# Patient Record
Sex: FEMALE | Race: BLACK OR AFRICAN AMERICAN | NOT HISPANIC OR LATINO | Employment: OTHER | URBAN - METROPOLITAN AREA
[De-identification: names, ages, dates, MRNs, and addresses within clinical notes are randomized per-mention and may not be internally consistent; named-entity substitution may affect disease eponyms.]

---

## 2017-10-09 ENCOUNTER — HOSPITAL ENCOUNTER (OUTPATIENT)
Dept: RADIOLOGY | Facility: HOSPITAL | Age: 72
Discharge: HOME | End: 2017-10-09
Attending: OTOLARYNGOLOGY
Payer: MEDICARE

## 2017-10-09 ENCOUNTER — HOSPITAL ENCOUNTER (OUTPATIENT)
Dept: PREADMISSION TESTING | Facility: HOSPITAL | Age: 72
Discharge: HOME | End: 2017-10-09
Attending: OTOLARYNGOLOGY
Payer: MEDICARE

## 2017-10-09 VITALS
HEART RATE: 52 BPM | WEIGHT: 151.13 LBS | HEIGHT: 65 IN | DIASTOLIC BLOOD PRESSURE: 82 MMHG | RESPIRATION RATE: 18 BRPM | BODY MASS INDEX: 25.18 KG/M2 | TEMPERATURE: 98 F | OXYGEN SATURATION: 98 % | SYSTOLIC BLOOD PRESSURE: 161 MMHG

## 2017-10-09 DIAGNOSIS — Z01.818 PREOPERATIVE TESTING: Primary | ICD-10-CM

## 2017-10-09 PROCEDURE — 71020 XR CHEST PA AND LATERAL PRE-OP: CPT | Mod: 26,,, | Performed by: RADIOLOGY

## 2017-10-09 PROCEDURE — 71020 XR CHEST PA AND LATERAL PRE-OP: CPT | Mod: TC

## 2017-10-09 RX ORDER — BISOPROLOL FUMARATE AND HYDROCHLOROTHIAZIDE 5; 6.25 MG/1; MG/1
1 TABLET ORAL DAILY
COMMUNITY
End: 2019-07-26

## 2017-10-09 NOTE — PLAN OF CARE
Pre-operative instructions, medication directives and pain scales reviewed with Ms Silver.  All questions the patient had  were answered. Re-assurance about surgical procedure and day of surgery routine given as needed. Ms Silver verbalized understanding of the pre-op instructions.

## 2017-10-10 ENCOUNTER — ANESTHESIA EVENT (OUTPATIENT)
Dept: SURGERY | Facility: HOSPITAL | Age: 72
End: 2017-10-10
Payer: MEDICARE

## 2017-10-11 ENCOUNTER — ANESTHESIA (OUTPATIENT)
Dept: SURGERY | Facility: HOSPITAL | Age: 72
End: 2017-10-11
Payer: MEDICARE

## 2017-10-11 ENCOUNTER — HOSPITAL ENCOUNTER (OUTPATIENT)
Facility: HOSPITAL | Age: 72
Discharge: HOME | End: 2017-10-11
Attending: OTOLARYNGOLOGY | Admitting: OTOLARYNGOLOGY
Payer: MEDICARE

## 2017-10-11 VITALS
HEIGHT: 65 IN | RESPIRATION RATE: 18 BRPM | HEART RATE: 47 BPM | WEIGHT: 151.13 LBS | DIASTOLIC BLOOD PRESSURE: 76 MMHG | BODY MASS INDEX: 25.18 KG/M2 | SYSTOLIC BLOOD PRESSURE: 151 MMHG | TEMPERATURE: 98 F | OXYGEN SATURATION: 99 %

## 2017-10-11 DIAGNOSIS — R49.0 HOARSENESS: Primary | ICD-10-CM

## 2017-10-11 PROCEDURE — 63600175 PHARM REV CODE 636 W HCPCS: Performed by: NURSE ANESTHETIST, CERTIFIED REGISTERED

## 2017-10-11 PROCEDURE — 25000003 PHARM REV CODE 250: Performed by: OTOLARYNGOLOGY

## 2017-10-11 PROCEDURE — 25000003 PHARM REV CODE 250: Performed by: NURSE ANESTHETIST, CERTIFIED REGISTERED

## 2017-10-11 PROCEDURE — 71000033 HC RECOVERY, INTIAL HOUR: Performed by: OTOLARYNGOLOGY

## 2017-10-11 PROCEDURE — 71000016 HC POSTOP RECOV ADDL HR: Performed by: OTOLARYNGOLOGY

## 2017-10-11 PROCEDURE — D9220A PRA ANESTHESIA: Mod: ANES,,, | Performed by: ANESTHESIOLOGY

## 2017-10-11 PROCEDURE — 36000708 HC OR TIME LEV III 1ST 15 MIN: Performed by: OTOLARYNGOLOGY

## 2017-10-11 PROCEDURE — 88342 IMHCHEM/IMCYTCHM 1ST ANTB: CPT | Mod: 26,,, | Performed by: PATHOLOGY

## 2017-10-11 PROCEDURE — D9220A PRA ANESTHESIA: Mod: CRNA,,, | Performed by: NURSE ANESTHETIST, CERTIFIED REGISTERED

## 2017-10-11 PROCEDURE — 25000003 PHARM REV CODE 250: Performed by: ANESTHESIOLOGY

## 2017-10-11 PROCEDURE — 88305 TISSUE EXAM BY PATHOLOGIST: CPT | Mod: 26,,, | Performed by: PATHOLOGY

## 2017-10-11 PROCEDURE — 37000009 HC ANESTHESIA EA ADD 15 MINS: Performed by: OTOLARYNGOLOGY

## 2017-10-11 PROCEDURE — 63600175 PHARM REV CODE 636 W HCPCS: Performed by: ANESTHESIOLOGY

## 2017-10-11 PROCEDURE — 71000015 HC POSTOP RECOV 1ST HR: Performed by: OTOLARYNGOLOGY

## 2017-10-11 PROCEDURE — 36000709 HC OR TIME LEV III EA ADD 15 MIN: Performed by: OTOLARYNGOLOGY

## 2017-10-11 PROCEDURE — 37000008 HC ANESTHESIA 1ST 15 MINUTES: Performed by: OTOLARYNGOLOGY

## 2017-10-11 PROCEDURE — 88305 TISSUE EXAM BY PATHOLOGIST: CPT | Performed by: PATHOLOGY

## 2017-10-11 RX ORDER — SODIUM CHLORIDE, SODIUM LACTATE, POTASSIUM CHLORIDE, CALCIUM CHLORIDE 600; 310; 30; 20 MG/100ML; MG/100ML; MG/100ML; MG/100ML
INJECTION, SOLUTION INTRAVENOUS CONTINUOUS
Status: DISCONTINUED | OUTPATIENT
Start: 2017-10-11 | End: 2017-10-11 | Stop reason: HOSPADM

## 2017-10-11 RX ORDER — HYDROCODONE BITARTRATE AND ACETAMINOPHEN 5; 325 MG/1; MG/1
1 TABLET ORAL EVERY 4 HOURS PRN
Status: DISCONTINUED | OUTPATIENT
Start: 2017-10-11 | End: 2017-10-11 | Stop reason: HOSPADM

## 2017-10-11 RX ORDER — HYDROMORPHONE HYDROCHLORIDE 2 MG/ML
0.2 INJECTION, SOLUTION INTRAMUSCULAR; INTRAVENOUS; SUBCUTANEOUS EVERY 5 MIN PRN
Status: DISCONTINUED | OUTPATIENT
Start: 2017-10-11 | End: 2017-10-11 | Stop reason: HOSPADM

## 2017-10-11 RX ORDER — ACETAMINOPHEN 10 MG/ML
1000 INJECTION, SOLUTION INTRAVENOUS ONCE
Status: DISCONTINUED | OUTPATIENT
Start: 2017-10-11 | End: 2017-10-11 | Stop reason: HOSPADM

## 2017-10-11 RX ORDER — LIDOCAINE HYDROCHLORIDE 10 MG/ML
1 INJECTION, SOLUTION EPIDURAL; INFILTRATION; INTRACAUDAL; PERINEURAL ONCE
Status: DISCONTINUED | OUTPATIENT
Start: 2017-10-11 | End: 2017-10-11 | Stop reason: HOSPADM

## 2017-10-11 RX ORDER — MEPERIDINE HYDROCHLORIDE 50 MG/ML
12.5 INJECTION INTRAMUSCULAR; INTRAVENOUS; SUBCUTANEOUS ONCE AS NEEDED
Status: DISCONTINUED | OUTPATIENT
Start: 2017-10-11 | End: 2017-10-11 | Stop reason: HOSPADM

## 2017-10-11 RX ORDER — PROPOFOL 10 MG/ML
VIAL (ML) INTRAVENOUS
Status: DISCONTINUED | OUTPATIENT
Start: 2017-10-11 | End: 2017-10-11

## 2017-10-11 RX ORDER — GLYCOPYRROLATE 0.2 MG/ML
INJECTION INTRAMUSCULAR; INTRAVENOUS
Status: DISCONTINUED | OUTPATIENT
Start: 2017-10-11 | End: 2017-10-11

## 2017-10-11 RX ORDER — LIDOCAINE HCL/PF 100 MG/5ML
SYRINGE (ML) INTRAVENOUS
Status: DISCONTINUED | OUTPATIENT
Start: 2017-10-11 | End: 2017-10-11

## 2017-10-11 RX ORDER — ROCURONIUM BROMIDE 10 MG/ML
INJECTION, SOLUTION INTRAVENOUS
Status: DISCONTINUED | OUTPATIENT
Start: 2017-10-11 | End: 2017-10-11

## 2017-10-11 RX ORDER — ONDANSETRON 2 MG/ML
INJECTION INTRAMUSCULAR; INTRAVENOUS
Status: DISCONTINUED | OUTPATIENT
Start: 2017-10-11 | End: 2017-10-11

## 2017-10-11 RX ORDER — ACETAMINOPHEN 10 MG/ML
INJECTION, SOLUTION INTRAVENOUS
Status: DISCONTINUED | OUTPATIENT
Start: 2017-10-11 | End: 2017-10-11

## 2017-10-11 RX ORDER — ONDANSETRON 8 MG/1
8 TABLET, ORALLY DISINTEGRATING ORAL ONCE
Status: DISCONTINUED | OUTPATIENT
Start: 2017-10-11 | End: 2017-10-11 | Stop reason: HOSPADM

## 2017-10-11 RX ORDER — DIPHENHYDRAMINE HYDROCHLORIDE 50 MG/ML
25 INJECTION INTRAMUSCULAR; INTRAVENOUS EVERY 6 HOURS PRN
Status: DISCONTINUED | OUTPATIENT
Start: 2017-10-11 | End: 2017-10-11 | Stop reason: HOSPADM

## 2017-10-11 RX ORDER — SODIUM CHLORIDE 0.9 % (FLUSH) 0.9 %
3 SYRINGE (ML) INJECTION
Status: DISCONTINUED | OUTPATIENT
Start: 2017-10-11 | End: 2017-10-11 | Stop reason: HOSPADM

## 2017-10-11 RX ORDER — EPINEPHRINE NASAL SOLUTION 1 MG/ML
SOLUTION NASAL
Status: DISCONTINUED | OUTPATIENT
Start: 2017-10-11 | End: 2017-10-11 | Stop reason: HOSPADM

## 2017-10-11 RX ORDER — NEOSTIGMINE METHYLSULFATE 1 MG/ML
INJECTION, SOLUTION INTRAVENOUS
Status: DISCONTINUED | OUTPATIENT
Start: 2017-10-11 | End: 2017-10-11

## 2017-10-11 RX ORDER — FENTANYL CITRATE 50 UG/ML
INJECTION, SOLUTION INTRAMUSCULAR; INTRAVENOUS
Status: DISCONTINUED | OUTPATIENT
Start: 2017-10-11 | End: 2017-10-11

## 2017-10-11 RX ORDER — MIDAZOLAM HYDROCHLORIDE 1 MG/ML
INJECTION, SOLUTION INTRAMUSCULAR; INTRAVENOUS
Status: DISCONTINUED | OUTPATIENT
Start: 2017-10-11 | End: 2017-10-11

## 2017-10-11 RX ORDER — METOCLOPRAMIDE HYDROCHLORIDE 5 MG/ML
10 INJECTION INTRAMUSCULAR; INTRAVENOUS EVERY 10 MIN PRN
Status: DISCONTINUED | OUTPATIENT
Start: 2017-10-11 | End: 2017-10-11 | Stop reason: HOSPADM

## 2017-10-11 RX ADMIN — ROCURONIUM BROMIDE 25 MG: 10 INJECTION, SOLUTION INTRAVENOUS at 07:10

## 2017-10-11 RX ADMIN — LIDOCAINE HYDROCHLORIDE 100 MG: 20 INJECTION, SOLUTION INTRAVENOUS at 07:10

## 2017-10-11 RX ADMIN — MIDAZOLAM HYDROCHLORIDE 2 MG: 1 INJECTION, SOLUTION INTRAMUSCULAR; INTRAVENOUS at 07:10

## 2017-10-11 RX ADMIN — NEOSTIGMINE METHYLSULFATE 3 MG: 1 INJECTION INTRAVENOUS at 08:10

## 2017-10-11 RX ADMIN — PROPOFOL 150 MG: 10 INJECTION, EMULSION INTRAVENOUS at 07:10

## 2017-10-11 RX ADMIN — ACETAMINOPHEN 1000 MG: 10 INJECTION, SOLUTION INTRAVENOUS at 07:10

## 2017-10-11 RX ADMIN — GLYCOPYRROLATE 0.4 MG: 0.2 INJECTION, SOLUTION INTRAMUSCULAR; INTRAVENOUS at 08:10

## 2017-10-11 RX ADMIN — HYDROMORPHONE HYDROCHLORIDE 0.2 MG: 2 INJECTION, SOLUTION INTRAMUSCULAR; INTRAVENOUS; SUBCUTANEOUS at 08:10

## 2017-10-11 RX ADMIN — ONDANSETRON 4 MG: 2 INJECTION, SOLUTION INTRAMUSCULAR; INTRAVENOUS at 08:10

## 2017-10-11 RX ADMIN — FENTANYL CITRATE 100 MCG: 50 INJECTION INTRAMUSCULAR; INTRAVENOUS at 07:10

## 2017-10-11 RX ADMIN — SODIUM CHLORIDE, SODIUM LACTATE, POTASSIUM CHLORIDE, AND CALCIUM CHLORIDE: .6; .31; .03; .02 INJECTION, SOLUTION INTRAVENOUS at 06:10

## 2017-10-11 NOTE — ANESTHESIA PREPROCEDURE EVALUATION
10/11/2017  Rachel Silver is a 71 y.o., female.    Anesthesia Evaluation         Review of Systems  Cardiovascular:   Hypertension       Physical Exam  General:  Well nourished    Airway/Jaw/Neck:  Airway Findings: Mouth Opening: Normal Tongue: Normal  Mallampati: II      Dental:  Dental Findings: In tact   Chest/Lungs:  Chest/Lungs Clear    Heart/Vascular:  Heart Findings: Normal Heart murmur: negative       Mental Status:  Mental Status Findings:  Cooperative, Alert and Oriented         Anesthesia Plan  Type of Anesthesia, risks & benefits discussed:  Anesthesia Type:  general, MAC, regional  Patient's Preference:   Intra-op Monitoring Plan: standard ASA monitors  Intra-op Monitoring Plan Comments:   Post Op Pain Control Plan: multimodal analgesia  Post Op Pain Control Plan Comments:   Induction:   IV  Beta Blocker:  Patient is not currently on a Beta-Blocker (No further documentation required).       Informed Consent: Patient understands risks and agrees with Anesthesia plan.  Questions answered. Anesthesia consent signed with patient.  ASA Score: 2     Day of Surgery Review of History & Physical:            Ready For Surgery From Anesthesia Perspective.

## 2017-10-11 NOTE — BRIEF OP NOTE
Operative Note     SUMMARY     Surgery Date: 10/11/2017     Surgeon(s) and Role:     * Gumaro Gaines MD - Primary    Pre-op Diagnosis:  Hoarseness [R49.0]  Vocal cord nodules [J38.2]    Post-op Diagnosis:  Hoarseness [R49.0]  Vocal cord nodules [J38.2]    Procedure(s):  MICROLARYNGOSCOPY W/ BIOPSY-LEFT TRUE VOCAL CORD    Anesthesia: General    Findings/Key Components:  Hemorrhagic nodule left anterior true vocal cord    Estimated Blood Loss: 1 cc         Specimens     Start     Ordered    10/11/17 0806  Specimen to Pathology - Surgery  Once      10/11/17 0805            Discharge Note      SUMMARY     Admit Date: 10/11/2017    Attending Physician: Gumaro Gaines MD     Discharge Physician: Gumaro Gaines MD    Discharge Date: 10/11/2017     Final Diagnosis: Hoarseness [R49.0]  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      CODEINE/BUTALBITAL/ASA/CAFFEIN (FIORINAL-CODEINE #3 ORAL) Take 1 capsule by mouth every 6 to 8 hours as needed.             Discharge Procedure Orders (must include Diet, Follow-up, Activity)    Discharge Procedure Orders (must include Diet, Follow-up, Activity)  Activity as tolerated     Keep surgical extremity elevated     Call MD for:  temperature >100.4     Call MD for:  persistent nausea and vomiting     Call MD for:  severe uncontrolled pain     Call MD for:  difficulty breathing, headache or visual disturbances     Call MD for:   Order Comments: bleeding

## 2017-10-11 NOTE — TRANSFER OF CARE
"Anesthesia Transfer of Care Note    Patient: Rachel Silver    Procedure(s) Performed: Procedure(s):  MICROLARYNGOSCOPY W/ BIOPSY-LEFT TRUE VOCAL CORD    Patient location: PACU    Anesthesia Type: general    Transport from OR: Transported from OR on room air with adequate spontaneous ventilation    Post pain: adequate analgesia    Post assessment: no apparent anesthetic complications and tolerated procedure well    Post vital signs: stable    Level of consciousness: sedated and responds to stimulation    Nausea/Vomiting: no nausea/vomiting    Complications: none    Transfer of care protocol was followed      Last vitals:   Visit Vitals  /65 (BP Location: Left arm, Patient Position: Lying)   Pulse 61   Temp 36.3 °C (97.3 °F) (Oral)   Resp 12   Ht 5' 5" (1.651 m)   Wt 68.5 kg (151 lb 2 oz)   SpO2 100%   Breastfeeding? No   BMI 25.15 kg/m²     "

## 2017-10-11 NOTE — OP NOTE
DATE OF PROCEDURE:  10/11/2017    PREOPERATIVE DIAGNOSES:  1.  Hoarseness.  2.  Left-sided vocal nodule.    POSTOPERATIVE DIAGNOSES:  1.  Hoarseness.  2.  Left-sided vocal nodule.    PROCEDURE PERFORMED:  Laryngoscopy, removal of nodule on left true vocal cord.    SURGEON:  Gumaro Gaines M.D.    PROCEDURE IN DETAIL:  After being given adequate medication, the patient moved   to the Operating Room, placed supine on the operating table.  After induction of   intravenous fluids and general endotracheal anesthesia, the anterior commissure   laryngoscope was brought into the patient's oral cavity.  The base of tongue,   vallecula, epiglottis on both surfaces, lateral pharyngeal walls, posterior   pharyngeal walls were all examined and found to be within normal limits.    Pertinent findings were confined to the endolarynx.  Examination of left true   vocal cord revealed a hemorrhagic nodule on the anterior one-third of vocal   cord.  The remainder of the endolarynx was normal.  Elevating this, the laser   tube with the scope revealed the postcricoid area, arytenoids were normal.  At   this point, attention was turned back to the nodule.  The anterior commissure   laryngoscope was suspended with the Lewy suspension apparatus.  The nodule at   the anterior third of the vocal cord on the left side was removed with upbiting   and forward biting forceps.  Adrenaline cottonoids were placed.  These were left   in place for a few minutes, subsequently removed.  All the cottonoids were   accounted for.  The area beneath the cords as well as the area of the endolarynx   and the glottis were all suctioned dry.  There was no bleeding present.  No   residual blood was left.  All cottonoids were accounted for.  Carefully examined   the patient's temporary dental work, revealed it to be completely intact.      OSMAN/IN  dd: 10/11/2017 08:25:52 (CDT)  td: 10/11/2017 08:50:59 (CDT)  Doc ID   #[id number]  Job ID #[id number]    CC:

## 2017-10-11 NOTE — ANESTHESIA POSTPROCEDURE EVALUATION
"Anesthesia Post Evaluation    Patient: Rachel Silver    Procedure(s) Performed: Procedure(s):  MICROLARYNGOSCOPY W/ BIOPSY-LEFT TRUE VOCAL CORD    Final Anesthesia Type: general  Patient location during evaluation: PACU  Patient participation: Yes- Able to Participate  Level of consciousness: awake and alert, oriented and awake  Post-procedure vital signs: reviewed and stable  Pain management: adequate  Airway patency: patent  PONV status at discharge: No PONV  Anesthetic complications: no      Cardiovascular status: blood pressure returned to baseline  Respiratory status: unassisted and spontaneous ventilation  Hydration status: euvolemic  Follow-up not needed.        Visit Vitals  BP (!) 148/67   Pulse (!) 44   Temp 36.7 °C (98 °F) (Oral)   Resp 11   Ht 5' 5" (1.651 m)   Wt 68.5 kg (151 lb 2 oz)   SpO2 99%   Breastfeeding? No   BMI 25.15 kg/m²       Pain/Larry Score: Pain Assessment Performed: Yes (10/11/2017  9:02 AM)  Presence of Pain: complains of pain/discomfort (10/11/2017  9:02 AM)  Pain Rating Prior to Med Admin: 7 (10/11/2017  8:54 AM)  Larry Score: 10 (10/11/2017  8:51 AM)      "

## 2017-10-11 NOTE — H&P
Patients H & P composed on 10/5/2017 was reviewed, no changes noted.    The patient has been examined and the H&P has been reviewed:  I concur with the findings and no changes have occurred since H&P was written.      Anesthesia/Surgery risks, benefits and alternative options discussed and understood by patient/family.

## 2017-10-11 NOTE — DISCHARGE INSTRUCTIONS
Advance diet as tolerated to Regular diet     Diet  Make sure you get enough fluids and nutrients. Food and drink guidelines include:  Have lots of fluids. Good choices are water, popsicles, and mild juices. (Do NOT  have citrus juice or other acidic juices.)  Have soft foods to eat. These include gelatin, pudding, ice cream, scrambled eggs, pasta, and mashed foods.  Do not have spicy, acidic, or rough foods. These include fresh fruits, toast, crackers, and potato chips.  DIET: You may resume your home diet. If nausea is present, increase your diet gradually with fluids and bland foods    ACTIVITY LEVEL: You have received sedation or an anesthetic, you may feel sleepy for several hours. Rest until you are more awake. Gradually resume your normal activities    Medications: Pain medication should be taken only if needed and as directed. If antibiotics are prescribed, the medication should be taken until completed. You will be given an updated list of you medications.    No driving, alcoholic beverages or signing legal documents for next 24 hours or while taking pain medication.       CALL THE DOCTOR:    For any obvious bleeding (some dried blood over the incision is normal).      Redness, swelling, foul smell around incision or fever over 101.   Shortness of breath, Coughing up Bloody sputum, Pains or Swelling in your Calves .   Persistent pain or nausea not relieved by medication.    If any unusual problems or difficulties occur contact your doctor. If you cannot contact your doctor but feel your signs and symptoms warrant a physicians attention return to the emergency room.  Fall Prevention  Millions of people fall every year and injure themselves. You may have had anesthesia or sedation which may increase your risk of falling. You may have health issues that put you at an increased risk of falling.     Here are ways to reduce your risk of falling.  ·   · Make your home safe by keeping walkways clear of objects  you may trip over.  · Use non-slip pads under rugs. Do not use area rugs or small throw rugs.  · Use non-slip mats in bathtubs and showers.  · Install handrails and lights on staircases.  · Do not walk in poorly lit areas.  · Do not stand on chairs or wobbly ladders.  · Use caution when reaching overhead or looking upward. This position can cause a loss of balance.  · Be sure your shoes fit properly, have non-slip bottoms and are in good condition.   · Wear shoes both inside and out. Avoid going barefoot or wearing slippers.  · Be cautious when going up and down stairs, curbs, and when walking on uneven sidewalks.  · If your balance is poor, consider using a cane or walker.  · If your fall was related to alcohol use, stop or limit alcohol intake.   · If your fall was related to use of sleeping medicines, talk to your doctor about this. You may need to reduce your dosage at bedtime if you awaken during the night to go to the bathroom.    · To reduce the need for nighttime bathroom trips:  ¨ Avoid drinking fluids for several hours before going to bed  ¨ Empty your bladder before going to bed  ¨ Men can keep a urinal at the bedside  · Stay as active as you can. Balance, flexibility, strength, and endurance all come from exercise. They all play a role in preventing falls. Ask your healthcare provider which types of activity are right for you.  · Get your vision checked on a regular basis.  · If you have pets, know where they are before you stand up or walk so you don't trip over them.  · Use night lights.

## 2017-10-11 NOTE — DISCHARGE SUMMARY
DATE OF ADMISSION:  10/11/2017    DATE OF DISCHARGE:  10/11/2017    ADMITTING PHYSICIAN:  Dr. Gumaro Gaines.    HOSPITAL COURSE:  The patient's history and physical examination and laboratory   profile was within normal limits.  The lady was taken over to the Operating Room   on 10/11/2017 where under general anesthetic, she underwent the above-named   procedure.  Intraoperatively, she did fine.  Postop, she was discharged in care   of her family.  She was given prescriptions for antibiotics as well as   analgesics and instructed in detail on how to use those.  I advised her to call   me should there be any questions or problems.  I advised her to avoid aspirin,   aspirin-containing products, as well as nonsteroidal anti-inflammatories.  I   advised that she should use 7 days of strict voice rest, writing notes and not   speaking.  Advised her to call me should she have any questions or problems.      SDG/IN  dd: 10/11/2017 08:25:52 (CDT)  td: 10/11/2017 08:56:37 (CDT)  Doc ID   #[id number]  Job ID #[id number]    CC: Tu Marquis M.D.

## 2018-12-12 ENCOUNTER — OFFICE VISIT (OUTPATIENT)
Dept: OTOLARYNGOLOGY | Facility: CLINIC | Age: 73
End: 2018-12-12
Payer: MEDICARE

## 2018-12-12 VITALS
DIASTOLIC BLOOD PRESSURE: 80 MMHG | SYSTOLIC BLOOD PRESSURE: 140 MMHG | HEIGHT: 65 IN | WEIGHT: 158 LBS | BODY MASS INDEX: 26.33 KG/M2

## 2018-12-12 DIAGNOSIS — J30.1 SEASONAL ALLERGIC RHINITIS DUE TO POLLEN: Primary | ICD-10-CM

## 2018-12-12 PROCEDURE — 1101F PT FALLS ASSESS-DOCD LE1/YR: CPT | Mod: CPTII,S$GLB,, | Performed by: OTOLARYNGOLOGY

## 2018-12-12 PROCEDURE — 99213 OFFICE O/P EST LOW 20 MIN: CPT | Mod: 25,S$GLB,, | Performed by: OTOLARYNGOLOGY

## 2018-12-12 PROCEDURE — 31575 DIAGNOSTIC LARYNGOSCOPY: CPT | Mod: S$GLB,,, | Performed by: OTOLARYNGOLOGY

## 2018-12-12 RX ORDER — FEXOFENADINE HCL 60 MG
60 TABLET ORAL 2 TIMES DAILY
Qty: 30 TABLET | Refills: 11 | Status: SHIPPED | OUTPATIENT
Start: 2018-12-12 | End: 2021-03-22

## 2018-12-12 NOTE — PATIENT INSTRUCTIONS
Take the Allegra twice a day as you needed for allergy.  It is refillable.    Contact the office if you become hoarse or developed a sinus infection.

## 2018-12-12 NOTE — PROGRESS NOTES
"History of Present Illness:  Rachel Silver presents to the clinic today for Other (Follow up Fiberoptic from Microlaryngoscopy 10/2017)  .  She is a 73-year-old female who has been a patient here since 2013.  She has a history of sensorineural hearing loss, allergic rhinitis, and occasionally sinusitis.  She has been treated with Flonase and Allegra.  She has constant clearing of her throat with allergy symptoms only.  She has no purulent postnasal drainage.  She underwent a microlaryngoscopy with biopsy removal of a nodule from her left true vocal cord on 10/11/2017.  She has not been hoarse since and she is not hoarse today.  She is here for follow-up surveillance vocal cord examination.  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  She has had no further problems.  She is here today for fiberoptic examination of her vocal cords.  Her only current problem is her allergic rhinitis.    Past Medical History:   Diagnosis Date    Cataracts, bilateral     Hoarse     Hypertension      Past Surgical History:   Procedure Laterality Date    HYSTERECTOMY      MICROLARYNGOSCOPY W/ BIOPSY-LEFT TRUE VOCAL CORD  10/11/2017    Performed [facility] at [facility]    Microlaryngoscopy with biopsy vocal cords      TONSILLECTOMY      torn meniscus       History reviewed. No pertinent family history.    Social History     Tobacco Use    Smoking status: Former Smoker     Packs/day: 1.50     Last attempt to quit: 10/9/1987     Years since quittin.1    Smokeless tobacco: Never Used   Substance Use Topics    Alcohol use: Yes     Comment: social    Drug use: No         REVIEW OF SYSTEMS:    General - the patient denies fevers, chills, night sweats, and weight loss   Ears     - the patient has a mild chronic hearing loss.               - denies infection, pressure, drainage, congestion                - denies dizziness  Nose    - denies " previous nasal surgery but she has a long history of nasal allergy.      - denies previous sinus surgery      - denies nasal obstruction      - denies post nasal drip      - denies snoring      - denies loss of smell  Throat    - denies throat pain      - denies hoarseness since her vocal nodule removal on 10/11/2017.  Neck    - denies neck mass  Eyes       - the patient denies blurry vision, double vision, glaucoma and pain she has had previous cataract surgery.  Cardiovascular - the patient denies chest pain and palpitations       - she has a history of hypertension  Endocrine - the patient denies heat/cold intolerance, excessive thirst      - denies diabetes      - denies thyroid problems    Gastrointestinal - the patient denies abdominal pain, nausea, vomiting, change in bowel         movements      - denies GERD      - denies problems with swallowing      - denies pain on swallowing  Genitourinary - the patient denies dysuria and hematuria      - denies CKD      - denies prostate problems (males only)  Heme/Lymph - the patient denies easy bruising, bleeding disorder      - denies anemia       -denies using blood thinner medication other than ASA      - denies swollen glands in the axilla or groin  Musculoskeletal - the patient denies muscle weakness      - denies arthritis   Neuro     - the patient denies unexplained weakness or slurred speech      - denies focal neurologic symptoms       - denies seizures  Psych    - the patient denies anxiety and depression   Respiratory - the patient denies cough and shortness of breath   - denies asthma   - denies COPD   - denies sleep apnea symptoms   - is not on CPAP   Skin - the patient denies new skin lesions, changes in moles, and rashes       Physical Exam:    Gen    - she is  well-nourished well-developed, and in no acute distress.   Voice - clear, speech intelligible   Head  - normocephalic without evidence of trauma, face symmetric with good tone   Salivary  glands             - Parotid glands : no asymmetry, no lesions or masses    - Submaxillary (submandibular) glands: no asymmetry, no lesions or masses  Skin   - no rashes or lesions of the skin of the head and neck   Ears   - both tympanic membranes, external canals, and auricles are normal;                Hearing is grossly intact.   Nose  - there is no external deformity. There is no rhinorrhea. Septum is midline.               The inferior turbinates are unremarkable. The mucosa is healthy but allergic.             No polyps were noted.  Oral cavity    - there are no lesions of the lips, nor of the buccal, lingual, gingival, or               palatal mucosal surfaces. The dentition is adequate.   Oropharynx   . The posterior oropharyngeal wall is clear.               The base of tongue has no lesions.    Neck  - palpation of the neck reveals no lymphadenopathy or masses.               The thyroid is unremarkable. There are no incisions.               No supraclavicular lymphadenopathy.   Lungs -Chest rise is symmetric without use of accessory muscles.                There is no stridor or congestion.  CV       - regular rate and rhythm.    Abdomen       - Nondistended.  Extremities    - warm and well-perfused with no cyanosis clubbing or edema   Neuro  - CN II-XII are intact and symmetric. Gait is normal. The patient is AAO x 3,                with a calm affect.    FIBEROPTIC LARYNGOSCOPY:    After appropriate consent from the patient, a fiberoptic laryngoscope was introduced into the nose and the nose and nasopharynx were examined.  The nose was clear of infection and the nasopharynx was also clear.  No lesions were noted in the nasopharynx.  No swelling was noted.    The base of tongue was clear.  The vallecula was clear.  Piriform sinuses were clear.  The posterior pharyngeal wall was clear.    The laryngeal and lingual surfaces of the epiglottis were clear.  The aryepiglottic folds, arytenoids, and inner  arytenoid areas were all clear.    The false vocal cords were clear, the ventricle was clear, and the true vocal cords were clear and without lesion.  There is some mild erythema at the operative site on the left vocal cord which appears to be circulatory and does not represent a lesion of the mucosa. No nodules or lesions are present    Vocal cord mobility was normal. Her voice was normal with no hoarseness.    What could be seen of the subglottic area and upper trachea was also clear.    Impression:  Normal examination of the larynx, pharynx, and nasopharynx.     Assessment:   Rachel was seen today for other.    Diagnoses and all orders for this visit:    Seasonal allergic rhinitis due to pollen  -     fexofenadine (ALLEGRA) 60 MG tablet; Take 1 tablet (60 mg total) by mouth 2 (two) times daily. Take 1 tablet twice a day as needed for allergy          Plan:   her vocal cord examination is normal and she has no hoarseness.  This does not require further follow-up.  I gave her prescription for Allegra for her allergic rhinitis.  She has a prescription for Flonase but does not like using nasal sprays.    Follow-up if symptoms worsen or fail to improve.

## 2019-06-24 ENCOUNTER — OFFICE VISIT (OUTPATIENT)
Dept: OTOLARYNGOLOGY | Facility: CLINIC | Age: 74
End: 2019-06-24
Payer: MEDICARE

## 2019-06-24 VITALS
HEIGHT: 65 IN | WEIGHT: 162.5 LBS | SYSTOLIC BLOOD PRESSURE: 152 MMHG | DIASTOLIC BLOOD PRESSURE: 70 MMHG | BODY MASS INDEX: 27.07 KG/M2

## 2019-06-24 DIAGNOSIS — R49.0 HOARSENESS: Primary | ICD-10-CM

## 2019-06-24 PROCEDURE — 31575 DIAGNOSTIC LARYNGOSCOPY: CPT | Mod: S$GLB,,, | Performed by: OTOLARYNGOLOGY

## 2019-06-24 PROCEDURE — 1101F PR PT FALLS ASSESS DOC 0-1 FALLS W/OUT INJ PAST YR: ICD-10-PCS | Mod: CPTII,S$GLB,, | Performed by: OTOLARYNGOLOGY

## 2019-06-24 PROCEDURE — 31575 PR LARYNGOSCOPY, FLEXIBLE; DIAGNOSTIC: ICD-10-PCS | Mod: S$GLB,,, | Performed by: OTOLARYNGOLOGY

## 2019-06-24 PROCEDURE — 99214 PR OFFICE/OUTPT VISIT, EST, LEVL IV, 30-39 MIN: ICD-10-PCS | Mod: 25,S$GLB,, | Performed by: OTOLARYNGOLOGY

## 2019-06-24 PROCEDURE — 1101F PT FALLS ASSESS-DOCD LE1/YR: CPT | Mod: CPTII,S$GLB,, | Performed by: OTOLARYNGOLOGY

## 2019-06-24 PROCEDURE — 99214 OFFICE O/P EST MOD 30 MIN: CPT | Mod: 25,S$GLB,, | Performed by: OTOLARYNGOLOGY

## 2019-07-02 NOTE — PROGRESS NOTES
Chief Complaint   Patient presents with    Other     Hoarseness follow up from Dr. Mittal         73 y.o. female presents with follow up for vocal cord check. Previous patient of Dr. Gaines's. Underwent microlaryngoscopy and removal of a VC nodule in 2017. No current symptoms. Feels that voice is good.      Review of Systems     Constitutional: Negative for fatigue and unexpected weight change.   HENT: per HPI.  Eyes: Negative for visual disturbance.   Respiratory: Negative for shortness of breath, hemoptysis  Cardiovascular: Negative for chest pain and palpitations.   Genitourinary: Negative for dysuria and difficulty urinating.   Musculoskeletal: Negative for decreased ROM, back pain.   Skin: Negative for rash, sunburn, itching.   Neurological: Negative for dizziness and seizures.   Hematological: Negative for adenopathy. Does not bruise/bleed easily.   Psychiatric/Behavioral: Negative for agitation. The patient is not nervous/anxious.   Endocrine: Negative for rapid weight loss/weight gain, heat/cold intolerance.     Past Medical History:   Diagnosis Date    Cataracts, bilateral     Hoarse     Hypertension        Past Surgical History:   Procedure Laterality Date    HYSTERECTOMY      MICROLARYNGOSCOPY W/ BIOPSY-LEFT TRUE VOCAL CORD  10/11/2017    Performed by Gumaro Gaines MD at [facility] OR    Microlaryngoscopy with biopsy vocal cords      TONSILLECTOMY      torn meniscus         family history is not on file.    Pt  reports that she quit smoking about 31 years ago. She smoked 1.50 packs per day. She has never used smokeless tobacco. She reports that she drinks alcohol. She reports that she does not use drugs.    Review of patient's allergies indicates:  No Known Allergies     Physical Exam    Vitals:    06/24/19 1145   BP: (!) 152/70     Body mass index is 27.04 kg/m².    Physical Exam   Constitutional: She is oriented to person, place, and time. She appears well-developed and well-nourished. No  distress.   HENT:   Head: Normocephalic and atraumatic.   Right Ear: Hearing, tympanic membrane, external ear and ear canal normal. Tympanic membrane mobility is normal. No middle ear effusion. No decreased hearing is noted.   Left Ear: Hearing, tympanic membrane, external ear and ear canal normal. Tympanic membrane mobility is normal.  No middle ear effusion. No decreased hearing is noted.   Nose: Nose normal.   Mouth/Throat: Oropharynx is clear and moist.   Eyes: Pupils are equal, round, and reactive to light. Conjunctivae, EOM and lids are normal. Right eye exhibits no discharge. Left eye exhibits no discharge.   Neck: Trachea normal, normal range of motion and phonation normal. Neck supple. No tracheal tenderness present. No tracheal deviation, no edema and no erythema present. No thyroid mass and no thyromegaly present.   Cardiovascular: Normal heart sounds.   Pulmonary/Chest: Breath sounds normal. No stridor.   Abdominal: Soft.   Lymphadenopathy:     She has no cervical adenopathy.   Neurological: She is alert and oriented to person, place, and time.   Skin: Skin is warm and dry. No rash noted. She is not diaphoretic. No erythema. No pallor.   Psychiatric: She has a normal mood and affect.   Nursing note and vitals reviewed.    Procedure: Flexible laryngoscopy  In order to fully examine the upper aerodigestive tract, including the larynx, in a patient with a hyperactive gag reflex, flexible endoscopy is required.  After explaining the procedure and obtaining verbal consent, a timeout was performed with the patient's participation according to the universal protocol. Both nasal cavities were anesthetized with 4% Xylocaine spray mixed with Arsen-Synephrine. The flexible laryngoscope was inserted into the nasal cavity and advanced to visualize the nasal cavity, nasopharynx, the posterior oropharynx, hypopharynx, and the endolarynx with the above findings noted. The scope was removed and the procedure terminated.  The patient tolerated this procedure well without apparent complication.     Nasopharynx - the torus is clear. There are no lesions of the posterior wall.   Oropharynx - no lesions of the tongue base. There is no obvious fullness or asymmetry.  Hypopharynx - there are no lesions of the pyriform sinuses or postcricoid region    Larynx - there are no lesions of the supraglottic or glottic larynx. Vocal fold mobility is normal with complete closure.     Assessment     1. Hoarseness          Plan  In summary, Ms. Silver is a 73 year old female with previous history of a vocal cord nodule s/p excision, doing well. No abnormality on flexible laryngoscopy today. Reassurance given. No need to follow up for further check unless symptoms recur. All questions were answered.

## 2019-07-16 ENCOUNTER — TELEPHONE (OUTPATIENT)
Dept: CARDIOLOGY | Facility: CLINIC | Age: 74
End: 2019-07-16

## 2019-07-16 DIAGNOSIS — R00.2 PALPITATIONS: Primary | ICD-10-CM

## 2019-07-16 NOTE — TELEPHONE ENCOUNTER
----- Message from Theresa Maldonado sent at 7/16/2019 11:54 AM CDT -----  Regarding: New pt EKG order/rohini  Please put EKG order in the computer and rohini pt on 7/26/19 at 9:30 a.m    Thanks

## 2019-07-26 ENCOUNTER — HOSPITAL ENCOUNTER (OUTPATIENT)
Dept: CARDIOLOGY | Facility: CLINIC | Age: 74
Discharge: HOME | End: 2019-07-26
Payer: MEDICARE

## 2019-07-26 ENCOUNTER — OFFICE VISIT (OUTPATIENT)
Dept: CARDIOLOGY | Facility: CLINIC | Age: 74
End: 2019-07-26
Payer: MEDICARE

## 2019-07-26 VITALS
SYSTOLIC BLOOD PRESSURE: 162 MMHG | OXYGEN SATURATION: 97 % | DIASTOLIC BLOOD PRESSURE: 79 MMHG | HEIGHT: 65 IN | WEIGHT: 163.56 LBS | HEART RATE: 50 BPM | BODY MASS INDEX: 27.25 KG/M2

## 2019-07-26 DIAGNOSIS — R07.89 OTHER CHEST PAIN: ICD-10-CM

## 2019-07-26 DIAGNOSIS — R07.89 OTHER CHEST PAIN: Primary | ICD-10-CM

## 2019-07-26 DIAGNOSIS — R94.31 ST SEGMENT DEPRESSION: ICD-10-CM

## 2019-07-26 DIAGNOSIS — Z86.79 HISTORY OF ASCVD: Primary | ICD-10-CM

## 2019-07-26 DIAGNOSIS — Z86.79 HISTORY OF ASCVD: ICD-10-CM

## 2019-07-26 DIAGNOSIS — R94.31 T WAVE INVERSION IN EKG: ICD-10-CM

## 2019-07-26 DIAGNOSIS — R00.2 PALPITATIONS: ICD-10-CM

## 2019-07-26 DIAGNOSIS — I10 ESSENTIAL HYPERTENSION: ICD-10-CM

## 2019-07-26 PROCEDURE — 93005 EKG 12-LEAD: ICD-10-PCS | Mod: S$GLB,,, | Performed by: INTERNAL MEDICINE

## 2019-07-26 PROCEDURE — 99204 OFFICE O/P NEW MOD 45 MIN: CPT | Mod: S$GLB,,, | Performed by: INTERNAL MEDICINE

## 2019-07-26 PROCEDURE — 93005 ELECTROCARDIOGRAM TRACING: CPT | Mod: S$GLB,,, | Performed by: INTERNAL MEDICINE

## 2019-07-26 PROCEDURE — 99999 PR PBB SHADOW E&M-EST. PATIENT-LVL III: CPT | Mod: PBBFAC,,, | Performed by: INTERNAL MEDICINE

## 2019-07-26 PROCEDURE — 99999 PR PBB SHADOW E&M-EST. PATIENT-LVL III: ICD-10-PCS | Mod: PBBFAC,,, | Performed by: INTERNAL MEDICINE

## 2019-07-26 PROCEDURE — 99204 PR OFFICE/OUTPT VISIT, NEW, LEVL IV, 45-59 MIN: ICD-10-PCS | Mod: S$GLB,,, | Performed by: INTERNAL MEDICINE

## 2019-07-26 PROCEDURE — 1101F PT FALLS ASSESS-DOCD LE1/YR: CPT | Mod: CPTII,S$GLB,, | Performed by: INTERNAL MEDICINE

## 2019-07-26 PROCEDURE — 1101F PR PT FALLS ASSESS DOC 0-1 FALLS W/OUT INJ PAST YR: ICD-10-PCS | Mod: CPTII,S$GLB,, | Performed by: INTERNAL MEDICINE

## 2019-07-26 PROCEDURE — 93010 EKG 12-LEAD: ICD-10-PCS | Mod: S$GLB,,, | Performed by: INTERNAL MEDICINE

## 2019-07-26 PROCEDURE — 93010 ELECTROCARDIOGRAM REPORT: CPT | Mod: S$GLB,,, | Performed by: INTERNAL MEDICINE

## 2019-07-26 RX ORDER — LOSARTAN POTASSIUM 50 MG/1
50 TABLET ORAL DAILY
Qty: 90 TABLET | Refills: 3 | Status: SHIPPED | OUTPATIENT
Start: 2019-07-26 | End: 2019-07-26

## 2019-07-26 RX ORDER — HYDROCHLOROTHIAZIDE 25 MG/1
25 TABLET ORAL DAILY
Qty: 30 TABLET | Refills: 11 | Status: SHIPPED | OUTPATIENT
Start: 2019-07-26 | End: 2019-07-26 | Stop reason: SDUPTHER

## 2019-07-26 RX ORDER — ROSUVASTATIN CALCIUM 40 MG/1
40 TABLET, COATED ORAL NIGHTLY
Qty: 90 TABLET | Refills: 3 | Status: SHIPPED | OUTPATIENT
Start: 2019-07-26 | End: 2020-10-01

## 2019-07-26 RX ORDER — HYDROCHLOROTHIAZIDE 25 MG/1
25 TABLET ORAL DAILY
Qty: 90 TABLET | Refills: 3 | Status: SHIPPED | OUTPATIENT
Start: 2019-07-26 | End: 2020-05-14 | Stop reason: SDUPTHER

## 2019-07-26 NOTE — PROGRESS NOTES
"Subjective:   Patient ID:  Rachel Silver is a 73 y.o. female is a new patient who presents for evaluation of Hypertension      HPI:   Patient had chest pain going to the back radiating to the back. Patient was going up a hill and felt back pain and radiated to the chest. She had a regular treadmill stress test.   Ex smoker  Mother had CHF, uncle  of MI at the age of 48.  BP at home 152 or so.     ASCVD risk is 23%    EKG:  Sinus rhythm with T wave inversions and ST depression precordial leads.     T chol 250  HDL 60, TG 74    The ASCVD Risk score ([place] HERNÁNDEZ Jr., et al., 2013) failed to calculate for the following reasons:    Cannot find a previous HDL lab    Cannot find a previous total cholesterol lab    Patient Active Problem List   Diagnosis    Hoarseness    Other chest pain    ST segment depression    T wave inversion in EKG    History of ASCVD    Essential hypertension     BP (!) 162/79 (BP Location: Left arm, Patient Position: Sitting, BP Method: Medium (Automatic))   Pulse (!) 50   Ht 5' 5" (1.651 m)   Wt 74.2 kg (163 lb 9.3 oz)   SpO2 97%   BMI 27.22 kg/m²   Body mass index is 27.22 kg/m².  CrCl cannot be calculated (No order found.).    No results found for: NA, K, CL, CO2, BUN, CREATININE, GLU, HGBA1C, MG, AST, ALT, ALBUMIN, PROT, BILITOT, WBC, HGB, HCT, MCV, PLT, INR, PSA, TSH, CHOL, HDL, LDLCALC, TRIG    Current Outpatient Medications   Medication Sig    CODEINE/BUTALBITAL/ASA/CAFFEIN (FIORINAL-CODEINE #3 ORAL) Take 1 capsule by mouth every 6 to 8 hours as needed.    fexofenadine (ALLEGRA) 60 MG tablet Take 1 tablet (60 mg total) by mouth 2 (two) times daily. Take 1 tablet twice a day as needed for allergy    hydroCHLOROthiazide (HYDRODIURIL) 25 MG tablet Take 1 tablet (25 mg total) by mouth once daily.    rosuvastatin (CRESTOR) 40 MG Tab Take 1 tablet (40 mg total) by mouth every evening.     No current facility-administered medications for this visit.        Review of Systems "   Constitution: Negative for chills, decreased appetite, malaise/fatigue, night sweats, weight gain and weight loss.   Eyes: Negative for blurred vision, double vision, visual disturbance and visual halos.   Cardiovascular: Positive for chest pain. Negative for claudication, cyanosis, dyspnea on exertion, irregular heartbeat, leg swelling, near-syncope, orthopnea, palpitations, paroxysmal nocturnal dyspnea and syncope.   Respiratory: Negative for cough, hemoptysis, snoring, sputum production and wheezing.    Endocrine: Negative for cold intolerance, heat intolerance, polydipsia and polyphagia.   Hematologic/Lymphatic: Negative for adenopathy and bleeding problem. Does not bruise/bleed easily.   Skin: Negative for flushing, itching, poor wound healing and rash.   Musculoskeletal: Negative for arthritis, back pain, falls, gout, joint pain, joint swelling, muscle cramps, muscle weakness, myalgias, neck pain and stiffness.   Gastrointestinal: Negative for bloating, abdominal pain, anorexia, diarrhea, dysphagia, excessive appetite, flatus, hematemesis, jaundice, melena and nausea.   Genitourinary: Negative for hesitancy and incomplete emptying.   Neurological: Negative for aphonia, brief paralysis, difficulty with concentration, disturbances in coordination, excessive daytime sleepiness, dizziness, focal weakness, light-headedness, loss of balance and weakness.   Psychiatric/Behavioral: Negative for altered mental status, depression, hallucinations, hypervigilance, memory loss, substance abuse and suicidal ideas. The patient does not have insomnia and is not nervous/anxious.        Objective:   Physical Exam   Constitutional: She is oriented to person, place, and time. She appears well-developed and well-nourished. No distress.   HENT:   Head: Normocephalic and atraumatic.   Nose: Nose normal.   Mouth/Throat: Oropharynx is clear and moist. No oropharyngeal exudate.   Eyes: Pupils are equal, round, and reactive to light.  Conjunctivae and EOM are normal. Right eye exhibits no discharge. Left eye exhibits no discharge. No scleral icterus.   Neck: Normal range of motion. Neck supple. No JVD present. No tracheal deviation present. No thyromegaly present.   Cardiovascular: Normal rate, regular rhythm, normal heart sounds and intact distal pulses. Exam reveals no gallop and no friction rub.   No murmur heard.  Pulmonary/Chest: Effort normal and breath sounds normal. No stridor. No respiratory distress. She has no wheezes. She has no rales. She exhibits no tenderness.   Abdominal: Soft. Bowel sounds are normal. She exhibits no distension and no mass. There is no tenderness. There is no rebound and no guarding.   Musculoskeletal: Normal range of motion. She exhibits no edema or tenderness.   Lymphadenopathy:     She has no cervical adenopathy.   Neurological: She is alert and oriented to person, place, and time. She has normal reflexes. No cranial nerve deficit. She exhibits normal muscle tone. Coordination normal.   Skin: Skin is warm. No rash noted. She is not diaphoretic. No erythema. No pallor.   Psychiatric: She has a normal mood and affect. Her behavior is normal. Judgment and thought content normal.       Assessment:     1. Other chest pain    2. ST segment depression    3. T wave inversion in EKG    4. History of ASCVD    5. Essential hypertension        Plan:   Rachel was seen today for hypertension.    Diagnoses and all orders for this visit:    Other chest pain  -     Cancel: Echocardiogram stress test with CFD (Cupid Only); Future  -     Lipid panel; Future  -     Hepatic function panel; Future    ST segment depression    T wave inversion in EKG    History of ASCVD    Essential hypertension    Other orders  -     Discontinue: losartan (COZAAR) 50 MG tablet; Take 1 tablet (50 mg total) by mouth once daily.  -     Discontinue: hydroCHLOROthiazide (HYDRODIURIL) 25 MG tablet; Take 1 tablet (25 mg total) by mouth once daily.  -      hydroCHLOROthiazide (HYDRODIURIL) 25 MG tablet; Take 1 tablet (25 mg total) by mouth once daily.  -     rosuvastatin (CRESTOR) 40 MG Tab; Take 1 tablet (40 mg total) by mouth every evening.      We discussed risk -T wave inversions very concerning for LAD disease. ,LDL goal 70 , low threshold for starting PCSK9  Counseled on importance of heart healthy diet low in saturated and trans fat and salt as well gradually starting a regular aerobic exercise regimen with goal of 30min 5x/week. Recommend BP diary. Call if systolic BP > 130 mmHg on checking repeatedly      RTC 11 WKS.

## 2019-07-31 ENCOUNTER — HOSPITAL ENCOUNTER (OUTPATIENT)
Dept: CARDIOLOGY | Facility: CLINIC | Age: 74
Discharge: HOME | End: 2019-07-31
Attending: INTERNAL MEDICINE
Payer: MEDICARE

## 2019-07-31 ENCOUNTER — CLINICAL SUPPORT (OUTPATIENT)
Dept: CARDIOLOGY | Facility: HOSPITAL | Age: 74
End: 2019-07-31
Attending: INTERNAL MEDICINE
Payer: MEDICARE

## 2019-07-31 ENCOUNTER — TELEPHONE (OUTPATIENT)
Dept: CARDIOLOGY | Facility: CLINIC | Age: 74
End: 2019-07-31

## 2019-07-31 VITALS
SYSTOLIC BLOOD PRESSURE: 110 MMHG | DIASTOLIC BLOOD PRESSURE: 70 MMHG | HEART RATE: 110 BPM | WEIGHT: 163 LBS | BODY MASS INDEX: 27.16 KG/M2 | RESPIRATION RATE: 16 BRPM | HEIGHT: 65 IN

## 2019-07-31 DIAGNOSIS — I10 ESSENTIAL HYPERTENSION: ICD-10-CM

## 2019-07-31 DIAGNOSIS — R94.31 ST SEGMENT ABNORMALITY: Primary | ICD-10-CM

## 2019-07-31 DIAGNOSIS — I48.91 ATRIAL FIBRILLATION WITH RVR: Primary | ICD-10-CM

## 2019-07-31 DIAGNOSIS — R07.89 OTHER CHEST PAIN: ICD-10-CM

## 2019-07-31 DIAGNOSIS — R94.31 ST SEGMENT ABNORMALITY: ICD-10-CM

## 2019-07-31 DIAGNOSIS — Z86.79 HISTORY OF ASCVD: ICD-10-CM

## 2019-07-31 LAB
ASCENDING AORTA: 3.45 CM
AV INDEX (PROSTH): 0.72
AV MEAN GRADIENT: 2 MMHG
AV PEAK GRADIENT: 4 MMHG
AV VALVE AREA: 2.18 CM2
AV VELOCITY RATIO: 0.77
BSA FOR ECHO PROCEDURE: 1.84 M2
CV ECHO LV RWT: 0.39 CM
DOP CALC AO PEAK VEL: 1.06 M/S
DOP CALC AO VTI: 16.52 CM
DOP CALC LVOT AREA: 3 CM2
DOP CALC LVOT DIAMETER: 1.96 CM
DOP CALC LVOT PEAK VEL: 0.82 M/S
DOP CALC LVOT STROKE VOLUME: 36.04 CM3
DOP CALCLVOT PEAK VEL VTI: 11.95 CM
E/E' RATIO: 9.5 M/S
ECHO LV POSTERIOR WALL: 0.86 CM (ref 0.6–1.1)
FRACTIONAL SHORTENING: 30 % (ref 28–44)
INTERVENTRICULAR SEPTUM: 0.92 CM (ref 0.6–1.1)
IVRT: 0.1 MSEC
LA MAJOR: 5.8 CM
LA MINOR: 4.5 CM
LA WIDTH: 4.09 CM
LEFT ATRIUM SIZE: 4.25 CM
LEFT ATRIUM VOLUME INDEX: 41.3 ML/M2
LEFT ATRIUM VOLUME: 74.88 CM3
LEFT INTERNAL DIMENSION IN SYSTOLE: 3.12 CM (ref 2.1–4)
LEFT VENTRICLE DIASTOLIC VOLUME INDEX: 49.17 ML/M2
LEFT VENTRICLE DIASTOLIC VOLUME: 89.16 ML
LEFT VENTRICLE MASS INDEX: 70 G/M2
LEFT VENTRICLE SYSTOLIC VOLUME INDEX: 21.2 ML/M2
LEFT VENTRICLE SYSTOLIC VOLUME: 38.43 ML
LEFT VENTRICULAR INTERNAL DIMENSION IN DIASTOLE: 4.43 CM (ref 3.5–6)
LEFT VENTRICULAR MASS: 127.52 G
LV LATERAL E/E' RATIO: 11.4 M/S
LV SEPTAL E/E' RATIO: 8.14 M/S
MV PEAK E VEL: 0.57 M/S
PISA TR MAX VEL: 2.2 M/S
RA MAJOR: 5.39 CM
RA PRESSURE: 3 MMHG
RA WIDTH: 4.34 CM
RIGHT VENTRICULAR END-DIASTOLIC DIMENSION: 3.21 CM
RV TISSUE DOPPLER FREE WALL SYSTOLIC VELOCITY 1 (APICAL 4 CHAMBER VIEW): 12.08 CM/S
SINUS: 3.15 CM
STJ: 2.75 CM
TDI LATERAL: 0.05 M/S
TDI SEPTAL: 0.07 M/S
TDI: 0.06 M/S
TR MAX PG: 19 MMHG
TRICUSPID ANNULAR PLANE SYSTOLIC EXCURSION: 0.95 CM
TV REST PULMONARY ARTERY PRESSURE: 22 MMHG

## 2019-07-31 PROCEDURE — 93005 ELECTROCARDIOGRAM TRACING: CPT | Mod: S$GLB,,, | Performed by: INTERNAL MEDICINE

## 2019-07-31 PROCEDURE — 93227 XTRNL ECG REC<48 HR R&I: CPT | Mod: ,,, | Performed by: INTERNAL MEDICINE

## 2019-07-31 PROCEDURE — 93005 EKG 12-LEAD: ICD-10-PCS | Mod: S$GLB,,, | Performed by: INTERNAL MEDICINE

## 2019-07-31 PROCEDURE — 93306 TRANSTHORACIC ECHO (TTE) COMPLETE (CUPID ONLY): ICD-10-PCS | Mod: S$GLB,,, | Performed by: INTERNAL MEDICINE

## 2019-07-31 PROCEDURE — 93225 XTRNL ECG REC<48 HRS REC: CPT

## 2019-07-31 PROCEDURE — 93010 EKG 12-LEAD: ICD-10-PCS | Mod: S$GLB,,, | Performed by: INTERNAL MEDICINE

## 2019-07-31 PROCEDURE — 93227 HOLTER MONITOR - 48 HOUR (CUPID ONLY): ICD-10-PCS | Mod: ,,, | Performed by: INTERNAL MEDICINE

## 2019-07-31 PROCEDURE — 93306 TTE W/DOPPLER COMPLETE: CPT | Mod: S$GLB,,, | Performed by: INTERNAL MEDICINE

## 2019-07-31 PROCEDURE — 93010 ELECTROCARDIOGRAM REPORT: CPT | Mod: S$GLB,,, | Performed by: INTERNAL MEDICINE

## 2019-07-31 RX ORDER — ATROPINE SULFATE 0.1 MG/ML
1 INJECTION INTRAVENOUS
Status: DISCONTINUED | OUTPATIENT
Start: 2019-07-31 | End: 2019-07-31

## 2019-07-31 RX ORDER — DOBUTAMINE HYDROCHLORIDE 200 MG/100ML
10 INJECTION INTRAVENOUS
Status: DISCONTINUED | OUTPATIENT
Start: 2019-07-31 | End: 2019-07-31

## 2019-07-31 RX ORDER — METOPROLOL TARTRATE 1 MG/ML
5 INJECTION, SOLUTION INTRAVENOUS
Status: COMPLETED | OUTPATIENT
Start: 2019-07-31 | End: 2019-07-31

## 2019-07-31 RX ADMIN — METOPROLOL TARTRATE 5 MG: 1 INJECTION, SOLUTION INTRAVENOUS at 10:07

## 2019-07-31 NOTE — PROGRESS NOTES
"Pt here for dse. Pt connected to monitor and found to be in afib with rvr hr 160's to 170's with intermittent sinus rhythm. Pt stated she feels her heart racing and has felt this several times in the past but has  Become more frequent. ekg done. Shown to dr aquino. Stress portion cx. Dr aquino spoke with pt. Ordered cardiac PET scan and holter monitor.  Dr aquino also ordered 5 mg lopressor ivp . Dr aquino wanted ccfd done after lopressor was given. Lopressor given ivp over 5 minutes bp remained stable 130's / 80's throughout. Pt remain in afib throughout but broke to the 90's and ccfd was able to be done. Dr aquino told pt she was prescribing a beta blocker and a blood thinner and explained she was at risk of stroke.  Pt instructed by me on afib and what it was and why the need for blood thinner. Also instructed on sx of bleeding, black tarry stools, nose bleeds that wont stop,  Coffee ground emesis, and to hold pressure if she should cut herself  Instructed pt on reason for beta blocker ( rate control) and to watch position changes and take them slowly to prevent dizziness  Instructed on importance of timely meds.  Also instructed on sx of stroke and call 911 immediately as there is a 3 hour window to get the "clot buster" ( slurred speech, confusion, weakness on one side, facial droop,)   halter monitor placed after echo complete instructions given by benny in arrhythmia clinic.  Cardiac pet scan scheduled by alexa grande rn. He gave instructions on that procedure. Pt is to follow up with md.    Pt verbalizes understanding of above instructions.  Dr kendall evans in room during entire procedure.    Sl d/avelino after. Pressure applied. Pt ambulated to lobby without difficulty.  "

## 2019-08-02 ENCOUNTER — TELEPHONE (OUTPATIENT)
Dept: CARDIOLOGY | Facility: CLINIC | Age: 74
End: 2019-08-02

## 2019-08-02 RX ORDER — METOPROLOL SUCCINATE 25 MG/1
25 TABLET, EXTENDED RELEASE ORAL DAILY
Qty: 90 TABLET | Refills: 3 | Status: SHIPPED | OUTPATIENT
Start: 2019-08-02 | End: 2019-08-08

## 2019-08-02 NOTE — TELEPHONE ENCOUNTER
Pt notified to start Toprol xl 25 mg everyday and Xarelto 20 mg everyday . Pt verbalize understanding.

## 2019-08-02 NOTE — TELEPHONE ENCOUNTER
I am starting you on a blood thinner because of the irregular heart beat and on low dose heart rate medication. Please start that tonight.    thanks

## 2019-08-05 ENCOUNTER — TELEPHONE (OUTPATIENT)
Dept: CARDIOLOGY | Facility: CLINIC | Age: 74
End: 2019-08-05

## 2019-08-05 DIAGNOSIS — Z91.89 AT RISK FOR CORONARY ARTERY DISEASE: ICD-10-CM

## 2019-08-05 DIAGNOSIS — R94.31 ABNORMAL ELECTROCARDIOGRAM: ICD-10-CM

## 2019-08-05 NOTE — TELEPHONE ENCOUNTER
Please have the patient schedule a different kind of stress test as we could not finish the dobutamine. She knows I had talked do her in person about this.

## 2019-08-06 LAB
OHS CV EVENT MONITOR DAY: 0
OHS CV HOLTER LENGTH DECIMAL HOURS: 48
OHS CV HOLTER LENGTH HOURS: 48
OHS CV HOLTER LENGTH MINUTES: 0

## 2019-08-08 ENCOUNTER — TELEPHONE (OUTPATIENT)
Dept: CARDIOLOGY | Facility: CLINIC | Age: 74
End: 2019-08-08

## 2019-08-08 RX ORDER — DILTIAZEM HYDROCHLORIDE EXTENDED-RELEASE TABLETS 120 MG/1
120 TABLET, EXTENDED RELEASE ORAL DAILY
Qty: 90 TABLET | Refills: 3 | Status: SHIPPED | OUTPATIENT
Start: 2019-08-08 | End: 2019-08-20

## 2019-08-08 NOTE — TELEPHONE ENCOUNTER
You are having pretty fast heart beats in Afib. ,I am changing your medication to Dilitazem and stopping metoprolol. Will also discuss this further on the next appointment. If u think this medication is too much for you can cut the tablet in half.

## 2019-08-14 ENCOUNTER — CLINICAL SUPPORT (OUTPATIENT)
Dept: CARDIOLOGY | Facility: CLINIC | Age: 74
End: 2019-08-14
Attending: INTERNAL MEDICINE
Payer: MEDICARE

## 2019-08-14 VITALS — SYSTOLIC BLOOD PRESSURE: 137 MMHG | HEART RATE: 82 BPM | DIASTOLIC BLOOD PRESSURE: 67 MMHG

## 2019-08-14 DIAGNOSIS — R94.31 ST SEGMENT ABNORMALITY: ICD-10-CM

## 2019-08-14 LAB
CFR FLOW - ANTERIOR: 1.86 CC/MIN/G
CFR FLOW - INFERIOR: 1.6 CC/MIN/G
CFR FLOW - LATERAL: 1.83 CC/MIN/G
CFR FLOW - MAX: 2.4 CC/MIN/G
CFR FLOW - MIN: 1.3 CC/MIN/G
CFR FLOW - SEPTAL: 1.93 CC/MIN/G
CFR FLOW - WHOLE HEART: 1.81 CC/MIN/G
CV STRESS BASE HR: 59 BPM
DIASTOLIC BLOOD PRESSURE: 85 MMHG
END DIASTOLIC INDEX-HIGH: 170 ML/M2
END SYSTOLIC INDEX-HIGH: 70 ML/M2
OHS CV CPX 85 PERCENT MAX PREDICTED HEART RATE MALE: 122
OHS CV CPX MAX PREDICTED HEART RATE: 144
OHS CV CPX PATIENT IS FEMALE: 1
OHS CV CPX PATIENT IS MALE: 0
OHS CV CPX PEAK DIASTOLIC BLOOD PRESSURE: 84 MMHG
OHS CV CPX PEAK HEAR RATE: 67 BPM
OHS CV CPX PEAK RATE PRESSURE PRODUCT: 9916
OHS CV CPX PEAK SYSTOLIC BLOOD PRESSURE: 148 MMHG
OHS CV CPX PERCENT MAX PREDICTED HEART RATE ACHIEVED: 47
OHS CV CPX RATE PRESSURE PRODUCT PRESENTING: 8083
REST FLOW - ANTERIOR: 1.26 CC/MIN/G
REST FLOW - INFERIOR: 1.28 CC/MIN/G
REST FLOW - LATERAL: 1.15 CC/MIN/G
REST FLOW - MAX: 1.7 CC/MIN/G
REST FLOW - MIN: 0.4 CC/MIN/G
REST FLOW - SEPTAL: 1 CC/MIN/G
REST FLOW - WHOLE HEART: 1.17
RETIRED EF AND QEF - SEE NOTES: 51 %
STRESS ECHO TARGET HR: 126.65 BPM
STRESS FLOW - ANTERIOR: 2.03 CC/MIN/G
STRESS FLOW - INFERIOR: 1.37 CC/MIN/G
STRESS FLOW - LATERAL: 1.35 CC/MIN/G
STRESS FLOW - MAX: 2.6 CC/MIN/G
STRESS FLOW - MIN: 1.4 CC/MIN/G
STRESS FLOW - SEPTAL: 1.9 CC/MIN/G
STRESS FLOW - WHOLE HEART: 1.66 CC/MIN/G
SYSTOLIC BLOOD PRESSURE: 137 MMHG

## 2019-08-14 PROCEDURE — 99999 PR PBB SHADOW E&M-EST. PATIENT-LVL I: CPT | Mod: PBBFAC,,,

## 2019-08-14 PROCEDURE — A9555 RB82 RUBIDIUM: HCPCS | Mod: S$GLB,,, | Performed by: INTERNAL MEDICINE

## 2019-08-14 PROCEDURE — 78492 CARDIAC PET SCAN STRESS (CUPID ONLY): ICD-10-PCS | Mod: S$GLB,,, | Performed by: INTERNAL MEDICINE

## 2019-08-14 PROCEDURE — A9555 CARDIAC PET SCAN STRESS (CUPID ONLY): ICD-10-PCS | Mod: S$GLB,,, | Performed by: INTERNAL MEDICINE

## 2019-08-14 PROCEDURE — 93015 CARDIAC PET SCAN STRESS (CUPID ONLY): ICD-10-PCS | Mod: S$GLB,,, | Performed by: INTERNAL MEDICINE

## 2019-08-14 PROCEDURE — 93015 CV STRESS TEST SUPVJ I&R: CPT | Mod: S$GLB,,, | Performed by: INTERNAL MEDICINE

## 2019-08-14 PROCEDURE — 78492 MYOCRD IMG PET MLT RST&STRS: CPT | Mod: S$GLB,,, | Performed by: INTERNAL MEDICINE

## 2019-08-14 PROCEDURE — 99999 PR PBB SHADOW E&M-EST. PATIENT-LVL I: ICD-10-PCS | Mod: PBBFAC,,,

## 2019-08-14 RX ORDER — DIPYRIDAMOLE 5 MG/ML
41.49 INJECTION INTRAVENOUS
Status: DISCONTINUED | OUTPATIENT
Start: 2019-08-14 | End: 2019-12-27

## 2019-08-15 ENCOUNTER — TELEPHONE (OUTPATIENT)
Dept: CARDIOLOGY | Facility: CLINIC | Age: 74
End: 2019-08-15

## 2019-08-15 NOTE — TELEPHONE ENCOUNTER
----- Message from Neena Graff MD sent at 8/15/2019  1:20 PM CDT -----  Please let pt. Know that the stress test was normal

## 2019-08-16 ENCOUNTER — TELEPHONE (OUTPATIENT)
Dept: CARDIOLOGY | Facility: CLINIC | Age: 74
End: 2019-08-16

## 2019-08-20 ENCOUNTER — TELEPHONE (OUTPATIENT)
Dept: CARDIOLOGY | Facility: CLINIC | Age: 74
End: 2019-08-20

## 2019-08-20 RX ORDER — DILTIAZEM HYDROCHLORIDE 120 MG/1
120 CAPSULE, COATED, EXTENDED RELEASE ORAL DAILY
Qty: 30 CAPSULE | Refills: 11 | Status: CANCELLED | OUTPATIENT
Start: 2019-08-20 | End: 2020-08-19

## 2019-08-20 RX ORDER — DILTIAZEM HYDROCHLORIDE 120 MG/1
120 CAPSULE, EXTENDED RELEASE ORAL DAILY
Qty: 60 CAPSULE | Refills: 3 | Status: CANCELLED | OUTPATIENT
Start: 2019-08-20 | End: 2020-08-19

## 2019-08-20 RX ORDER — DILTIAZEM HYDROCHLORIDE EXTENDED-RELEASE TABLETS 180 MG/1
180 TABLET, EXTENDED RELEASE ORAL DAILY
Qty: 90 TABLET | Refills: 3 | Status: SHIPPED | OUTPATIENT
Start: 2019-08-20 | End: 2019-10-31

## 2019-08-20 NOTE — TELEPHONE ENCOUNTER
Cardizem LA request denied. Although written as diltiazem(cardizem LA) there is not a generic equivalent for Cardizem LA so had to be prior auth as was prescribed. Per insurance will cover plain diltiazem.  Alternatives covered are Diltiazem CD. Med order pended and routed to Dr. Graff for review.     [facility] [phone]

## 2019-08-20 NOTE — TELEPHONE ENCOUNTER
Diltiazem 180mg on formulary, prior auth denied for Diltiazem cardizem LA 120mg, med order pended for formulary.

## 2019-08-21 ENCOUNTER — PATIENT MESSAGE (OUTPATIENT)
Dept: CARDIOLOGY | Facility: CLINIC | Age: 74
End: 2019-08-21

## 2019-09-06 ENCOUNTER — LAB VISIT (OUTPATIENT)
Dept: LAB | Facility: HOSPITAL | Age: 74
End: 2019-09-06
Attending: INTERNAL MEDICINE
Payer: MEDICARE

## 2019-09-06 DIAGNOSIS — R07.89 OTHER CHEST PAIN: ICD-10-CM

## 2019-09-06 LAB
ALBUMIN SERPL BCP-MCNC: 4.3 G/DL (ref 3.5–5.2)
ALP SERPL-CCNC: 55 U/L (ref 55–135)
ALT SERPL W/O P-5'-P-CCNC: 35 U/L (ref 10–44)
AST SERPL-CCNC: 33 U/L (ref 10–40)
BILIRUB DIRECT SERPL-MCNC: 0.3 MG/DL (ref 0.1–0.3)
BILIRUB SERPL-MCNC: 0.5 MG/DL (ref 0.1–1)
CHOLEST SERPL-MCNC: 136 MG/DL (ref 120–199)
CHOLEST/HDLC SERPL: 2.3 {RATIO} (ref 2–5)
HDLC SERPL-MCNC: 60 MG/DL (ref 40–75)
HDLC SERPL: 44.1 % (ref 20–50)
LDLC SERPL CALC-MCNC: 66.2 MG/DL (ref 63–159)
NONHDLC SERPL-MCNC: 76 MG/DL
PROT SERPL-MCNC: 7.7 G/DL (ref 6–8.4)
TRIGL SERPL-MCNC: 49 MG/DL (ref 30–150)

## 2019-09-06 PROCEDURE — 80076 HEPATIC FUNCTION PANEL: CPT

## 2019-09-06 PROCEDURE — 36415 COLL VENOUS BLD VENIPUNCTURE: CPT

## 2019-09-06 PROCEDURE — 80061 LIPID PANEL: CPT

## 2019-09-10 ENCOUNTER — TELEPHONE (OUTPATIENT)
Dept: CARDIOLOGY | Facility: CLINIC | Age: 74
End: 2019-09-10

## 2019-10-31 ENCOUNTER — OFFICE VISIT (OUTPATIENT)
Dept: CARDIOLOGY | Facility: CLINIC | Age: 74
End: 2019-10-31
Payer: MEDICARE

## 2019-10-31 VITALS
HEIGHT: 64 IN | DIASTOLIC BLOOD PRESSURE: 62 MMHG | OXYGEN SATURATION: 94 % | SYSTOLIC BLOOD PRESSURE: 125 MMHG | BODY MASS INDEX: 28.49 KG/M2 | HEART RATE: 70 BPM | WEIGHT: 166.88 LBS

## 2019-10-31 DIAGNOSIS — Z91.89 AT RISK FOR CORONARY ARTERY DISEASE: ICD-10-CM

## 2019-10-31 DIAGNOSIS — R94.31 T WAVE INVERSION IN EKG: ICD-10-CM

## 2019-10-31 DIAGNOSIS — Z86.79 HISTORY OF ASCVD: ICD-10-CM

## 2019-10-31 DIAGNOSIS — I48.91 ATRIAL FIBRILLATION WITH RVR: ICD-10-CM

## 2019-10-31 DIAGNOSIS — R94.31 ST SEGMENT ABNORMALITY: ICD-10-CM

## 2019-10-31 DIAGNOSIS — I10 ESSENTIAL HYPERTENSION: Primary | ICD-10-CM

## 2019-10-31 PROCEDURE — 1101F PR PT FALLS ASSESS DOC 0-1 FALLS W/OUT INJ PAST YR: ICD-10-PCS | Mod: CPTII,S$GLB,, | Performed by: INTERNAL MEDICINE

## 2019-10-31 PROCEDURE — 99214 PR OFFICE/OUTPT VISIT, EST, LEVL IV, 30-39 MIN: ICD-10-PCS | Mod: S$GLB,,, | Performed by: INTERNAL MEDICINE

## 2019-10-31 PROCEDURE — 99214 OFFICE O/P EST MOD 30 MIN: CPT | Mod: S$GLB,,, | Performed by: INTERNAL MEDICINE

## 2019-10-31 PROCEDURE — 99999 PR PBB SHADOW E&M-EST. PATIENT-LVL III: ICD-10-PCS | Mod: PBBFAC,,, | Performed by: INTERNAL MEDICINE

## 2019-10-31 PROCEDURE — 1101F PT FALLS ASSESS-DOCD LE1/YR: CPT | Mod: CPTII,S$GLB,, | Performed by: INTERNAL MEDICINE

## 2019-10-31 PROCEDURE — 99999 PR PBB SHADOW E&M-EST. PATIENT-LVL III: CPT | Mod: PBBFAC,,, | Performed by: INTERNAL MEDICINE

## 2019-10-31 RX ORDER — DILTIAZEM HYDROCHLORIDE EXTENDED-RELEASE TABLETS 240 MG/1
240 TABLET, EXTENDED RELEASE ORAL DAILY
Qty: 90 TABLET | Refills: 3 | Status: SHIPPED | OUTPATIENT
Start: 2019-10-31 | End: 2020-11-02

## 2019-10-31 NOTE — PROGRESS NOTES
Subjective:   Patient ID:  Rachel Silver is a 73 y.o. female who presents for follow-up of Other chest pain (11 weeks fu)  Rachel Silver is a 73 y.o. female is a new patient who presents for evaluation of Hypertension     HPI:   Patient had chest pain going to the back radiating to the back. Patient was going up a hill and felt back pain and radiated to the chest. She had a regular treadmill stress test.   Ex smoker  Mother had CHF, uncle  of MI at the age of 48.  BP at home 152 or so.      ASCVD risk is 23%  EKG:  Sinus rhythm with T wave inversions and ST depression precordial leads.   T chol 250  HDL 60, TG 74     The ASCVD Risk score ([place] HERNÁNDEZ Jr., et al., 2013) failed to calculate for the following reasons:    Cannot find a previous HDL lab    Cannot find a previous total cholesterol lab    PET:   The relative PET images are normal showing no clinically significant regional resting or stress induced perfusion defects.    Whole heart absolute myocardial perfusion (cc/min/g) averaged 1.17  at rest (which is elevated), 1.66 cc/min/g at stress (which is mildly reduced), and 1.81 cc/min/g CFR (which is mildly reduced in part due to elevated resting flow).    Visually estimated ejection fraction is normal at rest and normal at stress.    Wall motion was normal at rest and during stress.    LV cavity size is normal at rest and stress.    The EKG portion of this study is negative for ischemia.    Arrhythmias during stress: frequent PVCs.    The patient reported no chest pain during the stress test.    There are no prior studies for comparison.    Echo  · AF with RVR noted.  · Mildly decreased left ventricular systolic function. The estimated ejection fraction is 40%. Significant beat to beat variability in the setting of AF. EF may be underestimated.  · Indeterminate left ventricular diastolic function.  · Normal right ventricular systolic function.  · Mild left atrial enlargement.  · The  "estimated PA systolic pressure is 22 mm Hg  · Normal central venous pressure (3 mm Hg).  · DSE aborted due to fast AF. Findings and plan discussed with the patient.    HPI:   Patient is exercising patient does lion dancing.  No chest pain, Orthopnea, PND of heart failure symptoms.   Patient had minimal palpitation.     Patient Active Problem List   Diagnosis    Hoarseness    Other chest pain    ST segment depression    T wave inversion in EKG    History of ASCVD    Essential hypertension     /62 (BP Location: Left arm, Patient Position: Sitting, BP Method: Large (Automatic))   Pulse 70   Ht 5' 4" (1.626 m)   Wt 75.7 kg (166 lb 14.2 oz)   SpO2 (!) 94%   BMI 28.65 kg/m²   Body mass index is 28.65 kg/m².  CrCl cannot be calculated (No successful lab value found.).    Lab Results   Component Value Date    AST 33 09/06/2019    ALT 35 09/06/2019    ALBUMIN 4.3 09/06/2019    PROT 7.7 09/06/2019    BILITOT 0.5 09/06/2019    CHOL 136 09/06/2019    HDL 60 09/06/2019    LDLCALC 66.2 09/06/2019    TRIG 49 09/06/2019       Current Outpatient Medications   Medication Sig    CODEINE/BUTALBITAL/ASA/CAFFEIN (FIORINAL-CODEINE #3 ORAL) Take 1 capsule by mouth every 6 to 8 hours as needed.    fexofenadine (ALLEGRA) 60 MG tablet Take 1 tablet (60 mg total) by mouth 2 (two) times daily. Take 1 tablet twice a day as needed for allergy    hydroCHLOROthiazide (HYDRODIURIL) 25 MG tablet Take 1 tablet (25 mg total) by mouth once daily.    rivaroxaban (XARELTO) 20 mg Tab Take 1 tablet (20 mg total) by mouth once daily.    rosuvastatin (CRESTOR) 40 MG Tab Take 1 tablet (40 mg total) by mouth every evening.    diltiaZEM HCl (CARDIZEM LA) 240 mg 24 hr tablet Take 1 tablet (240 mg total) by mouth once daily.     Current Facility-Administered Medications   Medication    dipyridamole injection 41.5 mg       Review of Systems   Constitution: Negative for chills, decreased appetite, malaise/fatigue, night sweats, weight gain " and weight loss.   Eyes: Negative for blurred vision, double vision, visual disturbance and visual halos.   Cardiovascular: Negative for chest pain, claudication, cyanosis, dyspnea on exertion, irregular heartbeat, leg swelling, near-syncope, orthopnea, palpitations, paroxysmal nocturnal dyspnea and syncope.   Respiratory: Negative for cough, hemoptysis, snoring, sputum production and wheezing.    Endocrine: Negative for cold intolerance, heat intolerance, polydipsia and polyphagia.   Hematologic/Lymphatic: Negative for adenopathy and bleeding problem. Does not bruise/bleed easily.   Skin: Negative for flushing, itching, poor wound healing and rash.   Musculoskeletal: Negative for arthritis, back pain, falls, gout, joint pain, joint swelling, muscle cramps, muscle weakness, myalgias, neck pain and stiffness.   Gastrointestinal: Negative for bloating, abdominal pain, anorexia, diarrhea, dysphagia, excessive appetite, flatus, hematemesis, jaundice, melena and nausea.   Genitourinary: Negative for hesitancy and incomplete emptying.   Neurological: Negative for aphonia, brief paralysis, difficulty with concentration, disturbances in coordination, excessive daytime sleepiness, dizziness, focal weakness, light-headedness, loss of balance and weakness.   Psychiatric/Behavioral: Negative for altered mental status, depression, hallucinations, hypervigilance, memory loss, substance abuse and suicidal ideas. The patient does not have insomnia and is not nervous/anxious.        Objective:   Physical Exam   Constitutional: She is oriented to person, place, and time. She appears well-developed and well-nourished. No distress.   HENT:   Head: Normocephalic and atraumatic.   Nose: Nose normal.   Mouth/Throat: Oropharynx is clear and moist. No oropharyngeal exudate.   Eyes: Pupils are equal, round, and reactive to light. Conjunctivae and EOM are normal. Right eye exhibits no discharge. Left eye exhibits no discharge. No scleral  icterus.   Neck: Normal range of motion. Neck supple. No JVD present. No tracheal deviation present. No thyromegaly present.   Cardiovascular: Normal rate, regular rhythm, normal heart sounds and intact distal pulses. Exam reveals no gallop and no friction rub.   No murmur heard.  Pulmonary/Chest: Effort normal and breath sounds normal. No stridor. No respiratory distress. She has no wheezes. She has no rales. She exhibits no tenderness.   Abdominal: Soft. Bowel sounds are normal. She exhibits no distension and no mass. There is no tenderness. There is no rebound and no guarding.   Musculoskeletal: Normal range of motion. She exhibits no edema or tenderness.   Lymphadenopathy:     She has no cervical adenopathy.   Neurological: She is alert and oriented to person, place, and time. She has normal reflexes. No cranial nerve deficit. She exhibits normal muscle tone. Coordination normal.   Skin: Skin is warm. No rash noted. She is not diaphoretic. No erythema. No pallor.   Psychiatric: She has a normal mood and affect. Her behavior is normal. Judgment and thought content normal.       Assessment:     1. Essential hypertension    2. At risk for coronary artery disease    3. Atrial fibrillation with RVR    4. History of ASCVD    5. T wave inversion in EKG    6. ST segment abnormality        Plan:   Will re-echo her to evaluate LV function.  Also will do a an event monitor to see if the burden. Continue all medications.  Increased diltiazem for better rate control.     Rachel was seen today for other chest pain.    Diagnoses and all orders for this visit:    Essential hypertension    At risk for coronary artery disease  -     Echo Color Flow Doppler? No; 2D? Yes; Limited Follow-Up Exam? Yes; Bubble Contrast? No; Future    Atrial fibrillation with RVR  -     Echo Color Flow Doppler? No; 2D? Yes; Limited Follow-Up Exam? Yes; Bubble Contrast? No; Future  -     Cardiac event monitor; Future    History of ASCVD    T wave  inversion in EKG    ST segment abnormality    Other orders  -     diltiaZEM HCl (CARDIZEM LA) 240 mg 24 hr tablet; Take 1 tablet (240 mg total) by mouth once daily.

## 2019-11-05 ENCOUNTER — CLINICAL SUPPORT (OUTPATIENT)
Dept: CARDIOLOGY | Facility: HOSPITAL | Age: 74
End: 2019-11-05
Attending: INTERNAL MEDICINE
Payer: MEDICARE

## 2019-11-05 DIAGNOSIS — I48.91 ATRIAL FIBRILLATION WITH RVR: ICD-10-CM

## 2019-11-05 PROCEDURE — 93271 ECG/MONITORING AND ANALYSIS: CPT

## 2019-11-05 PROCEDURE — 93272 CARDIAC EVENT MONITOR (CUPID ONLY): ICD-10-PCS | Mod: ,,, | Performed by: INTERNAL MEDICINE

## 2019-11-05 PROCEDURE — 93272 ECG/REVIEW INTERPRET ONLY: CPT | Mod: ,,, | Performed by: INTERNAL MEDICINE

## 2019-12-26 ENCOUNTER — TELEPHONE (OUTPATIENT)
Dept: CARDIOLOGY | Facility: CLINIC | Age: 74
End: 2019-12-26

## 2019-12-26 DIAGNOSIS — I49.8 OTHER SPECIFIED CARDIAC ARRHYTHMIAS: Primary | ICD-10-CM

## 2019-12-26 DIAGNOSIS — I47.10 SVT (SUPRAVENTRICULAR TACHYCARDIA): Primary | ICD-10-CM

## 2019-12-26 DIAGNOSIS — I48.19 PERSISTENT ATRIAL FIBRILLATION: ICD-10-CM

## 2019-12-26 NOTE — TELEPHONE ENCOUNTER
Spoke with patient, on test results verbalized understanding. Scheduled appointment with Dr Perez.

## 2019-12-26 NOTE — TELEPHONE ENCOUNTER
Your event monitor shows your heart rates to be really fast as fast as 160-170 bmp some of them were atrial fibrillation and some of them a fast heart beat called supraventricular tachycardia. I am referring you to an electrophysiologist to manage your fast heart rates.     Mikala, please make her an appointment with Bobby Perez

## 2019-12-27 ENCOUNTER — INITIAL CONSULT (OUTPATIENT)
Dept: ELECTROPHYSIOLOGY | Facility: CLINIC | Age: 74
End: 2019-12-27
Payer: MEDICARE

## 2019-12-27 ENCOUNTER — HOSPITAL ENCOUNTER (OUTPATIENT)
Dept: CARDIOLOGY | Facility: CLINIC | Age: 74
Discharge: HOME | End: 2019-12-27
Attending: INTERNAL MEDICINE
Payer: MEDICARE

## 2019-12-27 VITALS — HEART RATE: 65 BPM | DIASTOLIC BLOOD PRESSURE: 90 MMHG | SYSTOLIC BLOOD PRESSURE: 135 MMHG

## 2019-12-27 VITALS
HEART RATE: 60 BPM | BODY MASS INDEX: 28.51 KG/M2 | SYSTOLIC BLOOD PRESSURE: 135 MMHG | WEIGHT: 167 LBS | DIASTOLIC BLOOD PRESSURE: 90 MMHG | HEIGHT: 64 IN

## 2019-12-27 DIAGNOSIS — I48.0 PAF (PAROXYSMAL ATRIAL FIBRILLATION): ICD-10-CM

## 2019-12-27 DIAGNOSIS — I10 ESSENTIAL HYPERTENSION: ICD-10-CM

## 2019-12-27 DIAGNOSIS — I49.8 OTHER SPECIFIED CARDIAC ARRHYTHMIAS: ICD-10-CM

## 2019-12-27 DIAGNOSIS — I10 ESSENTIAL HYPERTENSION: Primary | ICD-10-CM

## 2019-12-27 LAB
ASCENDING AORTA: 3.44 CM
AV INDEX (PROSTH): 0.75
AV MEAN GRADIENT: 4 MMHG
AV PEAK GRADIENT: 8 MMHG
AV VALVE AREA: 2.75 CM2
AV VELOCITY RATIO: 0.77
BSA FOR ECHO PROCEDURE: 1.85 M2
CV ECHO LV RWT: 0.34 CM
DOP CALC AO PEAK VEL: 1.37 M/S
DOP CALC AO VTI: 28.55 CM
DOP CALC LVOT AREA: 3.7 CM2
DOP CALC LVOT DIAMETER: 2.16 CM
DOP CALC LVOT PEAK VEL: 1.06 M/S
DOP CALC LVOT STROKE VOLUME: 78.38 CM3
DOP CALCLVOT PEAK VEL VTI: 21.4 CM
E WAVE DECELERATION TIME: 226.09 MSEC
E/A RATIO: 0.77
E/E' RATIO: 10.17 M/S
ECHO LV POSTERIOR WALL: 0.8 CM (ref 0.6–1.1)
FRACTIONAL SHORTENING: 26 % (ref 28–44)
INTERVENTRICULAR SEPTUM: 0.8 CM (ref 0.6–1.1)
LA MAJOR: 4.86 CM
LA MINOR: 5.45 CM
LA WIDTH: 3.29 CM
LEFT ATRIUM SIZE: 4.5 CM
LEFT ATRIUM VOLUME INDEX: 35.7 ML/M2
LEFT ATRIUM VOLUME: 64.66 CM3
LEFT INTERNAL DIMENSION IN SYSTOLE: 3.42 CM (ref 2.1–4)
LEFT VENTRICLE DIASTOLIC VOLUME INDEX: 55.12 ML/M2
LEFT VENTRICLE DIASTOLIC VOLUME: 99.87 ML
LEFT VENTRICLE MASS INDEX: 66 G/M2
LEFT VENTRICLE SYSTOLIC VOLUME INDEX: 26.5 ML/M2
LEFT VENTRICLE SYSTOLIC VOLUME: 48.04 ML
LEFT VENTRICULAR INTERNAL DIMENSION IN DIASTOLE: 4.65 CM (ref 3.5–6)
LEFT VENTRICULAR MASS: 120.07 G
LV LATERAL E/E' RATIO: 8.71 M/S
LV SEPTAL E/E' RATIO: 12.2 M/S
MV PEAK A VEL: 0.79 M/S
MV PEAK E VEL: 0.61 M/S
PISA TR MAX VEL: 2.2 M/S
PULM VEIN S/D RATIO: 1.22
PV PEAK D VEL: 0.37 M/S
PV PEAK S VEL: 0.45 M/S
RA MAJOR: 4.55 CM
RA PRESSURE: 3 MMHG
RA WIDTH: 3.71 CM
RIGHT VENTRICULAR END-DIASTOLIC DIMENSION: 3 CM
SINUS: 3.26 CM
STJ: 3.13 CM
TDI LATERAL: 0.07 M/S
TDI SEPTAL: 0.05 M/S
TDI: 0.06 M/S
TR MAX PG: 19 MMHG
TRICUSPID ANNULAR PLANE SYSTOLIC EXCURSION: 1.58 CM
TV REST PULMONARY ARTERY PRESSURE: 22 MMHG

## 2019-12-27 PROCEDURE — 1101F PR PT FALLS ASSESS DOC 0-1 FALLS W/OUT INJ PAST YR: ICD-10-PCS | Mod: CPTII,S$GLB,, | Performed by: INTERNAL MEDICINE

## 2019-12-27 PROCEDURE — 1159F MED LIST DOCD IN RCRD: CPT | Mod: S$GLB,,, | Performed by: INTERNAL MEDICINE

## 2019-12-27 PROCEDURE — 1126F AMNT PAIN NOTED NONE PRSNT: CPT | Mod: S$GLB,,, | Performed by: INTERNAL MEDICINE

## 2019-12-27 PROCEDURE — 1159F PR MEDICATION LIST DOCUMENTED IN MEDICAL RECORD: ICD-10-PCS | Mod: S$GLB,,, | Performed by: INTERNAL MEDICINE

## 2019-12-27 PROCEDURE — 99205 OFFICE O/P NEW HI 60 MIN: CPT | Mod: S$GLB,,, | Performed by: INTERNAL MEDICINE

## 2019-12-27 PROCEDURE — 93010 RHYTHM STRIP: ICD-10-PCS | Mod: S$GLB,,, | Performed by: INTERNAL MEDICINE

## 2019-12-27 PROCEDURE — 93005 RHYTHM STRIP: ICD-10-PCS | Mod: S$GLB,,, | Performed by: INTERNAL MEDICINE

## 2019-12-27 PROCEDURE — 93306 ECHO (CUPID ONLY): ICD-10-PCS | Mod: S$GLB,,, | Performed by: INTERNAL MEDICINE

## 2019-12-27 PROCEDURE — 99999 PR PBB SHADOW E&M-EST. PATIENT-LVL III: CPT | Mod: PBBFAC,,, | Performed by: INTERNAL MEDICINE

## 2019-12-27 PROCEDURE — 99205 PR OFFICE/OUTPT VISIT, NEW, LEVL V, 60-74 MIN: ICD-10-PCS | Mod: S$GLB,,, | Performed by: INTERNAL MEDICINE

## 2019-12-27 PROCEDURE — 93005 ELECTROCARDIOGRAM TRACING: CPT | Mod: S$GLB,,, | Performed by: INTERNAL MEDICINE

## 2019-12-27 PROCEDURE — 99999 PR PBB SHADOW E&M-EST. PATIENT-LVL III: ICD-10-PCS | Mod: PBBFAC,,, | Performed by: INTERNAL MEDICINE

## 2019-12-27 PROCEDURE — 93306 TTE W/DOPPLER COMPLETE: CPT | Mod: S$GLB,,, | Performed by: INTERNAL MEDICINE

## 2019-12-27 PROCEDURE — 1126F PR PAIN SEVERITY QUANTIFIED, NO PAIN PRESENT: ICD-10-PCS | Mod: S$GLB,,, | Performed by: INTERNAL MEDICINE

## 2019-12-27 PROCEDURE — 1101F PT FALLS ASSESS-DOCD LE1/YR: CPT | Mod: CPTII,S$GLB,, | Performed by: INTERNAL MEDICINE

## 2019-12-27 PROCEDURE — 93010 ELECTROCARDIOGRAM REPORT: CPT | Mod: S$GLB,,, | Performed by: INTERNAL MEDICINE

## 2019-12-27 NOTE — PROGRESS NOTES
Subjective:    Patient ID:  Rachel Silver is a 74 y.o. female who presents for evaluation of AF    HPI  74 y.o. F  HTN on meds  HL on meds  PAF    Tachypalpitations on and off for years. Usually goes away by itself.  Now, has them several times per month. Lasts 5-10 mins.   Great exertion tolerance.    echo 7/19 LVEF 40% (during rapid AF)  Holter 7/19 SR, PAF/AFL  PET neg  event mon: SR, PAF/PAFL/SVT?    My interpretation of today's ECG is NSR 65 bpm    Review of Systems   Constitution: Negative. Negative for malaise/fatigue.   HENT: Negative.  Negative for ear pain and tinnitus.    Eyes: Negative for blurred vision.   Cardiovascular: Positive for palpitations. Negative for chest pain, dyspnea on exertion, near-syncope and syncope.   Respiratory: Negative.  Negative for shortness of breath.    Endocrine: Negative.  Negative for polyuria.   Hematologic/Lymphatic: Does not bruise/bleed easily.   Skin: Negative.  Negative for rash.   Musculoskeletal: Negative.  Negative for joint pain and muscle weakness.   Gastrointestinal: Negative.  Negative for abdominal pain and change in bowel habit.   Genitourinary: Negative for frequency.   Neurological: Negative.  Negative for dizziness and weakness.   Psychiatric/Behavioral: Negative.  Negative for depression. The patient is not nervous/anxious.    Allergic/Immunologic: Negative for environmental allergies.        Objective:    Physical Exam   Constitutional: She is oriented to person, place, and time. Vital signs are normal. She appears well-developed and well-nourished. She is active and cooperative.   HENT:   Head: Normocephalic and atraumatic.   Eyes: Conjunctivae and EOM are normal.   Neck: Normal range of motion. Carotid bruit is not present. No tracheal deviation and no edema present. No thyroid mass and no thyromegaly present.   Cardiovascular: Normal rate, regular rhythm, normal heart sounds, intact distal pulses and normal pulses.  No extrasystoles are present.  PMI is not displaced. Exam reveals no gallop and no friction rub.   No murmur heard.  Pulmonary/Chest: Effort normal and breath sounds normal. No respiratory distress. She has no wheezes. She has no rales.   Abdominal: Soft. Normal appearance. She exhibits no distension. There is no hepatosplenomegaly.   Musculoskeletal: Normal range of motion.   Neurological: She is alert and oriented to person, place, and time. Coordination normal.   Skin: Skin is warm and dry. No rash noted.   Psychiatric: She has a normal mood and affect. Her speech is normal and behavior is normal. Thought content normal. Cognition and memory are normal.   Nursing note and vitals reviewed.        Assessment:       1. Essential hypertension    2. PAF (paroxysmal atrial fibrillation)    3. Other specified cardiac arrhythmias         Plan:       Discussed AF and its basic pathophysiology, including its health implications and treatment options (rate vs. rhythm control, meds vs. procedural/device treatment) as appropriate for the patient.  May also have SVT, which may be well treated with treatment of AF.    Echo. If preserved LVEF, would be candidate for flecainide.  TSH    f/u 3 mos *unless LVEF remains low, in which case f/u 1 month*

## 2019-12-27 NOTE — LETTER
December 27, 2019      Neena Graff MD  [address]  [address]  [place] [address]           [facility] - Arrhythmia  [address]  [facility] [address]  Phone: [phone]  Fax: [phone]          Patient: Rachel Silver   MR Number: [id number]   YOB: 1945   Date of Visit: 12/27/2019       Dear Dr. Neena Graff:    Thank you for referring Rachel Silver to me for evaluation. Attached you will find relevant portions of my assessment and plan of care.    If you have questions, please do not hesitate to call me. I look forward to following Rachel Silver along with you.    Sincerely,    Vik Perez MD    Enclosure  CC:  No Recipients    If you would like to receive this communication electronically, please contact [email or web address] or [phone] to request more information on [employer] access.    For providers and/or their staff who would like to refer a patient to [facility], please contact us through our one-stop-shop provider referral line, [facility], at [phone].    If you feel you have received this communication in error or would no longer like to receive these types of communications, please e-mail [email or web address]

## 2019-12-28 RX ORDER — FLECAINIDE ACETATE 100 MG/1
100 TABLET ORAL EVERY 12 HOURS
Qty: 180 TABLET | Refills: 3 | Status: SHIPPED | OUTPATIENT
Start: 2019-12-28 | End: 2021-01-04

## 2019-12-30 ENCOUNTER — TELEPHONE (OUTPATIENT)
Dept: ELECTROPHYSIOLOGY | Facility: CLINIC | Age: 74
End: 2019-12-30

## 2019-12-30 NOTE — TELEPHONE ENCOUNTER
Spoke with pt. Notified her that Dr. Perez has ordered Flecainide. EF ~60% on 12/27/19. Per Dr. Perez, start Flecainide. F/U in 3 months. Pt states that she scheduled her follow up. She reports that her pharmacy will not have Flecainide in for at least another day and she is going on vacation for 4 days. She will start Flecainide when she returns if medication isn't available by tomorrow.

## 2020-03-19 ENCOUNTER — TELEPHONE (OUTPATIENT)
Dept: ELECTROPHYSIOLOGY | Facility: CLINIC | Age: 75
End: 2020-03-19

## 2020-03-19 NOTE — TELEPHONE ENCOUNTER
Spoke w/ pt & offered virtual visit. Pt declined b/c she states that she isnt having any problems/afib. Pt said she can tell when she's in afib & if she has any problems she will give us a call, but for now she will wait to be r/s.   ----- Message from Dasha Hong sent at 3/19/2020  3:03 PM CDT -----  Contact: Patient  The pt is returning a call. Please call her back @ [phone]. Thanks, Dasha

## 2020-03-19 NOTE — TELEPHONE ENCOUNTER
Left pt a message informing her that we would need to cancel appts. Offered virtual visit on 3/27. Instructed to call back to confirm.

## 2020-05-26 ENCOUNTER — LAB VISIT (OUTPATIENT)
Dept: URGENT CARE | Facility: CLINIC | Age: 75
End: 2020-05-26
Payer: MEDICARE

## 2020-05-26 ENCOUNTER — TELEPHONE (OUTPATIENT)
Dept: OTOLARYNGOLOGY | Facility: CLINIC | Age: 75
End: 2020-05-26

## 2020-05-26 DIAGNOSIS — R49.0 HOARSENESS: ICD-10-CM

## 2020-05-26 DIAGNOSIS — R49.0 HOARSENESS: Primary | ICD-10-CM

## 2020-05-26 LAB — SARS-COV-2 RNA RESP QL NAA+PROBE: NOT DETECTED

## 2020-05-26 PROCEDURE — U0003 INFECTIOUS AGENT DETECTION BY NUCLEIC ACID (DNA OR RNA); SEVERE ACUTE RESPIRATORY SYNDROME CORONAVIRUS 2 (SARS-COV-2) (CORONAVIRUS DISEASE [COVID-19]), AMPLIFIED PROBE TECHNIQUE, MAKING USE OF HIGH THROUGHPUT TECHNOLOGIES AS DESCRIBED BY CMS-2020-01-R: HCPCS

## 2020-05-28 ENCOUNTER — OFFICE VISIT (OUTPATIENT)
Dept: OTOLARYNGOLOGY | Facility: CLINIC | Age: 75
End: 2020-05-28
Payer: MEDICARE

## 2020-05-28 VITALS
WEIGHT: 170 LBS | HEIGHT: 64 IN | DIASTOLIC BLOOD PRESSURE: 80 MMHG | TEMPERATURE: 98 F | SYSTOLIC BLOOD PRESSURE: 156 MMHG | BODY MASS INDEX: 29.02 KG/M2

## 2020-05-28 DIAGNOSIS — J38.3 LESION OF TRUE VOCAL CORD: Primary | ICD-10-CM

## 2020-05-28 PROCEDURE — 1126F PR PAIN SEVERITY QUANTIFIED, NO PAIN PRESENT: ICD-10-PCS | Mod: S$GLB,,, | Performed by: OTOLARYNGOLOGY

## 2020-05-28 PROCEDURE — 99214 PR OFFICE/OUTPT VISIT, EST, LEVL IV, 30-39 MIN: ICD-10-PCS | Mod: S$GLB,,, | Performed by: OTOLARYNGOLOGY

## 2020-05-28 PROCEDURE — 1100F PTFALLS ASSESS-DOCD GE2>/YR: CPT | Mod: CPTII,S$GLB,, | Performed by: OTOLARYNGOLOGY

## 2020-05-28 PROCEDURE — 1159F PR MEDICATION LIST DOCUMENTED IN MEDICAL RECORD: ICD-10-PCS | Mod: S$GLB,,, | Performed by: OTOLARYNGOLOGY

## 2020-05-28 PROCEDURE — 3079F DIAST BP 80-89 MM HG: CPT | Mod: CPTII,S$GLB,, | Performed by: OTOLARYNGOLOGY

## 2020-05-28 PROCEDURE — 99214 OFFICE O/P EST MOD 30 MIN: CPT | Mod: S$GLB,,, | Performed by: OTOLARYNGOLOGY

## 2020-05-28 PROCEDURE — 3288F FALL RISK ASSESSMENT DOCD: CPT | Mod: CPTII,S$GLB,, | Performed by: OTOLARYNGOLOGY

## 2020-05-28 PROCEDURE — 1100F PR PT FALLS ASSESS DOC 2+ FALLS/FALL W/INJURY/YR: ICD-10-PCS | Mod: CPTII,S$GLB,, | Performed by: OTOLARYNGOLOGY

## 2020-05-28 PROCEDURE — 3288F PR FALLS RISK ASSESSMENT DOCUMENTED: ICD-10-PCS | Mod: CPTII,S$GLB,, | Performed by: OTOLARYNGOLOGY

## 2020-05-28 PROCEDURE — 3079F PR MOST RECENT DIASTOLIC BLOOD PRESSURE 80-89 MM HG: ICD-10-PCS | Mod: CPTII,S$GLB,, | Performed by: OTOLARYNGOLOGY

## 2020-05-28 PROCEDURE — 3077F SYST BP >= 140 MM HG: CPT | Mod: CPTII,S$GLB,, | Performed by: OTOLARYNGOLOGY

## 2020-05-28 PROCEDURE — 1159F MED LIST DOCD IN RCRD: CPT | Mod: S$GLB,,, | Performed by: OTOLARYNGOLOGY

## 2020-05-28 PROCEDURE — 3077F PR MOST RECENT SYSTOLIC BLOOD PRESSURE >= 140 MM HG: ICD-10-PCS | Mod: CPTII,S$GLB,, | Performed by: OTOLARYNGOLOGY

## 2020-05-28 PROCEDURE — 1126F AMNT PAIN NOTED NONE PRSNT: CPT | Mod: S$GLB,,, | Performed by: OTOLARYNGOLOGY

## 2020-05-28 RX ORDER — LIDOCAINE HYDROCHLORIDE 10 MG/ML
1 INJECTION, SOLUTION EPIDURAL; INFILTRATION; INTRACAUDAL; PERINEURAL ONCE
Status: CANCELLED | OUTPATIENT
Start: 2020-05-28 | End: 2020-05-28

## 2020-05-28 RX ORDER — SODIUM CHLORIDE 0.9 % (FLUSH) 0.9 %
10 SYRINGE (ML) INJECTION
Status: DISCONTINUED | OUTPATIENT
Start: 2020-05-28 | End: 2023-11-18 | Stop reason: HOSPADM

## 2020-05-28 NOTE — H&P (VIEW-ONLY)
"OTOLARYNGOLOGY CLINIC NOTE  Date:  05/28/2020     Chief complaint:  Chief Complaint   Patient presents with    Other     Hoarseness       History of Present Illness  Rachel Silver is a 74 y.o. female  presenting today for a new evaluation and treatment of hoarseness. Pt reports she had a nodule removed by Dr. Mittal 10-11-17. Review of surgical pathology shows that patient actually had  "marked" squamous dysplasia without definitive evidence of invasive disease. ON with follow up visit with dr. Mittal  he noted only mild erythema of the vocal cord. She had a follow up visit with Dr. Feng ( 6-2019) that showed no abnormality of the larynx, patient was asymptomatic at that time, and was told to follow up if she had recurrent symptoms. This is the first visit she has had with me. She noted that she started getting hoarse again over past 1 week. Has not had hoarseness for a while . Doesn't like to use nasal sprays. Has never tried   +throat clearing' phlegm. She also gets heartburn especially; has recently gained weight ( 20 pounds) notices after eats cookie. Has been eating more spicy food lately.   + globus  No dysphagia. No throat pain.  Not a singer, talks a lot.    2-3 cups of coffee per day. Drinks not a lot water    Past Medical History  Past Medical History:   Diagnosis Date    Atrial fibrillation     Cataracts, bilateral     Hoarse     Hypertension         Past Surgical History  Past Surgical History:   Procedure Laterality Date    HYSTERECTOMY      Microlaryngoscopy with biopsy vocal cords      TONSILLECTOMY      torn meniscus          Medications  Current Outpatient Medications on File Prior to Visit   Medication Sig Dispense Refill    CODEINE/BUTALBITAL/ASA/CAFFEIN (FIORINAL-CODEINE #3 ORAL) Take 1 capsule by mouth every 6 to 8 hours as needed.      diltiaZEM HCl (CARDIZEM LA) 240 mg 24 hr tablet Take 1 tablet (240 mg total) by mouth once daily. 90 tablet 3    flecainide (TAMBOCOR) 100 " "MG Tab Take 1 tablet (100 mg total) by mouth every 12 (twelve) hours. 180 tablet 3    hydroCHLOROthiazide (HYDRODIURIL) 25 MG tablet Take 1 tablet (25 mg total) by mouth once daily. 90 tablet 3    rivaroxaban (XARELTO) 20 mg Tab Take 1 tablet (20 mg total) by mouth once daily. 90 tablet 3    rosuvastatin (CRESTOR) 40 MG Tab Take 1 tablet (40 mg total) by mouth every evening. 90 tablet 3    fexofenadine (ALLEGRA) 60 MG tablet Take 1 tablet (60 mg total) by mouth 2 (two) times daily. Take 1 tablet twice a day as needed for allergy (Patient not taking: Reported on 5/28/2020) 30 tablet 11     No current facility-administered medications on file prior to visit.        Review of Systems  Review of Systems   Constitutional: Negative for fever.   HENT: Positive for congestion.    Respiratory: Negative for sputum production.    Cardiovascular: Negative for palpitations.   Gastrointestinal: Positive for heartburn.   Musculoskeletal: Negative for neck pain.   Neurological: Negative for headaches.   Endo/Heme/Allergies: Positive for environmental allergies.        Social History   reports that she quit smoking about 32 years ago. She smoked 1.50 packs per day. She has never used smokeless tobacco. She reports that she drinks alcohol. She reports that she does not use drugs.     Family History  Family History   Problem Relation Age of Onset    Heart failure Mother     Stroke Father, brother     Heart disease Maternal Aunt,Maternal Uncle         Physical Exam   Vitals:    05/28/20 1356   BP: (!) 156/80   Temp: 98.3 °F (36.8 °C)    Body mass index is 29.18 kg/m².  Weight: 77.1 kg (169 lb 15.6 oz)   Height: 5' 4" (162.6 cm)     GENERAL: alert, no acute distress.  HEAD: normocephalic. No palpable bony stepoffs. No tenderness to palpation of face.  SKIN: no lesions of skin of head and neck area.  EYES: lids and lashes normal. Pupils equal and reactive to light  Extraocular motions intact. No proptosis  EARS: external ear " without lesion, normal pinna shape and position. Tympanic membranes without perforation. No middle ear effusion  NOSE: external nose without abnormality, septum deviated to left  ORAL CAVITY/OROPHARYNX: dentition fair,  no lesion of gingiva/buccal mucosa/floor of mouth/tongue. tongue midline and mobile. No fasciculations.   NECK: trachea midline. No discrete palpable thyroid nodule. No parotid mass nor tenderness. No submandibular gland mass nor tenderness. No scars.  LYMPH NODES:No cervical lymphadenopathy.  RESPIRATORY: no stridor, no stertor. Voice raspy without phonic gaps, no tremor. Respirations nonlabored.  NEURO: alert, responds to questions appropriately.   PSYCH:mood appropriate      PROCEDURE NOTE  NAME OF PROCEDURE: Flexible Laryngoscopy, diagnostic  INDICATIONS: hoarseness, h/o vocal cord dysplasia  PRE-PROCEDURE DIAGNOSIS:dysplasia vs LPR vs mass  POST-PROCEDURE DIAGNOSIS: dysplasia vs scca  FINDINGS: left vocal fold with dysplasia appearance and central thickening concerning for scca in situ vs invasive scca    Consent: After procedure was explained in detail and all questions answered, verbal consent was obtained for performing flexible laryngoscopy.  Anesthesia: topical 4% lidocaine and neosynephrine  Procedure: With patient in seated position, the scope was inserted into the right nasal passageway and advanced atraumatically into the nasopharynx to examine the following structures  Nasopharynx: no mass or lesion noted in nasopharynx.   Oropharynx: base of tongue without  mass or ulceration. Lingual tonsils normal in appearance  Hypopharynx: posterior pharyngeal wall without mass or lesion. No pooling of secretions. Pyriform sinuses visible without mass or lesion  Larynx: epiglottis normal without lesion. False vocal folds without edema/erythema/lesion. True vocal folds mobile left vocal fold with erythema just anterior to vocal process extending to mid cord with thickened keratotic appearance in  middle of erythematous area. Does not appear to be extending into ventricle or to anterior commissure.  No interarytenoid edema nor erythema . Postcricoid region without edema or lesion  Subglottis: visualized portion of subglottis normal in appearance    After examination performed, the scope was removed atraumatically . The patient tolerated the procedure well.    Imaging:  The patient has no pertinent and/or recent imaging of the head and neck.    Labs:  No recent labs    Assessment  1. Lesion of true vocal cord      Plan:  Discussed plan of care with patient in detail and all questions answered. Patient reported understanding of plan of care.     Micro DL with  Biopsy; will try to perform excisional biopsy if lesion amenable. Pt aware that purpose of surgery is for diagnosis and may need further treatment. The lesion does not extend into the glottis and should not be a concern for intubation. She will need a smaller endotracheal tube to allow for more room for the surgery .   She will need to get preop workup and additionally be seen by her cardiologist. Ideally would be able to come off of blood thinner prior to procedure however if cardiology deems this to be a risk will do procedure with her on it . If this is invasive scca, it would be T1. I do not palpate any LAD. CT not needed.     Surgery scheduled for June 11; all questions answered and consent obtained

## 2020-05-28 NOTE — PROGRESS NOTES
Pt advised to get Cardiac Clearance prior to her Surgery on 6/11/20.  Patient was given all instructions before Surgery and her Post op appt.

## 2020-05-29 ENCOUNTER — PATIENT MESSAGE (OUTPATIENT)
Dept: ELECTROPHYSIOLOGY | Facility: CLINIC | Age: 75
End: 2020-05-29

## 2020-06-08 ENCOUNTER — OFFICE VISIT (OUTPATIENT)
Dept: CARDIOLOGY | Facility: CLINIC | Age: 75
End: 2020-06-08
Payer: MEDICARE

## 2020-06-08 ENCOUNTER — HOSPITAL ENCOUNTER (OUTPATIENT)
Dept: PREADMISSION TESTING | Facility: HOSPITAL | Age: 75
Discharge: HOME | End: 2020-06-08
Attending: OTOLARYNGOLOGY
Payer: MEDICARE

## 2020-06-08 VITALS
BODY MASS INDEX: 28.1 KG/M2 | DIASTOLIC BLOOD PRESSURE: 70 MMHG | HEART RATE: 74 BPM | SYSTOLIC BLOOD PRESSURE: 143 MMHG | WEIGHT: 168.63 LBS | HEIGHT: 65 IN

## 2020-06-08 VITALS
WEIGHT: 169.75 LBS | DIASTOLIC BLOOD PRESSURE: 80 MMHG | SYSTOLIC BLOOD PRESSURE: 131 MMHG | RESPIRATION RATE: 16 BRPM | HEART RATE: 74 BPM | TEMPERATURE: 98 F | BODY MASS INDEX: 28.28 KG/M2 | OXYGEN SATURATION: 97 % | HEIGHT: 65 IN

## 2020-06-08 DIAGNOSIS — Z86.79 HISTORY OF ASCVD: ICD-10-CM

## 2020-06-08 DIAGNOSIS — J38.3 LESION OF TRUE VOCAL CORD: ICD-10-CM

## 2020-06-08 DIAGNOSIS — Z01.810 PRE-OPERATIVE CARDIOVASCULAR EXAMINATION: Primary | ICD-10-CM

## 2020-06-08 DIAGNOSIS — I48.0 PAF (PAROXYSMAL ATRIAL FIBRILLATION): ICD-10-CM

## 2020-06-08 DIAGNOSIS — E78.5 HYPERLIPIDEMIA, UNSPECIFIED HYPERLIPIDEMIA TYPE: ICD-10-CM

## 2020-06-08 DIAGNOSIS — I10 ESSENTIAL HYPERTENSION: ICD-10-CM

## 2020-06-08 LAB
BASOPHILS # BLD AUTO: 0.07 K/UL (ref 0–0.2)
BASOPHILS NFR BLD: 0.9 % (ref 0–1.9)
DIFFERENTIAL METHOD: NORMAL
EOSINOPHIL # BLD AUTO: 0.1 K/UL (ref 0–0.5)
EOSINOPHIL NFR BLD: 1.6 % (ref 0–8)
ERYTHROCYTE [DISTWIDTH] IN BLOOD BY AUTOMATED COUNT: 12.8 % (ref 11.5–14.5)
HCT VFR BLD AUTO: 37.7 % (ref 37–48.5)
HGB BLD-MCNC: 13.1 G/DL (ref 12–16)
IMM GRANULOCYTES # BLD AUTO: 0.02 K/UL (ref 0–0.04)
IMM GRANULOCYTES NFR BLD AUTO: 0.3 % (ref 0–0.5)
LYMPHOCYTES # BLD AUTO: 2 K/UL (ref 1–4.8)
LYMPHOCYTES NFR BLD: 27 % (ref 18–48)
MCH RBC QN AUTO: 29.6 PG (ref 27–31)
MCHC RBC AUTO-ENTMCNC: 34.7 G/DL (ref 32–36)
MCV RBC AUTO: 85 FL (ref 82–98)
MONOCYTES # BLD AUTO: 0.7 K/UL (ref 0.3–1)
MONOCYTES NFR BLD: 9.8 % (ref 4–15)
NEUTROPHILS # BLD AUTO: 4.5 K/UL (ref 1.8–7.7)
NEUTROPHILS NFR BLD: 60.4 % (ref 38–73)
NRBC BLD-RTO: 0 /100 WBC
PLATELET # BLD AUTO: 215 K/UL (ref 150–350)
PMV BLD AUTO: 11.3 FL (ref 9.2–12.9)
RBC # BLD AUTO: 4.43 M/UL (ref 4–5.4)
SARS-COV-2 RNA RESP QL NAA+PROBE: NOT DETECTED
WBC # BLD AUTO: 7.37 K/UL (ref 3.9–12.7)

## 2020-06-08 PROCEDURE — 99999 PR PBB SHADOW E&M-EST. PATIENT-LVL III: ICD-10-PCS | Mod: PBBFAC,,, | Performed by: NURSE PRACTITIONER

## 2020-06-08 PROCEDURE — 1126F PR PAIN SEVERITY QUANTIFIED, NO PAIN PRESENT: ICD-10-PCS | Mod: S$GLB,,, | Performed by: NURSE PRACTITIONER

## 2020-06-08 PROCEDURE — 3288F PR FALLS RISK ASSESSMENT DOCUMENTED: ICD-10-PCS | Mod: CPTII,S$GLB,, | Performed by: NURSE PRACTITIONER

## 2020-06-08 PROCEDURE — 3074F SYST BP LT 130 MM HG: CPT | Mod: CPTII,S$GLB,, | Performed by: NURSE PRACTITIONER

## 2020-06-08 PROCEDURE — U0003 INFECTIOUS AGENT DETECTION BY NUCLEIC ACID (DNA OR RNA); SEVERE ACUTE RESPIRATORY SYNDROME CORONAVIRUS 2 (SARS-COV-2) (CORONAVIRUS DISEASE [COVID-19]), AMPLIFIED PROBE TECHNIQUE, MAKING USE OF HIGH THROUGHPUT TECHNOLOGIES AS DESCRIBED BY CMS-2020-01-R: HCPCS

## 2020-06-08 PROCEDURE — 93005 ELECTROCARDIOGRAM TRACING: CPT

## 2020-06-08 PROCEDURE — 99214 OFFICE O/P EST MOD 30 MIN: CPT | Mod: 25,S$GLB,, | Performed by: NURSE PRACTITIONER

## 2020-06-08 PROCEDURE — 3078F PR MOST RECENT DIASTOLIC BLOOD PRESSURE < 80 MM HG: ICD-10-PCS | Mod: CPTII,S$GLB,, | Performed by: NURSE PRACTITIONER

## 2020-06-08 PROCEDURE — 93010 ELECTROCARDIOGRAM REPORT: CPT | Mod: ,,, | Performed by: INTERNAL MEDICINE

## 2020-06-08 PROCEDURE — 1159F MED LIST DOCD IN RCRD: CPT | Mod: S$GLB,,, | Performed by: NURSE PRACTITIONER

## 2020-06-08 PROCEDURE — 3288F FALL RISK ASSESSMENT DOCD: CPT | Mod: CPTII,S$GLB,, | Performed by: NURSE PRACTITIONER

## 2020-06-08 PROCEDURE — 3078F DIAST BP <80 MM HG: CPT | Mod: CPTII,S$GLB,, | Performed by: NURSE PRACTITIONER

## 2020-06-08 PROCEDURE — 1126F AMNT PAIN NOTED NONE PRSNT: CPT | Mod: S$GLB,,, | Performed by: NURSE PRACTITIONER

## 2020-06-08 PROCEDURE — 36415 COLL VENOUS BLD VENIPUNCTURE: CPT

## 2020-06-08 PROCEDURE — 1100F PR PT FALLS ASSESS DOC 2+ FALLS/FALL W/INJURY/YR: ICD-10-PCS | Mod: CPTII,S$GLB,, | Performed by: NURSE PRACTITIONER

## 2020-06-08 PROCEDURE — 99999 PR PBB SHADOW E&M-EST. PATIENT-LVL III: CPT | Mod: PBBFAC,,, | Performed by: NURSE PRACTITIONER

## 2020-06-08 PROCEDURE — 3074F PR MOST RECENT SYSTOLIC BLOOD PRESSURE < 130 MM HG: ICD-10-PCS | Mod: CPTII,S$GLB,, | Performed by: NURSE PRACTITIONER

## 2020-06-08 PROCEDURE — 1100F PTFALLS ASSESS-DOCD GE2>/YR: CPT | Mod: CPTII,S$GLB,, | Performed by: NURSE PRACTITIONER

## 2020-06-08 PROCEDURE — 99214 PR OFFICE/OUTPT VISIT, EST, LEVL IV, 30-39 MIN: ICD-10-PCS | Mod: 25,S$GLB,, | Performed by: NURSE PRACTITIONER

## 2020-06-08 PROCEDURE — 85025 COMPLETE CBC W/AUTO DIFF WBC: CPT

## 2020-06-08 PROCEDURE — 1159F PR MEDICATION LIST DOCUMENTED IN MEDICAL RECORD: ICD-10-PCS | Mod: S$GLB,,, | Performed by: NURSE PRACTITIONER

## 2020-06-08 PROCEDURE — 93010 EKG 12-LEAD: ICD-10-PCS | Mod: ,,, | Performed by: INTERNAL MEDICINE

## 2020-06-08 NOTE — PATIENT INSTRUCTIONS
Recommend holding your xarelto 48 hours prior to your surgery.   Continue all other medications.     Follow up in 2-3 weeks via phone or portal with BP readings.

## 2020-06-08 NOTE — DISCHARGE INSTRUCTIONS
Your surgery is scheduled for _Thursday June 11, 2020__.    Call [phone] between 2 p.m. and 5 p.m. on   _Wednesday_ to find out your arrival time for the day of your surgery.                                 Emergency Room  Please report to SAME DAY SURGERY UNIT on the 2nd FLOOR at _______ a.m.        INSTRUCTIONS IMPORTANT!!!  ¨ Do not eat or drink after 12 midnight-including water. OK to brush teeth, no   gum, candy or mints!    ¨ Take only these medicines with a small swallow of water-morning of surgery.  Take Flecainide and Diltiazem with swallow of water      _x___  Prep instructions:    SHOWER    _x___  Do not wear makeup, including mascara. WEARING EYE MAKEUP MAY LEAD TO SERIOUS EYE INJURY during surgery.  _x___  No powder, lotions or creams to your body.  _x___  You may wear only deodorant on the day of surgery.  _x___  Please remove all jewelry, including piercings and leave at home.  _x___  No money or valuables needed. Please leave at home.  You may bring your  cell phone.  ____  Please bring any documents given by your doctor.  _x___  If going home the same day, arrange for a ride home. You will not be able to   drive if Anesthesia was used.    _x___  Wear loose fitting clothing. Allow for dressings, bandages.  _x___  Stop Aspirin, Ibuprofen, Motrin and Aleve at least 3-5 days before  surgery, unless otherwise instructed by your doctor, or the nurse.              You MAY use Tylenol/acetaminophen until day of surgery.  .  _x___  Call MD for temperature above 101 degrees.        _x___ Stop taking any Fish Oil supplement or any Vitamins that contain Vitamin  E at least 5 days prior to surgery.          I have read or had read and explained to me, and understand the above information.  Additional comments or instructions:Please call   [phone] if you have any questions regarding the instructions above.

## 2020-06-08 NOTE — PROGRESS NOTES
Cardiology Consults Clinic Note    Subjective:   Chief Complaint: Cardiac pre-op evaluation   Last Clinic Visit: 10/31/2019 with Dr. Graff     Problems:  HTN   HLD  Paroxysmal atrial fibrillation     History of Present Illness: Rachel Silver is a 74 y.o. female who is seen in clinic today for cardiac pre-op evaluation. She is scheduled to undergo laryngoscopy with Dr. Durham on 06/11/2020.   Reports feeling well and is without any cardiac concerns. Started on flecainide 100 mg BID in January for Afib.  Reports tolerating medication and is without palpitations or any known recurrence of afib.  Also remains on xarelto. Denies bleeding.    Denies chest pain at rest or with exertion. Denies SOB or LEVINE.   Remains active walking and biking. She is able to achieve METS>4.   BP borderline elevated at clinic today. Routinely monitors BP at home, reports systolic pressures  120s-140s. Compliant with medications.     Admits to eating salty snacks.     Review of Systems   Constitution: Negative for decreased appetite, fever, malaise/fatigue and weight gain.   HENT: Positive for hoarse voice. Negative for congestion, hearing loss and nosebleeds.    Eyes: Negative for visual disturbance.   Cardiovascular: Negative for chest pain, dyspnea on exertion, irregular heartbeat, leg swelling, palpitations and syncope.   Respiratory: Negative for cough, shortness of breath and sleep disturbances due to breathing.    Endocrine: Negative for cold intolerance and heat intolerance.   Hematologic/Lymphatic: Negative for bleeding problem. Does not bruise/bleed easily.   Musculoskeletal: Positive for arthritis.   Gastrointestinal: Negative for bloating, abdominal pain, change in bowel habit and heartburn.   Neurological: Negative for dizziness, loss of balance and numbness.   Psychiatric/Behavioral: Negative for altered mental status. The patient is not nervous/anxious.        Medications:  Patient's Medications   New  "Prescriptions    No medications on file   Previous Medications    CODEINE/BUTALBITAL/ASA/CAFFEIN (FIORINAL-CODEINE #3 ORAL)    Take 1 capsule by mouth every 6 to 8 hours as needed.    DILTIAZEM HCL (CARDIZEM LA) 240 MG 24 HR TABLET    Take 1 tablet (240 mg total) by mouth once daily.    FEXOFENADINE (ALLEGRA) 60 MG TABLET    Take 1 tablet (60 mg total) by mouth 2 (two) times daily. Take 1 tablet twice a day as needed for allergy    FLECAINIDE (TAMBOCOR) 100 MG TAB    Take 1 tablet (100 mg total) by mouth every 12 (twelve) hours.    HYDROCHLOROTHIAZIDE (HYDRODIURIL) 25 MG TABLET    Take 1 tablet (25 mg total) by mouth once daily.    RIVAROXABAN (XARELTO) 20 MG TAB    Take 1 tablet (20 mg total) by mouth once daily.    ROSUVASTATIN (CRESTOR) 40 MG TAB    Take 1 tablet (40 mg total) by mouth every evening.   Modified Medications    No medications on file   Discontinued Medications    No medications on file       Family History:  Rachel's family history includes Heart disease in her maternal aunt, maternal aunt, maternal aunt, maternal uncle, and maternal uncle; Heart failure in her mother; Stroke in her brother and father.    Social History:  Rachel reports that she quit smoking about 32 years ago. She smoked 1.50 packs per day. She has never used smokeless tobacco. She reports that she drinks alcohol. She reports that she does not use drugs.    Objective:   BP (!) 143/70 (BP Location: Left arm, Patient Position: Sitting, BP Method: Large (Automatic))   Pulse 74   Ht 5' 5" (1.651 m)   Wt 76.5 kg (168 lb 10.4 oz)   BMI 28.07 kg/m²     Physical Exam   Constitutional: She is oriented to person, place, and time. Vital signs are normal. She appears well-developed and well-nourished.   HENT:   Head: Normocephalic.   Eyes: Pupils are equal, round, and reactive to light.   Neck: Normal range of motion. Neck supple. No JVD present. Carotid bruit is not present.   Cardiovascular: Normal rate, regular rhythm, S1 normal, " S2 normal, normal heart sounds and intact distal pulses. PMI is not displaced. Exam reveals no gallop and no friction rub.   No murmur heard.  Pulses:       Carotid pulses are 2+ on the right side, and 2+ on the left side.       Radial pulses are 2+ on the right side, and 2+ on the left side.        Dorsalis pedis pulses are 2+ on the right side, and 2+ on the left side.        Posterior tibial pulses are 1+ on the right side, and 1+ on the left side.   Pulmonary/Chest: Effort normal and breath sounds normal. No accessory muscle usage. She has no decreased breath sounds. She has no wheezes.   Abdominal: Soft. Normal appearance and bowel sounds are normal. She exhibits no distension, no fluid wave and no ascites. There is no hepatosplenomegaly. There is no tenderness.   Musculoskeletal: Normal range of motion. She exhibits no edema.   Neurological: She is alert and oriented to person, place, and time. She has normal strength.   Skin: Skin is warm, dry and intact. No ecchymosis and no rash noted. No erythema.   Psychiatric: She has a normal mood and affect. Her speech is normal and behavior is normal. Judgment and thought content normal. Cognition and memory are normal.   Vitals reviewed.      EKG:  My independent visualization of most recent EKG is sinus bradycardia 58 bpm with incomplete RBBB    TTE (12/27/2019):  · Normal left ventricular systolic function. The estimated ejection fraction is 60%  · Indeterminate left ventricular diastolic function.  · No wall motion abnormalities.  · Normal right ventricular systolic function.  · Mild left atrial enlargement.  · The estimated PA systolic pressure is 22 mm Hg  · Normal central venous pressure (3 mm Hg).    PET Stress (08/14/2019):     The relative PET images are normal showing no clinically significant regional resting or stress induced perfusion defects.    Whole heart absolute myocardial perfusion (cc/min/g) averaged 1.17  at rest (which is elevated), 1.66  cc/min/g at stress (which is mildly reduced), and 1.81 cc/min/g CFR (which is mildly reduced in part due to elevated resting flow).    Visually estimated ejection fraction is normal at rest and normal at stress.    Wall motion was normal at rest and during stress.    LV cavity size is normal at rest and stress.    The EKG portion of this study is negative for ischemia.    Arrhythmias during stress: frequent PVCs.    The patient reported no chest pain during the stress test.    There are no prior studies for comparison.    Lipids:  Recent Labs   Lab 09/06/19  0941   LDL Cholesterol 66.2   HDL 60   Cholesterol 136      Renal:      Liver:  Recent Labs   Lab 09/06/19  0941   AST 33   ALT 35       Assessment:     1. Pre-operative cardiovascular examination    2. Essential hypertension    3. Hyperlipidemia, unspecified hyperlipidemia type    4. History of ASCVD    5. PAF (paroxysmal atrial fibrillation)      Plan:     Rachel Silver was seen in clinic today for cardiac pre-op evaluation     Diagnoses and associated orders include:      Pre-operative cardiovascular examination  Comments:  Revised cardiac risk index of 0 with risk of major adverse cardiovascular events at 30 days of 3.9 %   Able to achieve METS>4 without any symptoms  No further cardiac workup needed and proceed with surgery at the above mentioned risk.   Hold xarelto 48 hours prior to planned procedure. Resume as soon as possible and once safe from surgical bleeding risk. Continue all other cardiac medications.     Essential hypertension  Comments:  BP with borderline control  Instructed to monitor BP at home x 2 weeks and f/u with readings  Continue current regimen   Low salt diet, limit salty snacks    Hyperlipidemia, unspecified hyperlipidemia type  Comments:  LDL at goal  Continue high intensity statin     History of ASCVD    PAF (paroxysmal atrial fibrillation)  Comments:  SB on EKG  Stable on current regimen  Recommend routine f/u with   Chris         Follow up as needed     This patient was discussed with WILLIAM Joyce  06/08/2020       Follow-up:     Future Appointments   Date Time Provider Department Center   6/19/2020  4:00 PM Yesenia Durham MD [facility]

## 2020-06-10 ENCOUNTER — ANESTHESIA EVENT (OUTPATIENT)
Dept: SURGERY | Facility: HOSPITAL | Age: 75
End: 2020-06-10
Payer: MEDICARE

## 2020-06-11 ENCOUNTER — HOSPITAL ENCOUNTER (OUTPATIENT)
Facility: HOSPITAL | Age: 75
Discharge: HOME | End: 2020-06-11
Attending: OTOLARYNGOLOGY | Admitting: OTOLARYNGOLOGY
Payer: MEDICARE

## 2020-06-11 ENCOUNTER — ANESTHESIA (OUTPATIENT)
Dept: SURGERY | Facility: HOSPITAL | Age: 75
End: 2020-06-11
Payer: MEDICARE

## 2020-06-11 VITALS
HEART RATE: 54 BPM | WEIGHT: 168.63 LBS | HEIGHT: 65 IN | TEMPERATURE: 98 F | SYSTOLIC BLOOD PRESSURE: 131 MMHG | RESPIRATION RATE: 16 BRPM | DIASTOLIC BLOOD PRESSURE: 63 MMHG | OXYGEN SATURATION: 98 % | BODY MASS INDEX: 28.1 KG/M2

## 2020-06-11 DIAGNOSIS — J38.3 LESION OF TRUE VOCAL CORD: Primary | ICD-10-CM

## 2020-06-11 PROCEDURE — 36000708 HC OR TIME LEV III 1ST 15 MIN: Performed by: OTOLARYNGOLOGY

## 2020-06-11 PROCEDURE — 63600175 PHARM REV CODE 636 W HCPCS: Performed by: NURSE ANESTHETIST, CERTIFIED REGISTERED

## 2020-06-11 PROCEDURE — 63600175 PHARM REV CODE 636 W HCPCS: Performed by: ANESTHESIOLOGY

## 2020-06-11 PROCEDURE — 71000015 HC POSTOP RECOV 1ST HR: Performed by: OTOLARYNGOLOGY

## 2020-06-11 PROCEDURE — 88342 CHG IMMUNOCYTOCHEMISTRY: ICD-10-PCS | Mod: 26,,, | Performed by: PATHOLOGY

## 2020-06-11 PROCEDURE — 88305 TISSUE EXAM BY PATHOLOGIST: CPT | Mod: 26,,, | Performed by: PATHOLOGY

## 2020-06-11 PROCEDURE — 37000008 HC ANESTHESIA 1ST 15 MINUTES: Performed by: OTOLARYNGOLOGY

## 2020-06-11 PROCEDURE — 37000009 HC ANESTHESIA EA ADD 15 MINS: Performed by: OTOLARYNGOLOGY

## 2020-06-11 PROCEDURE — 36000709 HC OR TIME LEV III EA ADD 15 MIN: Performed by: OTOLARYNGOLOGY

## 2020-06-11 PROCEDURE — 71000033 HC RECOVERY, INTIAL HOUR: Performed by: OTOLARYNGOLOGY

## 2020-06-11 PROCEDURE — D9220A PRA ANESTHESIA: ICD-10-PCS | Mod: CRNA,,, | Performed by: NURSE ANESTHETIST, CERTIFIED REGISTERED

## 2020-06-11 PROCEDURE — 88305 TISSUE EXAM BY PATHOLOGIST: ICD-10-PCS | Mod: 26,,, | Performed by: PATHOLOGY

## 2020-06-11 PROCEDURE — 31536 PR LARYNGOSCOPY,DIRCT,OP SCOPE,BIOPSY: ICD-10-PCS | Mod: ,,, | Performed by: OTOLARYNGOLOGY

## 2020-06-11 PROCEDURE — 25000003 PHARM REV CODE 250: Performed by: OTOLARYNGOLOGY

## 2020-06-11 PROCEDURE — 88305 TISSUE EXAM BY PATHOLOGIST: CPT | Performed by: PATHOLOGY

## 2020-06-11 PROCEDURE — D9220A PRA ANESTHESIA: Mod: ANES,,, | Performed by: ANESTHESIOLOGY

## 2020-06-11 PROCEDURE — 71000039 HC RECOVERY, EACH ADD'L HOUR: Performed by: OTOLARYNGOLOGY

## 2020-06-11 PROCEDURE — 31536 LARYNGOSCOPY W/BX & OP SCOPE: CPT | Mod: ,,, | Performed by: OTOLARYNGOLOGY

## 2020-06-11 PROCEDURE — D9220A PRA ANESTHESIA: Mod: CRNA,,, | Performed by: NURSE ANESTHETIST, CERTIFIED REGISTERED

## 2020-06-11 PROCEDURE — 88342 IMHCHEM/IMCYTCHM 1ST ANTB: CPT | Performed by: PATHOLOGY

## 2020-06-11 PROCEDURE — 88342 IMHCHEM/IMCYTCHM 1ST ANTB: CPT | Mod: 26,,, | Performed by: PATHOLOGY

## 2020-06-11 PROCEDURE — 25000003 PHARM REV CODE 250: Performed by: NURSE ANESTHETIST, CERTIFIED REGISTERED

## 2020-06-11 PROCEDURE — D9220A PRA ANESTHESIA: ICD-10-PCS | Mod: ANES,,, | Performed by: ANESTHESIOLOGY

## 2020-06-11 RX ORDER — HYDROMORPHONE HYDROCHLORIDE 2 MG/ML
0.2 INJECTION, SOLUTION INTRAMUSCULAR; INTRAVENOUS; SUBCUTANEOUS EVERY 5 MIN PRN
Status: DISCONTINUED | OUTPATIENT
Start: 2020-06-11 | End: 2020-06-11 | Stop reason: HOSPADM

## 2020-06-11 RX ORDER — MIDAZOLAM HYDROCHLORIDE 1 MG/ML
INJECTION, SOLUTION INTRAMUSCULAR; INTRAVENOUS
Status: DISCONTINUED | OUTPATIENT
Start: 2020-06-11 | End: 2020-06-11

## 2020-06-11 RX ORDER — GLYCOPYRROLATE 0.2 MG/ML
INJECTION INTRAMUSCULAR; INTRAVENOUS
Status: DISCONTINUED | OUTPATIENT
Start: 2020-06-11 | End: 2020-06-11

## 2020-06-11 RX ORDER — ACETAMINOPHEN 10 MG/ML
1000 INJECTION, SOLUTION INTRAVENOUS ONCE
Status: COMPLETED | OUTPATIENT
Start: 2020-06-11 | End: 2020-06-11

## 2020-06-11 RX ORDER — LIDOCAINE HYDROCHLORIDE AND EPINEPHRINE 10; 10 MG/ML; UG/ML
INJECTION, SOLUTION INFILTRATION; PERINEURAL
Status: DISCONTINUED | OUTPATIENT
Start: 2020-06-11 | End: 2020-06-11 | Stop reason: HOSPADM

## 2020-06-11 RX ORDER — ROCURONIUM BROMIDE 10 MG/ML
INJECTION, SOLUTION INTRAVENOUS
Status: DISCONTINUED | OUTPATIENT
Start: 2020-06-11 | End: 2020-06-11

## 2020-06-11 RX ORDER — LIDOCAINE HYDROCHLORIDE 10 MG/ML
1 INJECTION, SOLUTION EPIDURAL; INFILTRATION; INTRACAUDAL; PERINEURAL ONCE
Status: DISCONTINUED | OUTPATIENT
Start: 2020-06-11 | End: 2020-06-11 | Stop reason: HOSPADM

## 2020-06-11 RX ORDER — SODIUM CHLORIDE 0.9 % (FLUSH) 0.9 %
3 SYRINGE (ML) INJECTION
Status: DISCONTINUED | OUTPATIENT
Start: 2020-06-11 | End: 2020-06-11 | Stop reason: HOSPADM

## 2020-06-11 RX ORDER — NEOSTIGMINE METHYLSULFATE 1 MG/ML
INJECTION, SOLUTION INTRAVENOUS
Status: DISCONTINUED | OUTPATIENT
Start: 2020-06-11 | End: 2020-06-11

## 2020-06-11 RX ORDER — PROPOFOL 10 MG/ML
VIAL (ML) INTRAVENOUS
Status: DISCONTINUED | OUTPATIENT
Start: 2020-06-11 | End: 2020-06-11

## 2020-06-11 RX ORDER — SODIUM CHLORIDE, SODIUM LACTATE, POTASSIUM CHLORIDE, CALCIUM CHLORIDE 600; 310; 30; 20 MG/100ML; MG/100ML; MG/100ML; MG/100ML
INJECTION, SOLUTION INTRAVENOUS CONTINUOUS
Status: DISCONTINUED | OUTPATIENT
Start: 2020-06-11 | End: 2020-06-11 | Stop reason: HOSPADM

## 2020-06-11 RX ORDER — FENTANYL CITRATE 50 UG/ML
INJECTION, SOLUTION INTRAMUSCULAR; INTRAVENOUS
Status: DISCONTINUED | OUTPATIENT
Start: 2020-06-11 | End: 2020-06-11

## 2020-06-11 RX ORDER — FENTANYL CITRATE 50 UG/ML
25 INJECTION, SOLUTION INTRAMUSCULAR; INTRAVENOUS EVERY 5 MIN PRN
Status: DISCONTINUED | OUTPATIENT
Start: 2020-06-11 | End: 2020-06-11 | Stop reason: HOSPADM

## 2020-06-11 RX ORDER — OXYMETAZOLINE HCL 0.05 %
SPRAY, NON-AEROSOL (ML) NASAL
Status: DISCONTINUED | OUTPATIENT
Start: 2020-06-11 | End: 2020-06-11 | Stop reason: HOSPADM

## 2020-06-11 RX ORDER — SUCCINYLCHOLINE CHLORIDE 20 MG/ML
INJECTION INTRAMUSCULAR; INTRAVENOUS
Status: DISCONTINUED | OUTPATIENT
Start: 2020-06-11 | End: 2020-06-11

## 2020-06-11 RX ORDER — ONDANSETRON 2 MG/ML
INJECTION INTRAMUSCULAR; INTRAVENOUS
Status: DISCONTINUED | OUTPATIENT
Start: 2020-06-11 | End: 2020-06-11

## 2020-06-11 RX ORDER — LIDOCAINE HYDROCHLORIDE 20 MG/ML
INJECTION INTRAVENOUS
Status: DISCONTINUED | OUTPATIENT
Start: 2020-06-11 | End: 2020-06-11

## 2020-06-11 RX ADMIN — Medication 100 MG: at 09:06

## 2020-06-11 RX ADMIN — HYDROMORPHONE HYDROCHLORIDE 0.2 MG: 2 INJECTION, SOLUTION INTRAMUSCULAR; INTRAVENOUS; SUBCUTANEOUS at 10:06

## 2020-06-11 RX ADMIN — SODIUM CHLORIDE, SODIUM LACTATE, POTASSIUM CHLORIDE, AND CALCIUM CHLORIDE: .6; .31; .03; .02 INJECTION, SOLUTION INTRAVENOUS at 09:06

## 2020-06-11 RX ADMIN — ROCURONIUM BROMIDE 20 MG: 10 INJECTION, SOLUTION INTRAVENOUS at 09:06

## 2020-06-11 RX ADMIN — MIDAZOLAM HYDROCHLORIDE 2 MG: 1 INJECTION, SOLUTION INTRAMUSCULAR; INTRAVENOUS at 09:06

## 2020-06-11 RX ADMIN — ONDANSETRON 4 MG: 2 INJECTION, SOLUTION INTRAMUSCULAR; INTRAVENOUS at 09:06

## 2020-06-11 RX ADMIN — FENTANYL CITRATE 100 MCG: 50 INJECTION INTRAMUSCULAR; INTRAVENOUS at 09:06

## 2020-06-11 RX ADMIN — ACETAMINOPHEN 1000 MG: 10 INJECTION, SOLUTION INTRAVENOUS at 10:06

## 2020-06-11 RX ADMIN — SUCCINYLCHOLINE CHLORIDE 120 MG: 20 INJECTION, SOLUTION INTRAMUSCULAR; INTRAVENOUS at 09:06

## 2020-06-11 RX ADMIN — SODIUM CHLORIDE, SODIUM LACTATE, POTASSIUM CHLORIDE, AND CALCIUM CHLORIDE: .6; .31; .03; .02 INJECTION, SOLUTION INTRAVENOUS at 08:06

## 2020-06-11 RX ADMIN — GLYCOPYRROLATE 0.4 MG: 0.2 INJECTION, SOLUTION INTRAMUSCULAR; INTRAVENOUS at 09:06

## 2020-06-11 RX ADMIN — PROPOFOL 150 MG: 10 INJECTION, EMULSION INTRAVENOUS at 09:06

## 2020-06-11 RX ADMIN — NEOSTIGMINE METHYLSULFATE 3 MG: 1 INJECTION INTRAVENOUS at 09:06

## 2020-06-11 NOTE — DISCHARGE INSTRUCTIONS
Ok for regular diet as tolerated. Avoid spicy food  Ok to talk, do not whisper or yell for 48 hours  Ok to restart blood thinner medication (xarelto)  Tomorrow 6/12/2020  Can take tylenol as needed for pain     Fall Prevention  Millions of people fall every year and injure themselves. You may have had anesthesia or sedation which may increase your risk of falling. You may have health issues that put you at an increased risk of falling.     Here are ways to reduce your risk of falling.  ·   · Make your home safe by keeping walkways clear of objects you may trip over.  · Use non-slip pads under rugs. Do not use area rugs or small throw rugs.  · Use non-slip mats in bathtubs and showers.  · Install handrails and lights on staircases.  · Do not walk in poorly lit areas.  · Do not stand on chairs or wobbly ladders.  · Use caution when reaching overhead or looking upward. This position can cause a loss of balance.  · Be sure your shoes fit properly, have non-slip bottoms and are in good condition.   · Wear shoes both inside and out. Avoid going barefoot or wearing slippers.  · Be cautious when going up and down stairs, curbs, and when walking on uneven sidewalks.  · If your balance is poor, consider using a cane or walker.  · If your fall was related to alcohol use, stop or limit alcohol intake.   · If your fall was related to use of sleeping medicines, talk to your doctor about this. You may need to reduce your dosage at bedtime if you awaken during the night to go to the bathroom.    · To reduce the need for nighttime bathroom trips:  ¨ Avoid drinking fluids for several hours before going to bed  ¨ Empty your bladder before going to bed  ¨ Men can keep a urinal at the bedside  · Stay as active as you can. Balance, flexibility, strength, and endurance all come from exercise. They all play a role in preventing falls. Ask your healthcare provider which types of activity are right for you.  · Get your vision checked on a  regular basis.  · If you have pets, know where they are before you stand up or walk so you don't trip over them.  Use night lights.

## 2020-06-11 NOTE — TRANSFER OF CARE
"Anesthesia Transfer of Care Note    Patient: Rachel Silver    Procedure(s) Performed: Procedure(s) (LRB):  LARYNGOSCOPY (N/A)    Patient location: PACU    Anesthesia Type: general    Transport from OR: Transported from OR on room air with adequate spontaneous ventilation    Post pain: adequate analgesia    Post assessment: no apparent anesthetic complications and tolerated procedure well    Post vital signs: stable    Level of consciousness: awake, alert and oriented    Nausea/Vomiting: no nausea/vomiting    Complications: none    Transfer of care protocol was followed      Last vitals:   Visit Vitals  BP (!) 157/69 (BP Location: Right arm, Patient Position: Lying)   Pulse 93   Temp 36.7 °C (98.1 °F) (Oral)   Resp 14   Ht 5' 5" (1.651 m)   Wt 76.5 kg (168 lb 10.4 oz)   SpO2 98%   Breastfeeding? No   BMI 28.07 kg/m²     "

## 2020-06-11 NOTE — INTERVAL H&P NOTE
The patient has been examined and the H&P has been reviewed:    I concur with the findings and no changes have occurred since H&P was written.    Anesthesia/Surgery risks, benefits and alternative options discussed and understood by patient/family.          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

## 2020-06-11 NOTE — OP NOTE
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 Right vocal cord normal    Operative report in detail: The patient was identified in the holding area per routine. She was taken to the operating room and placed supine on the operating table. The patient was intubated transorally by the anesthesiology service, and an adequate level of general endotracheal anesthesia was achieved. The head of the patient's bed was rotated 90 degrees away from anesthesia. her eyes were protected with tape, and a maxillary tooth guard  was placed. The patient was draped in the standard fashion for laryngoscopy, and a timeout was performed and the correct patient and procedure were identified.    The Dedo laryngoscope was inserted in the patient's oral cavity and advanced to visualize the posterior oropharynx, hypopharynx, and endolarynx, with the above findings noted. The laryngoscope was then seated in the larynx for optimal view and suspended from the [place] stand with a Lewy arm.  Visualization was optimized with the 0 degree telescope. Injection of 1.5 % lidocaine with 1:100,000 epi about 1 cc was injected into the vocal fold adjacent to the ventricle.     Biopsy was obtained of the left vocal fold lesion  using a small grasper and microscissors to removed the nodular portion of the lesion with care. The specimen was then placed on tel and sent for pathologic analysis. Hemostasis was accomplished with topical application of Afrin-soaked pledgets. Topical 2%  lidocaine was applied to the vocal folds to prevent laryngospasm, with excess suctioned from the field. The scope and tooth protector were then removed, and the procedure terminated. The patient was handed back over to anesthesia and extubated without complication.     Complications: None    Disposition: to recovery       Estimated Blood Loss (EBL): minimal  Specimens: left vocal cord lesion              Condition: Good    Disposition: PACU - hemodynamically stable.    Attestation: I performed the procedure.

## 2020-06-11 NOTE — ANESTHESIA PREPROCEDURE EVALUATION
06/11/2020  Rachel Silver is a 74 y.o., female.    Anesthesia Evaluation     I have reviewed the Nursing Notes.       Review of Systems  Anesthesia Hx:  No problems with previous Anesthesia   Social:  Former Smoker    EENT/Dental:   Hoarseness secondary to vocal cord lesion   Cardiovascular:   Exercise tolerance: good Denies Pacemaker. Hypertension  Denies Valvular problems/Murmurs.  Denies MI.  Denies CAD.    Denies CABG/stent. Dysrhythmias atrial fibrillation  Denies Angina.             denies PVD hyperlipidemia ECG has been reviewed.    Pulmonary:  Pulmonary Normal    Renal/:  Renal/ Normal     Hepatic/GI:  Hepatic/GI Normal    Neurological:  Neurology Normal    Endocrine:  Endocrine Normal        Physical Exam  General:  Well nourished    Airway/Jaw/Neck:  AIRWAY FINDINGS: Normal      Chest/Lungs:  Chest/Lungs Clear    Heart/Vascular:  Heart Findings: Normal       Mental Status:  Mental Status Findings: Normal        Anesthesia Plan  Type of Anesthesia, risks & benefits discussed:  Anesthesia Type:  general  Patient's Preference:   Intra-op Monitoring Plan: standard ASA monitors  Intra-op Monitoring Plan Comments:   Post Op Pain Control Plan:   Post Op Pain Control Plan Comments:   Induction:   IV  Beta Blocker:  Patient is not currently on a Beta-Blocker (No further documentation required).       Informed Consent: Patient understands risks and agrees with Anesthesia plan.  Questions answered. Anesthesia consent signed with patient.  ASA Score: 2     Day of Surgery Review of History & Physical:  There are no significant changes.  H&P update referred to the provider.         Ready For Surgery From Anesthesia Perspective.

## 2020-06-11 NOTE — BRIEF OP NOTE
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

## 2020-06-16 ENCOUNTER — PATIENT MESSAGE (OUTPATIENT)
Dept: OTOLARYNGOLOGY | Facility: CLINIC | Age: 75
End: 2020-06-16

## 2020-06-16 NOTE — DISCHARGE SUMMARY
[facility]  Discharge Note  Same day surgery    Procedure(s) (LRB):  LARYNGOSCOPY (N/A)    OUTCOME: Patient tolerated treatment/procedure well without complication and is now ready for discharge.    DISPOSITION: Home or Self Care    FINAL DIAGNOSIS:  Vocal cord lesion     FOLLOWUP: In clinic    DISCHARGE INSTRUCTIONS:    Discharge Procedure Orders   Diet Adult Regular     Notify your health care provider if you experience any of the following:  severe uncontrolled pain     Notify your health care provider if you experience any of the following:  difficulty breathing or increased cough     Activity as tolerated        Clinical Reference Documents Added to Patient Instructions       Document    LARYNGOSCOPY, DIRECT (ENGLISH)

## 2020-06-17 LAB
FINAL PATHOLOGIC DIAGNOSIS: NORMAL
GROSS: NORMAL
MICROSCOPIC EXAM: NORMAL
SUPPLEMENTAL DIAGNOSIS: NORMAL

## 2020-06-18 NOTE — ANESTHESIA POSTPROCEDURE EVALUATION
Anesthesia Post Evaluation    Patient: Rachel Silver    Procedure(s) Performed: Procedure(s) (LRB):  LARYNGOSCOPY (N/A)    Final Anesthesia Type: general    Patient location during evaluation: PACU  Patient participation: Yes- Able to Participate  Level of consciousness: awake and alert  Post-procedure vital signs: reviewed and stable  Pain management: adequate  Airway patency: patent    PONV status at discharge: No PONV  Anesthetic complications: no      Cardiovascular status: blood pressure returned to baseline and hemodynamically stable  Respiratory status: unassisted and spontaneous ventilation  Hydration status: euvolemic  Follow-up not needed.          Vitals Value Taken Time   /63 06/11/20 1107   Temp 36.6 °C (97.8 °F) 06/11/20 1107   Pulse 54 06/11/20 1107   Resp 16 06/11/20 1107   SpO2 98 % 06/11/20 1107         Event Time   Out of Recovery 11:04:16         Pain/Larry Score: No data recorded

## 2020-06-19 ENCOUNTER — OFFICE VISIT (OUTPATIENT)
Dept: OTOLARYNGOLOGY | Facility: CLINIC | Age: 75
End: 2020-06-19
Payer: MEDICARE

## 2020-06-19 VITALS
BODY MASS INDEX: 27.99 KG/M2 | SYSTOLIC BLOOD PRESSURE: 138 MMHG | HEIGHT: 65 IN | TEMPERATURE: 99 F | DIASTOLIC BLOOD PRESSURE: 90 MMHG | WEIGHT: 168 LBS

## 2020-06-19 DIAGNOSIS — D02.0 SQUAMOUS CELL CARCINOMA IN SITU (SCCIS) OF TRUE VOCAL CORD: Primary | ICD-10-CM

## 2020-06-19 PROCEDURE — 99024 PR POST-OP FOLLOW-UP VISIT: ICD-10-PCS | Mod: S$GLB,,, | Performed by: OTOLARYNGOLOGY

## 2020-06-19 PROCEDURE — 99024 POSTOP FOLLOW-UP VISIT: CPT | Mod: S$GLB,,, | Performed by: OTOLARYNGOLOGY

## 2020-06-28 NOTE — PROGRESS NOTES
"  ENT POSTOP CLINIC NOTE    Subjective: Pt doing well, voice is not as hoarse as after procedure.     Exam:   Trachea midline  No LAD  Voice slightly raspy  No stridor/no stertor    Path results:   "-Severe dysplasia/carcinoma in situ with slight keratinization   -Prominent chronic inflammation   -The possibility of invasion, particularly microinvasion, cannot be   completely excluded due to a combination of tangentiality of sectioning and inflammatory changes at the junction of dysplastic epithelium and subepithelial tissues.  Correlate with clinical findings and consider submission of additional specimens if clinically indicated. "    A/P:severe dysplasia/ CIS vocal fold  Discussed with patient at length about meaning of path results. We discussed treatment options of monitoring, radiation or endoscopic surgery. I discussed with her pros and cons of treatments and offered apptmt with rad onc for second opinion. I think given the location of the lesion being only on the vocal fold would recommend endoscopic excision to save radiation for later if needed. She is aware that voice can worsen after resection. She understands this may recur and she also is aware that repeat resection may show that all of the abnormal tissue was actually removed at initial biopsy. She also understands that repeat resection may be needed however I will perform vocal cord stripping to a good depth . She agrees that she would prefer to have hoarse voice if larger resection needed to help remove lesion completely .  Discussed that may use a co2 laser for the procedure   Discussed risks/benefits indications and all questions answered.   Will schedule procedure for next available date (7-9-20)     She had labwork within 30 days from last procedure. She also had recent cardiac clearance from prior biopsy. Will follow those recs for upcoming procedure    "

## 2020-07-02 ENCOUNTER — HOSPITAL ENCOUNTER (OUTPATIENT)
Dept: PREADMISSION TESTING | Facility: HOSPITAL | Age: 75
Discharge: HOME | End: 2020-07-02
Attending: OTOLARYNGOLOGY
Payer: MEDICARE

## 2020-07-02 VITALS
WEIGHT: 166.44 LBS | HEART RATE: 80 BPM | SYSTOLIC BLOOD PRESSURE: 133 MMHG | BODY MASS INDEX: 27.73 KG/M2 | HEIGHT: 65 IN | OXYGEN SATURATION: 97 % | TEMPERATURE: 98 F | DIASTOLIC BLOOD PRESSURE: 80 MMHG | RESPIRATION RATE: 18 BRPM

## 2020-07-02 NOTE — PLAN OF CARE
Pre-operative instructions, medication directives and pain scales reviewed with patient. All questions the patient had  were answered. Re-assurance about surgical procedure and day of surgery routine given as needed. The patient verbalized understanding of the pre-op instructions.Since patient had surgery less than a month ago- no changes in physical condition and no changes in medications reported.

## 2020-07-02 NOTE — DISCHARGE INSTRUCTIONS
Your procedure  is scheduled for _Thursday 7/9_________.    Call [phone] between 2pm and 5pm on __WED 7/8_____to find out your arrival time for the day of surgery.    Report to the Emergency Department on the day of your surgery.  You will be escorted to the admitting unit.--IF NOT LET SOMEONE KNOW    Important instructions:   Do not eat or drink after 12 midnight, including water.  It is okay to brush your teeth.  Do not have gum, candy or mints.     Take only these medications with a small swallow of water on the morning of your surgery DILTIAZEM, FLECANIDE______________        Stop taking Aspirin, Ibuprofen, Motrin and Aleve , Fish oil, and Vitamin E for at least 7 days before your surgery. You may use Tylenol unless otherwise instructed by your doctor.     LAST DOSE OF XARELTO 7/6         Return to patient registration through the Emergency Room as previously on_TUESDAY 7/7_________ for  Covid test.       Prep instructions:  ENEMA   SHOWER   OTHER_____________     Please shower the night before and the morning of your surgery.      .            Do not wear make- up, including mascara.     You may wear deodorant only.      Do not wear powder, body lotion or perfume/cologne.     Do not wear any jewelry or have any metal on your body.     You will be asked to remove any dentures or partials for the procedure.          If you are going home on the same day of surgery, you must arrange for a family member or a friend to drive you home.  Public transportation is prohibited.  You will not be able to drive home if you were given anesthesia or sedation.   .     .     Please leave money and valuables home.       You may bring your cell phone.     Call the doctor if fever or illness should occur before your surgery.    Call [phone] to contact us here if needed.

## 2020-07-06 ENCOUNTER — ANESTHESIA EVENT (OUTPATIENT)
Dept: SURGERY | Facility: HOSPITAL | Age: 75
End: 2020-07-06
Payer: MEDICARE

## 2020-07-07 ENCOUNTER — HOSPITAL ENCOUNTER (OUTPATIENT)
Dept: PREADMISSION TESTING | Facility: HOSPITAL | Age: 75
Discharge: HOME | End: 2020-07-07
Attending: OTOLARYNGOLOGY
Payer: MEDICARE

## 2020-07-07 DIAGNOSIS — J38.3 LESION OF TRUE VOCAL CORD: ICD-10-CM

## 2020-07-07 LAB
ANION GAP SERPL CALC-SCNC: 6 MMOL/L (ref 8–16)
BUN SERPL-MCNC: 15 MG/DL (ref 8–23)
CALCIUM SERPL-MCNC: 9.5 MG/DL (ref 8.7–10.5)
CHLORIDE SERPL-SCNC: 104 MMOL/L (ref 95–110)
CO2 SERPL-SCNC: 30 MMOL/L (ref 23–29)
CREAT SERPL-MCNC: 0.9 MG/DL (ref 0.5–1.4)
EST. GFR  (AFRICAN AMERICAN): >60 ML/MIN/1.73 M^2
EST. GFR  (NON AFRICAN AMERICAN): >60 ML/MIN/1.73 M^2
GLUCOSE SERPL-MCNC: 99 MG/DL (ref 70–110)
POTASSIUM SERPL-SCNC: 3.9 MMOL/L (ref 3.5–5.1)
SARS-COV-2 RDRP RESP QL NAA+PROBE: NEGATIVE
SODIUM SERPL-SCNC: 140 MMOL/L (ref 136–145)

## 2020-07-07 PROCEDURE — 80048 BASIC METABOLIC PNL TOTAL CA: CPT

## 2020-07-07 PROCEDURE — 36415 COLL VENOUS BLD VENIPUNCTURE: CPT

## 2020-07-07 PROCEDURE — U0002 COVID-19 LAB TEST NON-CDC: HCPCS

## 2020-07-09 ENCOUNTER — HOSPITAL ENCOUNTER (OUTPATIENT)
Facility: HOSPITAL | Age: 75
Discharge: HOME | End: 2020-07-09
Attending: OTOLARYNGOLOGY | Admitting: OTOLARYNGOLOGY
Payer: MEDICARE

## 2020-07-09 ENCOUNTER — ANESTHESIA (OUTPATIENT)
Dept: SURGERY | Facility: HOSPITAL | Age: 75
End: 2020-07-09
Payer: MEDICARE

## 2020-07-09 VITALS
TEMPERATURE: 98 F | SYSTOLIC BLOOD PRESSURE: 153 MMHG | DIASTOLIC BLOOD PRESSURE: 70 MMHG | OXYGEN SATURATION: 95 % | HEART RATE: 60 BPM | WEIGHT: 166.44 LBS | RESPIRATION RATE: 16 BRPM | BODY MASS INDEX: 27.7 KG/M2

## 2020-07-09 DIAGNOSIS — J38.3 LESION OF TRUE VOCAL CORD: Primary | ICD-10-CM

## 2020-07-09 PROCEDURE — 88305 TISSUE EXAM BY PATHOLOGIST: CPT | Mod: 26,,, | Performed by: PATHOLOGY

## 2020-07-09 PROCEDURE — 31541 PR LARYNGOSCOPY,DIRCT,OP SCOP,EXC TUMR: ICD-10-PCS | Mod: ,,, | Performed by: OTOLARYNGOLOGY

## 2020-07-09 PROCEDURE — 36000709 HC OR TIME LEV III EA ADD 15 MIN: Performed by: OTOLARYNGOLOGY

## 2020-07-09 PROCEDURE — D9220A PRA ANESTHESIA: Mod: ANES,,, | Performed by: ANESTHESIOLOGY

## 2020-07-09 PROCEDURE — 71000033 HC RECOVERY, INTIAL HOUR: Performed by: OTOLARYNGOLOGY

## 2020-07-09 PROCEDURE — D9220A PRA ANESTHESIA: Mod: CRNA,,, | Performed by: NURSE ANESTHETIST, CERTIFIED REGISTERED

## 2020-07-09 PROCEDURE — 37000008 HC ANESTHESIA 1ST 15 MINUTES: Performed by: OTOLARYNGOLOGY

## 2020-07-09 PROCEDURE — 25000003 PHARM REV CODE 250: Performed by: ANESTHESIOLOGY

## 2020-07-09 PROCEDURE — 88305 TISSUE EXAM BY PATHOLOGIST: ICD-10-PCS | Mod: 26,,, | Performed by: PATHOLOGY

## 2020-07-09 PROCEDURE — D9220A PRA ANESTHESIA: ICD-10-PCS | Mod: CRNA,,, | Performed by: NURSE ANESTHETIST, CERTIFIED REGISTERED

## 2020-07-09 PROCEDURE — 88305 TISSUE EXAM BY PATHOLOGIST: CPT | Performed by: PATHOLOGY

## 2020-07-09 PROCEDURE — 63600175 PHARM REV CODE 636 W HCPCS: Performed by: ANESTHESIOLOGY

## 2020-07-09 PROCEDURE — 25000003 PHARM REV CODE 250: Performed by: OTOLARYNGOLOGY

## 2020-07-09 PROCEDURE — 25000003 PHARM REV CODE 250: Performed by: NURSE ANESTHETIST, CERTIFIED REGISTERED

## 2020-07-09 PROCEDURE — 31541 LARYNSCOP W/TUMR EXC + SCOPE: CPT | Mod: ,,, | Performed by: OTOLARYNGOLOGY

## 2020-07-09 PROCEDURE — 37000009 HC ANESTHESIA EA ADD 15 MINS: Performed by: OTOLARYNGOLOGY

## 2020-07-09 PROCEDURE — D9220A PRA ANESTHESIA: ICD-10-PCS | Mod: ANES,,, | Performed by: ANESTHESIOLOGY

## 2020-07-09 PROCEDURE — 71000015 HC POSTOP RECOV 1ST HR: Performed by: OTOLARYNGOLOGY

## 2020-07-09 PROCEDURE — 63600175 PHARM REV CODE 636 W HCPCS: Performed by: NURSE ANESTHETIST, CERTIFIED REGISTERED

## 2020-07-09 PROCEDURE — 36000708 HC OR TIME LEV III 1ST 15 MIN: Performed by: OTOLARYNGOLOGY

## 2020-07-09 RX ORDER — SODIUM CHLORIDE, SODIUM LACTATE, POTASSIUM CHLORIDE, CALCIUM CHLORIDE 600; 310; 30; 20 MG/100ML; MG/100ML; MG/100ML; MG/100ML
INJECTION, SOLUTION INTRAVENOUS CONTINUOUS PRN
Status: DISCONTINUED | OUTPATIENT
Start: 2020-07-09 | End: 2020-07-09

## 2020-07-09 RX ORDER — HYDROCODONE BITARTRATE AND ACETAMINOPHEN 5; 325 MG/1; MG/1
1 TABLET ORAL EVERY 4 HOURS PRN
Status: DISCONTINUED | OUTPATIENT
Start: 2020-07-09 | End: 2020-07-09 | Stop reason: HOSPADM

## 2020-07-09 RX ORDER — OXYMETAZOLINE HCL 0.05 %
SPRAY, NON-AEROSOL (ML) NASAL
Status: DISCONTINUED | OUTPATIENT
Start: 2020-07-09 | End: 2020-07-09 | Stop reason: HOSPADM

## 2020-07-09 RX ORDER — NEOSTIGMINE METHYLSULFATE 1 MG/ML
INJECTION, SOLUTION INTRAVENOUS
Status: DISCONTINUED | OUTPATIENT
Start: 2020-07-09 | End: 2020-07-09

## 2020-07-09 RX ORDER — MIDAZOLAM HYDROCHLORIDE 1 MG/ML
INJECTION, SOLUTION INTRAMUSCULAR; INTRAVENOUS
Status: DISCONTINUED | OUTPATIENT
Start: 2020-07-09 | End: 2020-07-09

## 2020-07-09 RX ORDER — ACETAMINOPHEN 10 MG/ML
1000 INJECTION, SOLUTION INTRAVENOUS ONCE
Status: COMPLETED | OUTPATIENT
Start: 2020-07-09 | End: 2020-07-09

## 2020-07-09 RX ORDER — SUCCINYLCHOLINE CHLORIDE 20 MG/ML
INJECTION INTRAMUSCULAR; INTRAVENOUS
Status: DISCONTINUED | OUTPATIENT
Start: 2020-07-09 | End: 2020-07-09

## 2020-07-09 RX ORDER — FAMOTIDINE 20 MG/1
20 TABLET, FILM COATED ORAL 2 TIMES DAILY
Qty: 28 TABLET | Refills: 0 | Status: SHIPPED | OUTPATIENT
Start: 2020-07-09 | End: 2021-03-22

## 2020-07-09 RX ORDER — LIDOCAINE HYDROCHLORIDE 20 MG/ML
INJECTION INTRAVENOUS
Status: DISCONTINUED | OUTPATIENT
Start: 2020-07-09 | End: 2020-07-09

## 2020-07-09 RX ORDER — DEXAMETHASONE SODIUM PHOSPHATE 4 MG/ML
INJECTION, SOLUTION INTRA-ARTICULAR; INTRALESIONAL; INTRAMUSCULAR; INTRAVENOUS; SOFT TISSUE
Status: DISCONTINUED | OUTPATIENT
Start: 2020-07-09 | End: 2020-07-09

## 2020-07-09 RX ORDER — FENTANYL CITRATE 50 UG/ML
INJECTION, SOLUTION INTRAMUSCULAR; INTRAVENOUS
Status: DISCONTINUED | OUTPATIENT
Start: 2020-07-09 | End: 2020-07-09

## 2020-07-09 RX ORDER — PROPOFOL 10 MG/ML
VIAL (ML) INTRAVENOUS
Status: DISCONTINUED | OUTPATIENT
Start: 2020-07-09 | End: 2020-07-09

## 2020-07-09 RX ORDER — HYDROCODONE BITARTRATE AND ACETAMINOPHEN 5; 325 MG/1; MG/1
1 TABLET ORAL EVERY 6 HOURS PRN
Qty: 15 TABLET | Refills: 0 | Status: SHIPPED | OUTPATIENT
Start: 2020-07-09 | End: 2021-03-22

## 2020-07-09 RX ORDER — LIDOCAINE HYDROCHLORIDE 10 MG/ML
1 INJECTION, SOLUTION EPIDURAL; INFILTRATION; INTRACAUDAL; PERINEURAL ONCE
Status: DISCONTINUED | OUTPATIENT
Start: 2020-07-09 | End: 2020-07-09 | Stop reason: HOSPADM

## 2020-07-09 RX ORDER — ROCURONIUM BROMIDE 10 MG/ML
INJECTION, SOLUTION INTRAVENOUS
Status: DISCONTINUED | OUTPATIENT
Start: 2020-07-09 | End: 2020-07-09

## 2020-07-09 RX ORDER — GLYCOPYRROLATE 0.2 MG/ML
INJECTION INTRAMUSCULAR; INTRAVENOUS
Status: DISCONTINUED | OUTPATIENT
Start: 2020-07-09 | End: 2020-07-09

## 2020-07-09 RX ORDER — ONDANSETRON 2 MG/ML
INJECTION INTRAMUSCULAR; INTRAVENOUS
Status: DISCONTINUED | OUTPATIENT
Start: 2020-07-09 | End: 2020-07-09

## 2020-07-09 RX ORDER — SODIUM CHLORIDE 0.9 % (FLUSH) 0.9 %
10 SYRINGE (ML) INJECTION
Status: DISCONTINUED | OUTPATIENT
Start: 2020-07-09 | End: 2020-07-09 | Stop reason: HOSPADM

## 2020-07-09 RX ORDER — ONDANSETRON 2 MG/ML
4 INJECTION INTRAMUSCULAR; INTRAVENOUS DAILY PRN
Status: DISCONTINUED | OUTPATIENT
Start: 2020-07-09 | End: 2020-07-09 | Stop reason: HOSPADM

## 2020-07-09 RX ORDER — HYDROMORPHONE HYDROCHLORIDE 2 MG/ML
0.2 INJECTION, SOLUTION INTRAMUSCULAR; INTRAVENOUS; SUBCUTANEOUS EVERY 5 MIN PRN
Status: DISCONTINUED | OUTPATIENT
Start: 2020-07-09 | End: 2020-07-09 | Stop reason: HOSPADM

## 2020-07-09 RX ORDER — ESMOLOL HYDROCHLORIDE 10 MG/ML
INJECTION INTRAVENOUS
Status: DISCONTINUED | OUTPATIENT
Start: 2020-07-09 | End: 2020-07-09

## 2020-07-09 RX ORDER — LIDOCAINE HYDROCHLORIDE 40 MG/ML
INJECTION, SOLUTION RETROBULBAR
Status: DISCONTINUED | OUTPATIENT
Start: 2020-07-09 | End: 2020-07-09 | Stop reason: HOSPADM

## 2020-07-09 RX ORDER — SODIUM CHLORIDE 9 MG/ML
INJECTION, SOLUTION INTRAVENOUS CONTINUOUS
Status: DISCONTINUED | OUTPATIENT
Start: 2020-07-09 | End: 2020-07-09 | Stop reason: HOSPADM

## 2020-07-09 RX ADMIN — PROPOFOL 30 MG: 10 INJECTION, EMULSION INTRAVENOUS at 10:07

## 2020-07-09 RX ADMIN — ESMOLOL HYDROCHLORIDE 10 MG: 10 INJECTION, SOLUTION INTRAVENOUS at 10:07

## 2020-07-09 RX ADMIN — GLYCOPYRROLATE 0.6 MG: 0.2 INJECTION, SOLUTION INTRAMUSCULAR; INTRAVENOUS at 10:07

## 2020-07-09 RX ADMIN — PROPOFOL 20 MG: 10 INJECTION, EMULSION INTRAVENOUS at 09:07

## 2020-07-09 RX ADMIN — HYDROMORPHONE HYDROCHLORIDE 0.2 MG: 2 INJECTION, SOLUTION INTRAMUSCULAR; INTRAVENOUS; SUBCUTANEOUS at 11:07

## 2020-07-09 RX ADMIN — SUCCINYLCHOLINE CHLORIDE 100 MG: 20 INJECTION, SOLUTION INTRAMUSCULAR; INTRAVENOUS at 09:07

## 2020-07-09 RX ADMIN — SODIUM CHLORIDE: 0.9 INJECTION, SOLUTION INTRAVENOUS at 08:07

## 2020-07-09 RX ADMIN — ONDANSETRON 4 MG: 2 INJECTION, SOLUTION INTRAMUSCULAR; INTRAVENOUS at 09:07

## 2020-07-09 RX ADMIN — ROCURONIUM BROMIDE 30 MG: 10 INJECTION, SOLUTION INTRAVENOUS at 09:07

## 2020-07-09 RX ADMIN — ROCURONIUM BROMIDE 5 MG: 10 INJECTION, SOLUTION INTRAVENOUS at 10:07

## 2020-07-09 RX ADMIN — ACETAMINOPHEN 1000 MG: 10 INJECTION, SOLUTION INTRAVENOUS at 11:07

## 2020-07-09 RX ADMIN — GLYCOPYRROLATE 0.1 MG: 0.2 INJECTION, SOLUTION INTRAMUSCULAR; INTRAVENOUS at 09:07

## 2020-07-09 RX ADMIN — DEXAMETHASONE SODIUM PHOSPHATE 4 MG: 4 INJECTION, SOLUTION INTRAMUSCULAR; INTRAVENOUS at 09:07

## 2020-07-09 RX ADMIN — FENTANYL CITRATE 50 MCG: 50 INJECTION INTRAMUSCULAR; INTRAVENOUS at 09:07

## 2020-07-09 RX ADMIN — ESMOLOL HYDROCHLORIDE 10 MG: 10 INJECTION, SOLUTION INTRAVENOUS at 09:07

## 2020-07-09 RX ADMIN — NEOSTIGMINE METHYLSULFATE 4 MG: 1 INJECTION INTRAVENOUS at 10:07

## 2020-07-09 RX ADMIN — SODIUM CHLORIDE, SODIUM LACTATE, POTASSIUM CHLORIDE, AND CALCIUM CHLORIDE: .6; .31; .03; .02 INJECTION, SOLUTION INTRAVENOUS at 09:07

## 2020-07-09 RX ADMIN — MIDAZOLAM HYDROCHLORIDE 2 MG: 1 INJECTION, SOLUTION INTRAMUSCULAR; INTRAVENOUS at 09:07

## 2020-07-09 RX ADMIN — Medication 100 MG: at 09:07

## 2020-07-09 RX ADMIN — PROPOFOL 150 MG: 10 INJECTION, EMULSION INTRAVENOUS at 09:07

## 2020-07-09 NOTE — BRIEF OP NOTE
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

## 2020-07-09 NOTE — TRANSFER OF CARE
Anesthesia Transfer of Care Note    Patient: Rachel Silver    Procedure(s) Performed: Procedure(s):  LARYNGOSCOPY  EXCISION, LESION, VOCAL CORD    Patient location: PACU    Anesthesia Type: general    Transport from OR: Transported from OR on room air with adequate spontaneous ventilation    Post pain: adequate analgesia    Post assessment: no apparent anesthetic complications and tolerated procedure well    Post vital signs: stable    Level of consciousness: sedated and responds to stimulation    Nausea/Vomiting: no nausea/vomiting    Complications: none    Transfer of care protocol was followed      Last vitals:   Visit Vitals  BP (!) 146/70 (BP Location: Left arm, Patient Position: Lying)   Pulse 84   Temp 36.5 °C (97.7 °F) (Axillary)   Resp 10   Wt 75.5 kg (166 lb 7.2 oz)   SpO2 100%   Breastfeeding No   BMI 27.70 kg/m²

## 2020-07-09 NOTE — ANESTHESIA PREPROCEDURE EVALUATION
2020  Rachel Silver is a 74 y.o., female.  To undergo Procedure(s) (LRB):  LARYNGOSCOPY (N/A)     Denies CP/SOB/MI/CVA/URI symptoms.  Occasional GERD with certain foods  METS > 4  NPO > 8    Past Medical History:  Past Medical History:   Diagnosis Date    Atrial fibrillation     Cataracts, bilateral     Hoarse     Hypertension     Vaginal delivery     x3       Past Surgical History:  Past Surgical History:   Procedure Laterality Date    HYSTERECTOMY      LARYNGOSCOPY N/A 2020    Procedure: LARYNGOSCOPY;  Surgeon: Yesenia Durham MD;  Location: [facility];  Service: ENT;  Laterality: N/A;  RN PRE OP, COVID NEGATIVE-- 2020 [place]  HAS-Cardiac Clearance today  OM    Microlaryngoscopy with biopsy vocal cords      TONSILLECTOMY      torn meniscus         Social History:  Social History     Socioeconomic History    Marital status:      Spouse name: Not on file    Number of children: Not on file    Years of education: Not on file    Highest education level: Not on file   Occupational History    Not on file   Social Needs    Financial resource strain: Not on file    Food insecurity     Worry: Not on file     Inability: Not on file    Transportation needs     Medical: Not on file     Non-medical: Not on file   Tobacco Use    Smoking status: Former Smoker     Packs/day: 1.50     Quit date: 10/9/1987     Years since quittin.7    Smokeless tobacco: Never Used   Substance and Sexual Activity    Alcohol use: Yes     Comment: social    Drug use: Never    Sexual activity: Not Currently     Partners: Male   Lifestyle    Physical activity     Days per week: Not on file     Minutes per session: Not on file    Stress: Not on file   Relationships    Social connections     Talks on phone: Not on file     Gets together: Not on file     Attends Hindu service: Not on file      Active member of club or organization: Not on file     Attends meetings of clubs or organizations: Not on file     Relationship status: Not on file   Other Topics Concern    Not on file   Social History Narrative    Not on file       Medications:  No current facility-administered medications on file prior to encounter.      Current Outpatient Medications on File Prior to Encounter   Medication Sig Dispense Refill    diltiaZEM HCl (CARDIZEM LA) 240 mg 24 hr tablet Take 1 tablet (240 mg total) by mouth once daily. 90 tablet 3    flecainide (TAMBOCOR) 100 MG Tab Take 1 tablet (100 mg total) by mouth every 12 (twelve) hours. 180 tablet 3    hydroCHLOROthiazide (HYDRODIURIL) 25 MG tablet Take 1 tablet (25 mg total) by mouth once daily. 90 tablet 3    rosuvastatin (CRESTOR) 40 MG Tab Take 1 tablet (40 mg total) by mouth every evening. 90 tablet 3    CODEINE/BUTALBITAL/ASA/CAFFEIN (FIORINAL-CODEINE #3 ORAL) Take 1 capsule by mouth every 6 to 8 hours as needed.      fexofenadine (ALLEGRA) 60 MG tablet Take 1 tablet (60 mg total) by mouth 2 (two) times daily. Take 1 tablet twice a day as needed for allergy 30 tablet 11    rivaroxaban (XARELTO) 20 mg Tab Take 1 tablet (20 mg total) by mouth once daily. (Patient taking differently: Take 20 mg by mouth once daily. Last  To be 7/6 before surgery) 90 tablet 3       Allergies:  Review of patient's allergies indicates:  No Known Allergies    Active Problems:  Patient Active Problem List   Diagnosis    Hoarseness    Other chest pain    ST segment depression    T wave inversion in EKG    History of ASCVD    Essential hypertension    PAF (paroxysmal atrial fibrillation)    Pre-operative cardiovascular examination    HLD (hyperlipidemia)    Lesion of true vocal cord       Diagnostic Studies:  Results for JIM WALKER (MRN [id number]) as of 7/9/2020 08:39   Ref. Range 6/8/2020 09:40   WBC Latest Ref Range: 3.90 - 12.70 K/uL 7.37   RBC Latest Ref Range: 4.00 - 5.40  M/uL 4.43   Hemoglobin Latest Ref Range: 12.0 - 16.0 g/dL 13.1   Hematocrit Latest Ref Range: 37.0 - 48.5 % 37.7   MCV Latest Ref Range: 82 - 98 fL 85   MCH Latest Ref Range: 27.0 - 31.0 pg 29.6   MCHC Latest Ref Range: 32.0 - 36.0 g/dL 34.7   RDW Latest Ref Range: 11.5 - 14.5 % 12.8   Platelets Latest Ref Range: 150 - 350 K/uL 215   MPV Latest Ref Range: 9.2 - 12.9 fL 11.3   Gran% Latest Ref Range: 38.0 - 73.0 % 60.4   Gran # (ANC) Latest Ref Range: 1.8 - 7.7 K/uL 4.5   Lymph% Latest Ref Range: 18.0 - 48.0 % 27.0   Lymph # Latest Ref Range: 1.0 - 4.8 K/uL 2.0   Mono% Latest Ref Range: 4.0 - 15.0 % 9.8   Mono # Latest Ref Range: 0.3 - 1.0 K/uL 0.7   Eosinophil% Latest Ref Range: 0.0 - 8.0 % 1.6   Eos # Latest Ref Range: 0.0 - 0.5 K/uL 0.1   Basophil% Latest Ref Range: 0.0 - 1.9 % 0.9   Baso # Latest Ref Range: 0.00 - 0.20 K/uL 0.07   nRBC Latest Ref Range: 0 /100 WBC 0   Differential Method Unknown Automated   Immature Grans (Abs) Latest Ref Range: 0.00 - 0.04 K/uL 0.02   Immature Granulocytes Latest Ref Range: 0.0 - 0.5 % 0.3   Results for JIM WALKER (MRN [id number]) as of 7/9/2020 08:39   Ref. Range 7/7/2020 10:20   Sodium Latest Ref Range: 136 - 145 mmol/L 140   Potassium Latest Ref Range: 3.5 - 5.1 mmol/L 3.9   Chloride Latest Ref Range: 95 - 110 mmol/L 104   CO2 Latest Ref Range: 23 - 29 mmol/L 30 (H)   Anion Gap Latest Ref Range: 8 - 16 mmol/L 6 (L)   BUN, Bld Latest Ref Range: 8 - 23 mg/dL 15   Creatinine Latest Ref Range: 0.5 - 1.4 mg/dL 0.9   eGFR if non African American Latest Ref Range: >60 mL/min/1.73 m^2 >60   eGFR if  Latest Ref Range: >60 mL/min/1.73 m^2 >60   Glucose Latest Ref Range: 70 - 110 mg/dL 99   Calcium Latest Ref Range: 8.7 - 10.5 mg/dL 9.5   Results for JIM WALKER (MRN [id number]) as of 7/9/2020 08:39   Ref. Range 7/7/2020 10:16   SARS-CoV-2 RNA, Amplification, Qual Latest Ref Range: Negative  Negative     EKG (6/8/20):  Sinus bradycardia   Incomplete right bundle  branch block   ST and T wave abnormality, consider anterior ischemia     TTE (12/27/19):  · Normal left ventricular systolic function. The estimated ejection fraction is 60%  · Indeterminate left ventricular diastolic function.  · No wall motion abnormalities.  · Normal right ventricular systolic function.  · Mild left atrial enlargement.  · The estimated PA systolic pressure is 22 mm Hg  · Normal central venous pressure (3 mm Hg).    24 Hour Vitals:  Temp:  [36.8 °C (98.2 °F)] 36.8 °C (98.2 °F)  Pulse:  [66] 66  Resp:  [16] 16  SpO2:  [97 %] 97 %  BP: (134)/(66) 134/66   See Nursing Charting For Additional Vitals    Anesthesia Evaluation    I have reviewed the Patient Summary Reports.    I have reviewed the Nursing Notes.       Review of Systems  Anesthesia Hx:  No problems with previous Anesthesia   Denies Personal Hx of Anesthesia complications.   Social:  Former Smoker, Social Alcohol Use    EENT/Dental:   Vocal cord lesion   Cardiovascular:   Exercise tolerance: good Hypertension, well controlled hyperlipidemia ECG has been reviewed. H/O afib   Pulmonary:  Pulmonary Normal    Hepatic/GI:   GERD, well controlled        Physical Exam  General:  Well nourished    Airway/Jaw/Neck:   MP2, TMD > 3FB, loose top middle/right teeth and some chipped lower teeth    Dental:  Dental Findings:    Chest/Lungs:  Chest/Lungs Clear    Heart/Vascular:  Heart Findings: Normal            Anesthesia Plan  Type of Anesthesia, risks & benefits discussed:  Anesthesia Type:  general  Patient's Preference:   Intra-op Monitoring Plan: standard ASA monitors  Intra-op Monitoring Plan Comments:   Post Op Pain Control Plan: multimodal analgesia, IV/PO Opioids PRN and per primary service following discharge from PACU  Post Op Pain Control Plan Comments:   Induction:   IV  Beta Blocker:  Patient is not currently on a Beta-Blocker (No further documentation required).       Informed Consent: Patient understands risks and agrees with Anesthesia  plan.  Questions answered. Anesthesia consent signed with patient.  ASA Score: 3     Day of Surgery Review of History & Physical:  There are no significant changes.      Anesthesia Plan Notes: Laser tube        Ready For Surgery From Anesthesia Perspective.

## 2020-07-09 NOTE — DISCHARGE INSTRUCTIONS
Do not whisper or talk loudly/scream . Ok to talk in normal voice . Try to minimize talking if possible but if you do talk, do not whisper or scream. Continue this for 24-48 hours    Ok for regular diet, avoid spicy food for 1-2 weeks'    No driving, drinking alcohol, operating machinery or appliances, or participating in activities that require good judgement or balance, and no signing legal documents for the next 24 hours after anesthesia.  Do not drive while taking pain medicatio  ns.Fall Prevention  Millions of people fall every year and injure themselves. You may have had anesthesia or sedation which may increase your risk of falling. You may have health issues that put you at an increased risk of falling.     Here are ways to reduce your risk of falling.  ·   · Make your home safe by keeping walkways clear of objects you may trip over.  · Use non-slip pads under rugs. Do not use area rugs or small throw rugs.  · Use non-slip mats in bathtubs and showers.  · Install handrails and lights on staircases.  · Do not walk in poorly lit areas.  · Do not stand on chairs or wobbly ladders.  · Use caution when reaching overhead or looking upward. This position can cause a loss of balance.  · Be sure your shoes fit properly, have non-slip bottoms and are in good condition.   · Wear shoes both inside and out. Avoid going barefoot or wearing slippers.  · Be cautious when going up and down stairs, curbs, and when walking on uneven sidewalks.  · If your balance is poor, consider using a cane or walker.  · If your fall was related to alcohol use, stop or limit alcohol intake.   · If your fall was related to use of sleeping medicines, talk to your doctor about this. You may need to reduce your dosage at bedtime if you awaken during the night to go to the bathroom.    · To reduce the need for nighttime bathroom trips:  ¨ Avoid drinking fluids for several hours before going to bed  ¨ Empty your bladder before going to bed  ¨ Men  can keep a urinal at the bedside  · Stay as active as you can. Balance, flexibility, strength, and endurance all come from exercise. They all play a role in preventing falls. Ask your healthcare provider which types of activity are right for you.  · Get your vision checked on a regular basis.  · If you have pets, know where they are before you stand up or walk so you don't trip over them.  Use night lights.

## 2020-07-10 ENCOUNTER — TELEPHONE (OUTPATIENT)
Dept: OTOLARYNGOLOGY | Facility: CLINIC | Age: 75
End: 2020-07-10

## 2020-07-10 LAB
FINAL PATHOLOGIC DIAGNOSIS: NORMAL
GROSS: NORMAL
MICROSCOPIC EXAM: NORMAL

## 2020-07-10 NOTE — TELEPHONE ENCOUNTER
"----- Message from Safia Pepe sent at 7/10/2020  1:38 PM CDT -----  Regarding: Patient access  Name of Who is Calling:  Rachel Silver      What is the request in detail:   Patient called requesting a call. Patient states, "she had a procedure on yesterday 07/09/2020 and has questions."    please discuss and further advise."      Reply by [facility]: no      Call Back :  [phone]                                        "

## 2020-07-10 NOTE — ANESTHESIA POSTPROCEDURE EVALUATION
Anesthesia Post Evaluation    Patient: Rachel Silver    Procedure(s) Performed: Procedure(s) (LRB):  EXCISION, LESION, VOCAL CORD, USING LASER  MICROLARYNGOSCOPY (N/A)    Final Anesthesia Type: general    Patient location during evaluation: PACU  Patient participation: Yes- Able to Participate  Level of consciousness: awake and alert and oriented  Post-procedure vital signs: reviewed and stable  Pain management: adequate  Airway patency: patent    PONV status at discharge: No PONV  Anesthetic complications: yes  Perioperative Events: dental trauma      Magalis-operative Events Comments: Top right bridge removed during procedure. All teeth accounted for and no damage to bridge.  Cardiovascular status: hemodynamically stable and blood pressure returned to baseline  Respiratory status: spontaneous ventilation, room air and unassisted  Hydration status: euvolemic  Follow-up not needed.          Vitals Value Taken Time   /70 07/09/20 1144   Temp 36.4 °C (97.5 °F) 07/09/20 1144   Pulse 60 07/09/20 1144   Resp 16 07/09/20 1144   SpO2 95 % 07/09/20 1144         Event Time   Out of Recovery 11:42:00         Pain/Larry Score: Pain Rating Prior to Med Admin: 6 (7/9/2020 11:40 AM)  Larry Score: 10 (7/9/2020 11:44 AM)  Modified Larry Score: 20 (7/9/2020 12:20 PM)

## 2020-07-13 NOTE — OP NOTE
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 Upper dentition very loose prior and at conclusion of procedure    Operative report in detail: The patient was identified in the holding area per routine. She was taken to the operating room and placed supine on the operating table. The patient was intubated transorally by the anesthesiology service using a size 5 laser safety tube, and an adequate level of general endotracheal anesthesia was achieved. The head of the patient's bed was rotated 90 degrees away from anesthesia. Her eyes were protected with paper tape and wet gauze, and a maxillary tooth guard  was placed. The patient was draped in the standard fashion for laryngoscopy, and a timeout was performed and the correct patient and procedure were identified. Laser safety precautions were reviewed during time out , saline was on the field , all personnel wearing safety goggles, and saline soaked towels were around the patient. The omniguide laser was tested prior to procedure and in working condition.     The Denny laryngoscope was inserted in the patient's oral cavity and advanced to visualize the posterior oropharynx, hypopharynx, and endolarynx, with the above findings noted.  Visualization was optimized with the 0 degree telescope. Photodocumentation was obtained. The laryngoscope was then placed into suspension with a Lewy arm and the microscope was brought into the field. The left vocal cord was injected with lidociane. The  correct laser instrument and fiber was set up in sterile fashion. The laser was then used to excise the vocal cord lesion with a margin of normal tissue anteriorly and posteriorly at  A setting of 5 initially and then at a setting of 7 hernández. A cup forcep was used to provide countertraction . The laser was placed on setting when not in use.Once the excision was complete, it was passed off onto telfa and sent for permanent pathologic analysis. The CO2 laser was then used to vaporize the resection bed as well as obtain hemostasis. This concluded the procedure.    The scope and tooth protector were then removed, and the procedure terminated. The patient was handed back over to anesthesia and extubated without complication. I was present for the duration of the procedure and performed entire procedure.     Complications: None    Blood loss : minimal ( less than 5 cc)    Specimen: left vocal cord lesion    Disposition: to recovery

## 2020-07-13 NOTE — DISCHARGE SUMMARY
[facility]  Discharge Note  Short Stay    Procedure(s) (LRB):  EXCISION, LESION, VOCAL CORD, USING LASER  MICROLARYNGOSCOPY (N/A)    OUTCOME: Patient tolerated treatment/procedure well without complication and is now ready for discharge.    DISPOSITION: Home or Self Care    FINAL DIAGNOSIS:  <principal problem not specified>    FOLLOWUP: In clinic    DISCHARGE INSTRUCTIONS:    Discharge Procedure Orders   Diet general     Call MD for:  severe uncontrolled pain     Call MD for:   Order Comments: Difficulty breathing

## 2020-07-15 ENCOUNTER — OFFICE VISIT (OUTPATIENT)
Dept: OTOLARYNGOLOGY | Facility: CLINIC | Age: 75
End: 2020-07-15
Payer: MEDICARE

## 2020-07-15 VITALS
DIASTOLIC BLOOD PRESSURE: 70 MMHG | BODY MASS INDEX: 27.66 KG/M2 | WEIGHT: 166 LBS | SYSTOLIC BLOOD PRESSURE: 118 MMHG | HEIGHT: 65 IN | TEMPERATURE: 98 F

## 2020-07-15 DIAGNOSIS — D02.0 SQUAMOUS CELL CARCINOMA IN SITU (SCCIS) OF TRUE VOCAL CORD: Primary | ICD-10-CM

## 2020-07-15 DIAGNOSIS — D02.0 SQUAMOUS CELL CARCINOMA IN SITU (SCCIS) OF TRUE VOCAL CORD: ICD-10-CM

## 2020-07-15 DIAGNOSIS — R49.0 HOARSE: Primary | ICD-10-CM

## 2020-07-15 PROCEDURE — 31575 DIAGNOSTIC LARYNGOSCOPY: CPT | Mod: S$GLB,,, | Performed by: OTOLARYNGOLOGY

## 2020-07-15 PROCEDURE — 99024 PR POST-OP FOLLOW-UP VISIT: ICD-10-PCS | Mod: S$GLB,,, | Performed by: OTOLARYNGOLOGY

## 2020-07-15 PROCEDURE — 99024 POSTOP FOLLOW-UP VISIT: CPT | Mod: S$GLB,,, | Performed by: OTOLARYNGOLOGY

## 2020-07-15 PROCEDURE — 31575 PR LARYNGOSCOPY, FLEXIBLE; DIAGNOSTIC: ICD-10-PCS | Mod: S$GLB,,, | Performed by: OTOLARYNGOLOGY

## 2020-07-20 NOTE — PROGRESS NOTES
ENT POSTOP CLINIC NOTE    SUBJECTIVE:   pt s/p DL biopsy which was concerning for SCCa in situ, could not rule out invasive scca (6-11-20) who underwent microlaryngeal excision of vocal cord lesion with CO2 laser on 7-9-20 of her left true vocal fold. CO2 laser also used to vaporize resection b ed Pt doing well postop, voice still slightly hoarse. Pain well controlled.     EXAM:   NAD  Trachea midline  No LAD  Voice slightly raspy  No stridor/no stertor    PROCEDURE NOTE  NAME OF PROCEDURE: Flexible Laryngoscopy, diagnostic  INDICATIONS:postop laryngeal exam  FINDINGS: left vocal fold with well healing tissue , moderate edema     Consent: After procedure was explained in detail and all questions answered, verbal consent was obtained for performing flexible laryngoscopy.  Anesthesia: topical 4% lidocaine and neosynephrine  Procedure: With patient in seated position, the scope was inserted into the right nasal passageway and advanced atraumatically into the nasopharynx to examine the following structures  Nasopharynx: no mass or lesion noted in nasopharynx.   Oropharynx: base of tongue without  mass or ulceration. Lingual tonsils normal in appearance  Hypopharynx: posterior pharyngeal wall without mass or lesion. No pooling of secretions. Pyriform sinuses visible without mass or lesion  Larynx: epiglottis normal without lesion. False vocal folds without edema/erythema/lesion. True vocal folds mobile . Left vocal fold with moderate edema and healing response. No interarytenoid edema nor erythema . Postcricoid region without edema or lesion  Subglottis: visualized portion of subglottis normal in appearance    After examination performed, the scope was removed atraumatically . The patient tolerated the procedure well. Photodocumentation obtained with representative image(s) below, all images uploaded in media section of epic.        Path results:   -Moderate dysplasia   -Note that crush/cautery artifact, focal  epithelial denudation, and   tangentiality preclude a more extensive evaluation of affected areas (The epithelium appears well demarcated from underlying tissues with a distinct basement membrane.  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 She will need frequent monitoring with scope exams with low threshold for repeat exam under anesthesia. Scope exam today showed well healing tissue. I will also refer her to speech therapist for evaluation and treatment to help with persistent hoarseness. We again discussed today ( as previously before surgery) that hoarseness may be permanent. F/u 6-8 weeks for repeat scope exam , sooner if she develops any new symptoms/issues

## 2020-07-24 ENCOUNTER — CLINICAL SUPPORT (OUTPATIENT)
Dept: SPEECH THERAPY | Facility: HOSPITAL | Age: 75
End: 2020-07-24
Attending: OTOLARYNGOLOGY
Payer: MEDICARE

## 2020-07-24 DIAGNOSIS — R49.0 HOARSE: ICD-10-CM

## 2020-07-24 DIAGNOSIS — D02.0 SQUAMOUS CELL CARCINOMA IN SITU (SCCIS) OF TRUE VOCAL CORD: ICD-10-CM

## 2020-07-24 PROCEDURE — 92524 BEHAVRAL QUALIT ANALYS VOICE: CPT | Mod: GN

## 2020-07-24 NOTE — PROGRESS NOTES
Referring provider: Dr. Yesenia Durham  Reason for visit:  Behavioral and qualitative analysis of voice and resonance (CPT 68352)    Subjective / History    Rachel Silver is a 74 y.o. female referred for voice evaluation (CPT 69498) by Dr. Durham. She presents with complaints of hoarseness. She underwent microlaryngeal excision of vocal cord lesion with CO2 laser of left vocal fold w/ Dr. Durham on 20. She reports that her voice starts out pretty good in the morning, then becomes more hoarse as the day goes on. She reports that coughing and throat clearing cause her voice to become worse, and that sometimes, she has very little voice at all by the end of the day. She reports that she is an outgoing person and talks loud, especially when on the phone or with her sisters. Patient is retired. She worked as a .     Swallowing: no c/o  Breathing: reports constantly clearing her throat frequently    Smokin packs/day ; quit 35 years ago   Caffeine: 1-2 cups/day   Reflux: yes; feels stomach acid in her throat when eating certain foods, taking pepcid occasionally   Water: constantly drinking water throughout the day oz/day     Laryngoscopy findings (per Dr. Durham on 7/15/20)  - left vocal fold with well healing tissue, moderate edema     Past Medical History:   Diagnosis Date    Atrial fibrillation     Cataracts, bilateral     Hoarse     Hypertension     Vaginal delivery     x3     Current Outpatient Medications on File Prior to Visit   Medication Sig Dispense Refill    diltiaZEM HCl (CARDIZEM LA) 240 mg 24 hr tablet Take 1 tablet (240 mg total) by mouth once daily. 90 tablet 3    famotidine (PEPCID) 20 MG tablet Take 1 tablet (20 mg total) by mouth 2 (two) times daily. for 14 days 28 tablet 0    fexofenadine (ALLEGRA) 60 MG tablet Take 1 tablet (60 mg total) by mouth 2 (two) times daily. Take 1 tablet twice a day as needed for allergy 30 tablet 11    flecainide (TAMBOCOR) 100 MG  Tab Take 1 tablet (100 mg total) by mouth every 12 (twelve) hours. 180 tablet 3    hydroCHLOROthiazide (HYDRODIURIL) 25 MG tablet Take 1 tablet (25 mg total) by mouth once daily. 90 tablet 3    HYDROcodone-acetaminophen (NORCO) 5-325 mg per tablet Take 1 tablet by mouth every 6 (six) hours as needed for Pain (severe pain). 15 tablet 0    rivaroxaban (XARELTO) 20 mg Tab Take 1 tablet (20 mg total) by mouth once daily. 90 tablet 3    rosuvastatin (CRESTOR) 40 MG Tab Take 1 tablet (40 mg total) by mouth every evening. 90 tablet 3     Current Facility-Administered Medications on File Prior to Visit   Medication Dose Route Frequency Provider Last Rate Last Dose    sodium chloride 0.9% flush 10 mL  10 mL Intravenous PRN Yesenia Durham MD           Objective    Perceptual/behavioral assessment  -CAPE-V Overall Score: 38  -Quality: rough, breathy, strained   -Volume: appropriate for age and gender  -Pitch: slightly low F0  -Flexibility: diminished  -Habitual respiratory pattern: chest/clavicular    Education / Stimulability Trials   Discussed importance of vocal hygiene including: hydration, conservation, reducing caffeine/drying agents and reducing throat clearing, coughing, other phonotraumatic behaviors. Instructed pt to take sip of water or clear throat gently if she must do so. Pt reports clearing her throat forcefully throughout the day. Clinician discussed w/ patient that doing so will fatigue her voice more quickly. Patient was stimulable for improved voice using cup to mouth phonation and cup bubble exercises. Patient preferred cup to mouth phonation. Practiced with modal pitch /u/, pitch glides, and connected speech. Patient noted no strain when completing exercises, but did note strain in upper register with pitch glides. Clinician counseled patient that her voice will tire out more quickly when projecting, but slight projection with decreased strain was elicited via cup to mouth phonation. Encouraged  patient practice exercises 5-6 times per day for 15- 30 seconds at a time, particularly prior to/ during phone calls with her sisters or conversation to lessen vocal fatigue throughout the day.     Functional goals  Length Status Goal   Long term Initiated Patient will implement and adhere to vocal hygiene protocols on a daily basis, including the elimination of phonotraumatic behaviors.    Long term Initiated Patient will re-establish phonation with adequate balance of airflow and resonance with decreased muscle tension.    Long term Initiated Patient will improve coordination of respiration and phonation for efficient vocal production at a conversational level.    Short term Initiated Patient will reduce vocal effort and fatigue by decreasing upper body tension as evidenced by a decrease in symptoms and lack of observable/palpable signs of hyperkinetic muscular behaviors.   Short term Initiated Patient will complete SOVT exercises and/or resonant-focused exercises 3-5x daily to strengthen and balance the intrinsic laryngeal musculature and maximize glottic closure without medial hyperfunction.   Short term Initiated Patient will demonstrate the ability to increase awareness of voicing behavior through self-monitoring to facilitate generalization in functional speaking situations with 80% accuracy.    Short term Initiated Patient will clear throat fewer than two times per session.      Assessment     Rachel Silver presents with moderate dysphonia. Diagnoses pertinent to this visit include hoarseness and squamous cell carcinoma in situ of true vocal cord. Prognosis for continued improvement is good.     Recommendations / POC    Recommend 3-5 sessions of voice/speech therapy over 4-6 weeks with a speech-language pathologist to optimize glottal postures for improved vocal function, vocal efficiency, and ease of phonation. She should continue the exercises as discussed in session and should contact me with any further  questions.

## 2020-08-03 ENCOUNTER — CLINICAL SUPPORT (OUTPATIENT)
Dept: SPEECH THERAPY | Facility: HOSPITAL | Age: 75
End: 2020-08-03
Payer: MEDICARE

## 2020-08-03 DIAGNOSIS — R49.0 HOARSENESS: Primary | ICD-10-CM

## 2020-08-03 DIAGNOSIS — J38.3 LESION OF TRUE VOCAL CORD: ICD-10-CM

## 2020-08-03 PROCEDURE — 92507 TX SP LANG VOICE COMM INDIV: CPT | Mod: GN,95 | Performed by: SPEECH-LANGUAGE PATHOLOGIST

## 2020-08-03 NOTE — PROGRESS NOTES
Referring provider: Dr. Yesenia Durham  Reason for visit:  Voice treatment (CPT 02832)  Session #1    The patient location is: [place]  The chief complaint leading to consultation is: dysphonia    Visit type: audiovisual    Face to Face time with patient: 30  35 minutes of total time spent on the encounter, which includes face to face time and non-face to face time preparing to see the patient (eg, review of tests), Obtaining and/or reviewing separately obtained history, Documenting clinical information in the electronic or other health record, Independently interpreting results (not separately reported) and communicating results to the patient/family/caregiver, or Care coordination (not separately reported).     Each patient to whom he or she provides medical services by telemedicine is:  (1) informed of the relationship between the physician and patient and the respective role of any other health care provider with respect to management of the patient; and (2) notified that he or she may decline to receive medical services by telemedicine and may withdraw from such care at any time.    History / Subjective   I had the pleasure of seeing Rachel Silver for her first treatment session following complete voice evaluation on 7/24/20.  During that time, improvements were noted on SOVT voice exercises.  Since evaluation, she has been practicing occasionally.  Overall feels voice is about the same.  Does note improvement after using cup bubbles/mouth into cup phonation.  Hoarseness gets worse as the day goes on.      Objective   The primary goal of todays session was to review HEP.  This was targeted using SOVT treatment modalities.    Perceptual/behavioral assessment  -CAPE-V Overall Score: 35  -Quality: strained, intermittently rough, breathy  -Volume: appropriate for age and gender identity / low  -Pitch: appropriate for age and gender identity  -Flexibility: diminished  -Habitual respiratory pattern:  chest/clavicular    Treatment  Reviewed cup bubble phonation and mouth into cup phonation.  Educated regarding practicing exercises and carrying over to conversation for generalization.  Encouraged pt to use exercises frequently to improve ease of carryover and also to ask for feedback when talking to her sisters on the phone for a long period of time.  Encouraged voice rest.  Also reviewed using cup bubble phonation to target increasing volume w/o increasing strain.   For home practice, clinician reviewed home exercises as practiced during the session with the patient.  She was amenable to all suggestions    Functional goals  Length Status Goal   Long term Ongoing Patient will implement and adhere to vocal hygiene protocols on a daily basis, including the elimination of phonotraumatic behaviors.    Long term Ongoing Patient will re-establish phonation with adequate balance of airflow and resonance with decreased muscle tension.    Long term Ongoing Patient will improve coordination of respiration and phonation for efficient vocal production at a conversational level.    Short term Ongoing Patient will reduce vocal effort and fatigue by decreasing upper body tension as evidenced by a decrease in symptoms and lack of observable/palpable signs of hyperkinetic muscular behaviors.   Short term Ongoing Patient will complete SOVT exercises and/or resonant-focused exercises 3-5x daily to strengthen and balance the intrinsic laryngeal musculature and maximize glottic closure without medial hyperfunction.   Short term Ongoing Patient will demonstrate the ability to increase awareness of voicing behavior through self-monitoring to facilitate generalization in functional speaking situations with 80% accuracy.    Short term Initiated Patient will clear throat fewer than two times per session.      Assessment     Rachel Silver presents with moderate dysphonia. Diagnoses pertinent to this visit include hoarseness and squamous  cell carcinoma in situ of true vocal cord. Prognosis for continued improvement is good.     Recommendations / POC    Recommend 3-5 sessions of voice/speech therapy over 4-6 weeks with a speech-language pathologist to optimize glottal postures for improved vocal function, vocal efficiency, and ease of phonation. She should continue the exercises as discussed in session and should contact me with any further questions.

## 2020-08-17 ENCOUNTER — CLINICAL SUPPORT (OUTPATIENT)
Dept: SPEECH THERAPY | Facility: HOSPITAL | Age: 75
End: 2020-08-17
Payer: MEDICARE

## 2020-08-17 DIAGNOSIS — J38.3 LESION OF TRUE VOCAL CORD: ICD-10-CM

## 2020-08-17 DIAGNOSIS — R49.0 HOARSENESS: Primary | ICD-10-CM

## 2020-08-17 PROCEDURE — 92507 TX SP LANG VOICE COMM INDIV: CPT | Mod: GN,95 | Performed by: SPEECH-LANGUAGE PATHOLOGIST

## 2020-08-17 NOTE — PROGRESS NOTES
Referring provider: Dr. Yesenia Durham  Reason for visit:  Voice treatment (CPT 35969)  Session #2    The patient location is: [place]  The chief complaint leading to consultation is: dysphonia    Visit type: audiovisual    Face to Face time with patient: 30  35 minutes of total time spent on the encounter, which includes face to face time and non-face to face time preparing to see the patient (eg, review of tests), Obtaining and/or reviewing separately obtained history, Documenting clinical information in the electronic or other health record, Independently interpreting results (not separately reported) and communicating results to the patient/family/caregiver, or Care coordination (not separately reported).     Each patient to whom he or she provides medical services by telemedicine is:  (1) informed of the relationship between the physician and patient and the respective role of any other health care provider with respect to management of the patient; and (2) notified that he or she may decline to receive medical services by telemedicine and may withdraw from such care at any time.    History / Subjective   I had the pleasure of seeing Rachel Silver for her second treatment session following complete voice evaluation on 7/24/20.  During that time, improvements were noted on SOVT voice exercises.  Since last session on 8/3/20, she has been practicing more often.  Occasionally forgets but overall tries to practice several times per day.  Finds voice gets worse if she has to clear her throat a lot, but that after getting up a little mucus feels voice is improved.  Sometimes feels voice sounds normal.  Finds strain reduces as she continues to talk.  Uses exercises prior to phone calls.     Objective   The primary goal of todays session was to review HEP.  This was targeted using SOVT treatment modalities.    Perceptual/behavioral assessment  -CAPE-V Overall Score: 28  -Quality: intermittently  strained  -Volume: appropriate for age and gender identity   -Pitch: appropriate for age and gender identity  -Flexibility: diminished  -Habitual respiratory pattern: chest/clavicular    Treatment  Reviewed cup bubble phonation and mouth into cup phonation.  She demonstrated all exercises correctly w/o cueing from clinician.  Reviewed strategies for voice rest and implementing exercises daily to promote muscle memory and reducing extralaryngeal tension. For home practice, clinician reviewed home exercises as practiced during the session with the patient.  She was amenable to all suggestions    Functional goals  Length Status Goal   Long term Ongoing Patient will implement and adhere to vocal hygiene protocols on a daily basis, including the elimination of phonotraumatic behaviors.    Long term Ongoing Patient will re-establish phonation with adequate balance of airflow and resonance with decreased muscle tension.    Long term Ongoing Patient will improve coordination of respiration and phonation for efficient vocal production at a conversational level.    Short term Ongoing Patient will reduce vocal effort and fatigue by decreasing upper body tension as evidenced by a decrease in symptoms and lack of observable/palpable signs of hyperkinetic muscular behaviors.   Short term Ongoing Patient will complete SOVT exercises and/or resonant-focused exercises 3-5x daily to strengthen and balance the intrinsic laryngeal musculature and maximize glottic closure without medial hyperfunction.   Short term Ongoing Patient will demonstrate the ability to increase awareness of voicing behavior through self-monitoring to facilitate generalization in functional speaking situations with 80% accuracy.    Short term Initiated Patient will clear throat fewer than two times per session.      Assessment     Rachel Silver presents with moderate dysphonia. 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 Prognosis for continued improvement is good.     Recommendations / POC    Recommend 3-5 sessions of voice/speech therapy over 4-6 weeks with a speech-language pathologist to optimize glottal postures for improved vocal function, vocal efficiency, and ease of phonation. She should continue the exercises as discussed in session and should contact me with any further questions.

## 2020-08-28 ENCOUNTER — TELEPHONE (OUTPATIENT)
Dept: OTOLARYNGOLOGY | Facility: CLINIC | Age: 75
End: 2020-08-28

## 2020-08-28 NOTE — TELEPHONE ENCOUNTER
Spoke with patient. Explained that we can't make appointments for Urgent care. She will have to tell them she is there for a nurse's visit to get COVID tested for an office appointment procedure.

## 2020-08-28 NOTE — TELEPHONE ENCOUNTER
----- Message from Paolo Gomez sent at 8/28/2020  9:25 AM CDT -----  Regarding: self  Type: Patient Call Back    Who called: self    What is the request in detail: please contact to reschedule her COVID test.    Can the clinic reply by [facility]? no    Would the patient rather a call back or a response via [facility]? Call     Best call back number:[phone]

## 2020-08-31 ENCOUNTER — CLINICAL SUPPORT (OUTPATIENT)
Dept: URGENT CARE | Facility: CLINIC | Age: 75
End: 2020-08-31
Payer: MEDICARE

## 2020-08-31 VITALS — RESPIRATION RATE: 20 BRPM | OXYGEN SATURATION: 95 % | HEART RATE: 89 BPM | TEMPERATURE: 98 F

## 2020-08-31 DIAGNOSIS — D02.0 SQUAMOUS CELL CARCINOMA IN SITU (SCCIS) OF TRUE VOCAL CORD: ICD-10-CM

## 2020-08-31 PROCEDURE — U0003 INFECTIOUS AGENT DETECTION BY NUCLEIC ACID (DNA OR RNA); SEVERE ACUTE RESPIRATORY SYNDROME CORONAVIRUS 2 (SARS-COV-2) (CORONAVIRUS DISEASE [COVID-19]), AMPLIFIED PROBE TECHNIQUE, MAKING USE OF HIGH THROUGHPUT TECHNOLOGIES AS DESCRIBED BY CMS-2020-01-R: HCPCS

## 2020-08-31 NOTE — PROGRESS NOTES
Subjective:       Patient ID: Rachel Silver is a 74 y.o. female.    Vitals:  temperature is 98.3 °F (36.8 °C). Her pulse is 89. Her respiration is 20 and oxygen saturation is 95%.     Chief Complaint: No chief complaint on file.    Patient here today for COVID -19 testing      Constitution: Negative for chills, fatigue and fever.   HENT: Negative for congestion and sore throat.    Neck: Negative for painful lymph nodes.   Cardiovascular: Negative for chest pain and leg swelling.   Eyes: Negative for double vision and blurred vision.   Respiratory: Negative for cough and shortness of breath.    Gastrointestinal: Negative for nausea, vomiting and diarrhea.   Genitourinary: Negative for dysuria, frequency, urgency and history of kidney stones.   Musculoskeletal: Negative for joint pain, joint swelling, muscle cramps and muscle ache.   Skin: Negative for color change, pale, rash and bruising.   Allergic/Immunologic: Negative for seasonal allergies.   Neurological: Negative for dizziness, history of vertigo, light-headedness, passing out and headaches.   Hematologic/Lymphatic: Negative for swollen lymph nodes.   Psychiatric/Behavioral: Negative for nervous/anxious, sleep disturbance and depression. The patient is not nervous/anxious.        Objective:      Physical Exam      Assessment:       1. 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

## 2020-09-01 LAB — SARS-COV-2 RNA RESP QL NAA+PROBE: NOT DETECTED

## 2020-09-04 ENCOUNTER — OFFICE VISIT (OUTPATIENT)
Dept: OTOLARYNGOLOGY | Facility: CLINIC | Age: 75
End: 2020-09-04
Payer: MEDICARE

## 2020-09-04 VITALS
HEIGHT: 65 IN | SYSTOLIC BLOOD PRESSURE: 122 MMHG | DIASTOLIC BLOOD PRESSURE: 78 MMHG | TEMPERATURE: 98 F | BODY MASS INDEX: 28.45 KG/M2 | WEIGHT: 170.75 LBS

## 2020-09-04 DIAGNOSIS — J30.1 SEASONAL ALLERGIC RHINITIS DUE TO POLLEN: ICD-10-CM

## 2020-09-04 DIAGNOSIS — R49.0 HOARSE: ICD-10-CM

## 2020-09-04 DIAGNOSIS — D02.0 SQUAMOUS CELL CARCINOMA IN SITU (SCCIS) OF TRUE VOCAL CORD: Primary | ICD-10-CM

## 2020-09-04 PROCEDURE — 1159F MED LIST DOCD IN RCRD: CPT | Mod: S$GLB,,, | Performed by: OTOLARYNGOLOGY

## 2020-09-04 PROCEDURE — 1126F AMNT PAIN NOTED NONE PRSNT: CPT | Mod: S$GLB,,, | Performed by: OTOLARYNGOLOGY

## 2020-09-04 PROCEDURE — 99214 OFFICE O/P EST MOD 30 MIN: CPT | Mod: 25,S$GLB,, | Performed by: OTOLARYNGOLOGY

## 2020-09-04 PROCEDURE — 1126F PR PAIN SEVERITY QUANTIFIED, NO PAIN PRESENT: ICD-10-PCS | Mod: S$GLB,,, | Performed by: OTOLARYNGOLOGY

## 2020-09-04 PROCEDURE — 3074F SYST BP LT 130 MM HG: CPT | Mod: CPTII,S$GLB,, | Performed by: OTOLARYNGOLOGY

## 2020-09-04 PROCEDURE — 1101F PR PT FALLS ASSESS DOC 0-1 FALLS W/OUT INJ PAST YR: ICD-10-PCS | Mod: CPTII,S$GLB,, | Performed by: OTOLARYNGOLOGY

## 2020-09-04 PROCEDURE — 3078F PR MOST RECENT DIASTOLIC BLOOD PRESSURE < 80 MM HG: ICD-10-PCS | Mod: CPTII,S$GLB,, | Performed by: OTOLARYNGOLOGY

## 2020-09-04 PROCEDURE — 3074F PR MOST RECENT SYSTOLIC BLOOD PRESSURE < 130 MM HG: ICD-10-PCS | Mod: CPTII,S$GLB,, | Performed by: OTOLARYNGOLOGY

## 2020-09-04 PROCEDURE — 3008F PR BODY MASS INDEX (BMI) DOCUMENTED: ICD-10-PCS | Mod: CPTII,S$GLB,, | Performed by: OTOLARYNGOLOGY

## 2020-09-04 PROCEDURE — 3078F DIAST BP <80 MM HG: CPT | Mod: CPTII,S$GLB,, | Performed by: OTOLARYNGOLOGY

## 2020-09-04 PROCEDURE — 31575 DIAGNOSTIC LARYNGOSCOPY: CPT | Mod: S$GLB,,, | Performed by: OTOLARYNGOLOGY

## 2020-09-04 PROCEDURE — 99214 PR OFFICE/OUTPT VISIT, EST, LEVL IV, 30-39 MIN: ICD-10-PCS | Mod: 25,S$GLB,, | Performed by: OTOLARYNGOLOGY

## 2020-09-04 PROCEDURE — 1101F PT FALLS ASSESS-DOCD LE1/YR: CPT | Mod: CPTII,S$GLB,, | Performed by: OTOLARYNGOLOGY

## 2020-09-04 PROCEDURE — 1159F PR MEDICATION LIST DOCUMENTED IN MEDICAL RECORD: ICD-10-PCS | Mod: S$GLB,,, | Performed by: OTOLARYNGOLOGY

## 2020-09-04 PROCEDURE — 31575 PR LARYNGOSCOPY, FLEXIBLE; DIAGNOSTIC: ICD-10-PCS | Mod: S$GLB,,, | Performed by: OTOLARYNGOLOGY

## 2020-09-04 PROCEDURE — 3008F BODY MASS INDEX DOCD: CPT | Mod: CPTII,S$GLB,, | Performed by: OTOLARYNGOLOGY

## 2020-09-04 RX ORDER — BUDESONIDE 0.5 MG/2ML
0.5 INHALANT ORAL DAILY
Qty: 60 ML | Refills: 2 | Status: SHIPPED | OUTPATIENT
Start: 2020-09-04 | End: 2021-03-22

## 2020-09-04 NOTE — PROGRESS NOTES
OTOLARYNGOLOGY CLINIC NOTE  Date:  09/04/2020     Chief complaint:  Chief Complaint   Patient presents with    Follow-up       History of Present Illness  Rachel Silver is a 74 y.o. female  presenting today for a follow up of scca in situ of left vocal fold. She underwent DL biopsy which was concerning for SCCa in situ, could not rule out invasive scca (6-11-20) who underwent microlaryngeal excision of vocal cord lesion with CO2 laser on 7-9-20 of her left true vocal fold. CO2 laser also used to vaporize resection bed. Pathology from cord excision showed moderate dysplasia with some crush artifact and sectioning artifact but did not appear to have any residual scca in situ. Scope exam at postop visit showed well healing tissue with some keratosis/edema as expected for postop course.   She is here today for ~2 month follow up with scope exam.She has undergo eval with SLP and has done better with her voice when she does exercises although sometimes she forgets. She has not noticed any worsening of her voice and no swallowing issue.     She feels like she has been clearing her throat a lot in the recent few days and throat feels slightly irritated.  Past Medical History  Past Medical History:   Diagnosis Date    Atrial fibrillation     Cataracts, bilateral     Hoarse     Hypertension     Vaginal delivery     x3        Past Surgical History  Past Surgical History:   Procedure Laterality Date    HYSTERECTOMY      LARYNGOSCOPY N/A 6/11/2020    Procedure: LARYNGOSCOPY;  Surgeon: Yesenia Durham MD;  Location: [facility] OR;  Service: ENT;  Laterality: N/A;  RN PRE OP, COVID NEGATIVE-- 6-8-2020 CA  HAS-Cardiac Clearance today 6-8 OM    MICROLARYNGOSCOPY N/A 7/9/2020    Procedure: MICROLARYNGOSCOPY;  Surgeon: Yesenia Durham MD;  Location: [facility] OR;  Service: ENT;  Laterality: N/A;    Microlaryngoscopy with biopsy vocal cords      TONSILLECTOMY      torn meniscus      VOCAL FOLD LESION EXCISION  7/9/2020     Procedure: EXCISION, LESION, VOCAL CORD, USING LASER;  Surgeon: Yesenia Durham MD;  Location: [facility];  Service: ENT;;        Medications  Current Outpatient Medications on File Prior to Visit   Medication Sig Dispense Refill    diltiaZEM HCl (CARDIZEM LA) 240 mg 24 hr tablet Take 1 tablet (240 mg total) by mouth once daily. 90 tablet 3    fexofenadine (ALLEGRA) 60 MG tablet Take 1 tablet (60 mg total) by mouth 2 (two) times daily. Take 1 tablet twice a day as needed for allergy 30 tablet 11    flecainide (TAMBOCOR) 100 MG Tab Take 1 tablet (100 mg total) by mouth every 12 (twelve) hours. 180 tablet 3    hydroCHLOROthiazide (HYDRODIURIL) 25 MG tablet Take 1 tablet (25 mg total) by mouth once daily. 90 tablet 3    HYDROcodone-acetaminophen (NORCO) 5-325 mg per tablet Take 1 tablet by mouth every 6 (six) hours as needed for Pain (severe pain). 15 tablet 0    rivaroxaban (XARELTO) 20 mg Tab Take 1 tablet (20 mg total) by mouth once daily. 90 tablet 3    famotidine (PEPCID) 20 MG tablet Take 1 tablet (20 mg total) by mouth 2 (two) times daily. for 14 days 28 tablet 0    rosuvastatin (CRESTOR) 40 MG Tab Take 1 tablet (40 mg total) by mouth every evening. 90 tablet 3     Current Facility-Administered Medications on File Prior to Visit   Medication Dose Route Frequency Provider Last Rate Last Dose    sodium chloride 0.9% flush 10 mL  10 mL Intravenous PRN Yesenia Durham MD           Review of Systems  Review of Systems   Constitutional: Negative for fever and weight loss.   HENT:        Frequent throat clearing, postnasal drip  Sneezing a lot and then cough and feels vocal strain after that   Respiratory: Negative for hemoptysis and shortness of breath.    Endo/Heme/Allergies: Positive for environmental allergies.        Social History   reports that she quit smoking about 32 years ago. She smoked 1.50 packs per day. She has never used smokeless tobacco. She reports current alcohol use. She reports that  "she does not use drugs.     Family History  Family History   Problem Relation Age of Onset    Heart failure Mother     Stroke Father     Stroke Brother     Heart disease Maternal Aunt     Heart disease Maternal Uncle     Heart disease Maternal Aunt     Heart disease Maternal Aunt     Heart disease Maternal Uncle         Physical Exam   Vitals:    09/04/20 0915   BP: 122/78   Temp: 98.1 °F (36.7 °C)    Body mass index is 28.41 kg/m².  Weight: 77.4 kg (170 lb 11.9 oz)   Height: 5' 5" (165.1 cm)     GENERAL: no acute distress.  HEAD: normocephalic.   SKIN: no lesions of skin of head and neck area.  EYES: lids and lashes normal. Pupils equal  No proptosis  EARS: external ear without lesion, normal pinna shape and position.  External auditory canal with normal cerumen, tympanic membrane fully visible, no perforation , no retraction. No middle ear effusion. Ossicles intact.   NOSE: external nose without abnormality, septum grossly midline on anterior rhinoscopy  ORAL CAVITY/OROPHARYNX:. tongue midline and mobile.Symmetric palate rise. Uvula midline.   NECK: trachea midline.  No scars.  LYMPH NODES:No cervical lymphadenopathy.  RESPIRATORY: no stridor, no stertor. Voice normal. Respirations nonlabored.  NEURO: alert, responds to questions appropriately.   PSYCH:mood appropriate    PROCEDURE NOTE  NAME OF PROCEDURE: Flexible Laryngoscopy, diagnostic  INDICATIONS:history of scca in situ of left true vocal fold  FINDINGS: slight erythema  along length of left true vocal fold, no overt lesion     Consent: After procedure was explained in detail and all questions answered, verbal consent was obtained for performing flexible laryngoscopy.  Anesthesia: topical 4% lidocaine and neosynephrine  Procedure: With patient in seated position, the scope was inserted into the bilateral  nasal passageway and advanced atraumatically on the right into the nasopharynx to examine the following structures  Nasal cavity: turbinate " hypertrophy bilaterally, mild edema of middle meati bilaterally.  Nasopharynx: no mass or lesion noted in nasopharynx.   Oropharynx: base of tongue without  mass or ulceration. Lingual tonsils normal in appearance  Hypopharynx: posterior pharyngeal wall without mass or lesion. No pooling of secretions. Pyriform sinuses visible without mass or lesion  Larynx: epiglottis normal without lesion. False vocal folds without edema/erythema/lesion. True vocal folds mobile . Left true vocal fold with erythema, no keratosis or lesion. No interarytenoid edema nor erythema . Postcricoid region without edema or lesion  Subglottis: visualized portion of subglottis normal in appearance    After examination performed, the scope was removed atraumatically . The patient tolerated the procedure well.photodocumentation was obtained however unfortunately there was an issue with transferring images and the images were deleted somehow,.    Imaging:  The patient does not have any pertinent and/or recent imaging of the head and neck since last visit.     Labs:  CBC  Recent Labs   Lab 06/08/20  0940   WBC 7.37   Hemoglobin 13.1   Hematocrit 37.7   Mean Corpuscular Volume 85   Platelets 215     BMP  Recent Labs   Lab 07/07/20  1020   Glucose 99   Sodium 140   Potassium 3.9   Chloride 104   CO2 30 H   BUN, Bld 15   Creatinine 0.9   Calcium 9.5     COAGS        Assessment  1. 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 Hoarse    3. Seasonal allergic rhinitis due to pollen       Plan:  Discussed plan of care with patient in detail and all questions answered. Patient reported understanding of plan of care.   scca in situ s/p CO2 laser excision left true fold lesion 7/2020: Suspect residual erythema is due to continued healing . Will see her back in 6 weeks and if appearance persists will discuss potentially DL in OR with possible biopsy to ensure no recurrence of dysplasia/scca in situ    We discussed signs and symptoms (  worsening of voice, hemoptysis, difficulty swallowing)- if develops any of these , she should let me know immediately for office visit ASAP    Continue exercises given by SLP    Allergic rhinitis: this may be contributing to some of her PND/throat clearing issues. Will try budesonide in saline rinse. Instructed to rinse mouth out with water after to avoid thrush.    PND may also be from LPR. Did not see a lot of signs of this on scope exam today however if symptoms persist after a couple of weeks on nasal rinse, would recommend restarting pepcid. Can try PPI if that does not work     Follow up 6 weeks

## 2020-09-04 NOTE — PATIENT INSTRUCTIONS
Information and instructions from your visit with me today:    · Start using the following medication nasal sprays:   Budesonide respule- add one respule to neilmed rinse and rinse nose daily. Wash mouth out after using to avoid getting thrush    · Start nasal irrigations with saline solution- you can either use a rinse or a mist spray:    SALINE SINUS RINSE (Shan Med brand): You should do a full bottle, half on one side of your nose and half on the other, 1-2 times per day (or more if able to, you cannot do this too much). Follow the instructions on the box: mix the salt packet with clean water (bottle, previously boiled, distilled, etc -- not tap water) to the line on the bottle to make the irrigation.         NASAL SALINE SPRAY ( simply saline and arm and hammer are examples) There are several different brands found in the cold and flu aisle of the pharmacy. You can use any brand of saline spray - this will deliver the saline by a gentle mist ( if you have difficulty or discomfort with nasal rinse/ a lot of fluid in the nose, this will be more comfortable).     Always rinse your nose with saline prior to using medication sprays and wait a couple of hours before using again. You can use the saline throughout the day to help with stuffy nose or dry nose.    · Do not use nasal decongestant sprays such as Afrin or similar products long term ( over 3 days) .  This can cause long term physical nasal addiction. Afrin should only be used if having nose bleeds, severe nasal congestion , or severe ear pain/fullness and should not be used for more than 2-3 days in a row . It is a not a medication that should be used for a long period of time.     It was nice meeting you today, and I look forward to helping you feel better soon. Please don't hesitate to call if you have any other questions or concerns, or if I can be of any assistance in the meantime.      Yesenia Durham MD    [facility]     Phone  [phone]     Fax      [phone]        Yesenia Durham MD  Otorhinolaryngology

## 2020-10-01 RX ORDER — ROSUVASTATIN CALCIUM 40 MG/1
TABLET, COATED ORAL
Qty: 90 TABLET | Refills: 3 | Status: SHIPPED | OUTPATIENT
Start: 2020-10-01 | End: 2021-12-17

## 2020-10-05 ENCOUNTER — TELEPHONE (OUTPATIENT)
Dept: OTOLARYNGOLOGY | Facility: CLINIC | Age: 75
End: 2020-10-05

## 2020-10-05 NOTE — TELEPHONE ENCOUNTER
Spoke to patient she thinks she might have bitten the inside of her mouth. She will watch it and message us if something happens again.

## 2020-10-05 NOTE — TELEPHONE ENCOUNTER
----- Message from Rachelle Sorin sent at 10/5/2020 12:17 PM CDT -----  Contact: Self [phone]  Type: Patient Call Back    Who called: Self     What is the request in detail: Pt is calling because now she is spitting up blood and she would like to speak with someone in regards to it    Can the clinic reply by [facility]? Call back    Would the patient rather a call back or a response via [facility]? Call back    Best call back number: [phone]

## 2020-11-04 RX ORDER — DILTIAZEM HYDROCHLORIDE EXTENDED-RELEASE TABLETS 240 MG/1
TABLET, EXTENDED RELEASE ORAL
Qty: 90 TABLET | Refills: 3 | Status: SHIPPED | OUTPATIENT
Start: 2020-11-04 | End: 2021-11-12 | Stop reason: SDUPTHER

## 2020-11-06 ENCOUNTER — OFFICE VISIT (OUTPATIENT)
Dept: OTOLARYNGOLOGY | Facility: CLINIC | Age: 75
End: 2020-11-06
Payer: MEDICARE

## 2020-11-06 VITALS
HEIGHT: 65 IN | TEMPERATURE: 98 F | DIASTOLIC BLOOD PRESSURE: 70 MMHG | BODY MASS INDEX: 27.95 KG/M2 | SYSTOLIC BLOOD PRESSURE: 130 MMHG | WEIGHT: 167.75 LBS

## 2020-11-06 DIAGNOSIS — D02.0 SQUAMOUS CELL CARCINOMA IN SITU (SCCIS) OF TRUE VOCAL CORD: Primary | ICD-10-CM

## 2020-11-06 DIAGNOSIS — H61.22 EXCESSIVE CERUMEN IN LEFT EAR CANAL: ICD-10-CM

## 2020-11-06 DIAGNOSIS — J30.1 SEASONAL ALLERGIC RHINITIS DUE TO POLLEN: ICD-10-CM

## 2020-11-06 PROCEDURE — 99214 PR OFFICE/OUTPT VISIT, EST, LEVL IV, 30-39 MIN: ICD-10-PCS | Mod: 25,S$GLB,, | Performed by: OTOLARYNGOLOGY

## 2020-11-06 PROCEDURE — 3008F BODY MASS INDEX DOCD: CPT | Mod: CPTII,S$GLB,, | Performed by: OTOLARYNGOLOGY

## 2020-11-06 PROCEDURE — 3288F PR FALLS RISK ASSESSMENT DOCUMENTED: ICD-10-PCS | Mod: CPTII,S$GLB,, | Performed by: OTOLARYNGOLOGY

## 2020-11-06 PROCEDURE — 1159F PR MEDICATION LIST DOCUMENTED IN MEDICAL RECORD: ICD-10-PCS | Mod: S$GLB,,, | Performed by: OTOLARYNGOLOGY

## 2020-11-06 PROCEDURE — 3288F FALL RISK ASSESSMENT DOCD: CPT | Mod: CPTII,S$GLB,, | Performed by: OTOLARYNGOLOGY

## 2020-11-06 PROCEDURE — 1101F PT FALLS ASSESS-DOCD LE1/YR: CPT | Mod: CPTII,S$GLB,, | Performed by: OTOLARYNGOLOGY

## 2020-11-06 PROCEDURE — 3078F DIAST BP <80 MM HG: CPT | Mod: CPTII,S$GLB,, | Performed by: OTOLARYNGOLOGY

## 2020-11-06 PROCEDURE — 3008F PR BODY MASS INDEX (BMI) DOCUMENTED: ICD-10-PCS | Mod: CPTII,S$GLB,, | Performed by: OTOLARYNGOLOGY

## 2020-11-06 PROCEDURE — 1101F PR PT FALLS ASSESS DOC 0-1 FALLS W/OUT INJ PAST YR: ICD-10-PCS | Mod: CPTII,S$GLB,, | Performed by: OTOLARYNGOLOGY

## 2020-11-06 PROCEDURE — 31575 PR LARYNGOSCOPY, FLEXIBLE; DIAGNOSTIC: ICD-10-PCS | Mod: S$GLB,,, | Performed by: OTOLARYNGOLOGY

## 2020-11-06 PROCEDURE — 1159F MED LIST DOCD IN RCRD: CPT | Mod: S$GLB,,, | Performed by: OTOLARYNGOLOGY

## 2020-11-06 PROCEDURE — 3075F SYST BP GE 130 - 139MM HG: CPT | Mod: CPTII,S$GLB,, | Performed by: OTOLARYNGOLOGY

## 2020-11-06 PROCEDURE — 3078F PR MOST RECENT DIASTOLIC BLOOD PRESSURE < 80 MM HG: ICD-10-PCS | Mod: CPTII,S$GLB,, | Performed by: OTOLARYNGOLOGY

## 2020-11-06 PROCEDURE — 3075F PR MOST RECENT SYSTOLIC BLOOD PRESS GE 130-139MM HG: ICD-10-PCS | Mod: CPTII,S$GLB,, | Performed by: OTOLARYNGOLOGY

## 2020-11-06 PROCEDURE — 1126F PR PAIN SEVERITY QUANTIFIED, NO PAIN PRESENT: ICD-10-PCS | Mod: S$GLB,,, | Performed by: OTOLARYNGOLOGY

## 2020-11-06 PROCEDURE — 99214 OFFICE O/P EST MOD 30 MIN: CPT | Mod: 25,S$GLB,, | Performed by: OTOLARYNGOLOGY

## 2020-11-06 PROCEDURE — 31575 DIAGNOSTIC LARYNGOSCOPY: CPT | Mod: S$GLB,,, | Performed by: OTOLARYNGOLOGY

## 2020-11-06 PROCEDURE — 1126F AMNT PAIN NOTED NONE PRSNT: CPT | Mod: S$GLB,,, | Performed by: OTOLARYNGOLOGY

## 2020-11-06 NOTE — PROGRESS NOTES
OTOLARYNGOLOGY CLINIC NOTE  Date:  11/06/2020     Chief complaint:  Chief Complaint   Patient presents with    Follow-up     Hoarseness       History of Present Illness  Rachel Silver is a 74 y.o. 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 She underwent DL biopsy which was concerning for SCCa in situ, could not rule out invasive scca (6-11-20) who underwent microlaryngeal excision of vocal cord lesion with CO2 laser on 7-9-20 of her left true vocal fold. CO2 laser also used to vaporize resection bed. Pathology from cord excision showed moderate dysplasia with some crush artifact and sectioning artifact but did not appear to have any residual scca in situ. Scope exam at initial postop visit showed well healing tissue with some keratosis/edema as expected for postop course. Last scope exam on 9-4-20 did not show evidence of recurrent disease.     Hoarseness is better, voice sometimes worse with prolonged talking  Still with throat clearing has not been doing slp exercises.    Having some postnasal drip - did not get budesonide that I prescribed her previously for nasal congestion and PND.    No swallowing issues. No ear pain. No hemoptysis.    Had bit her cheek earlier this month and bled once but no other oral bleeding. She has no oral cavity pain.       Past Medical History  Past Medical History:   Diagnosis Date    Atrial fibrillation     Cataracts, bilateral     Hoarse     Hypertension     Vaginal delivery     x3        Past Surgical History  Past Surgical History:   Procedure Laterality Date    HYSTERECTOMY      LARYNGOSCOPY N/A 6/11/2020    Procedure: LARYNGOSCOPY;  Surgeon: Yesenia Durham MD;  Location: [facility] OR;  Service: ENT;  Laterality: N/A;  RN PRE OP, COVID NEGATIVE-- 6-8-2020 [place]  HAS-Cardiac Clearance today 6-8 [facility]    MICROLARYNGOSCOPY N/A 7/9/2020    Procedure: MICROLARYNGOSCOPY;  Surgeon: Yesenia Durham MD;  Location: [facility] OR;  Service: ENT;   Laterality: N/A;    Microlaryngoscopy with biopsy vocal cords      TONSILLECTOMY      torn meniscus      VOCAL FOLD LESION EXCISION  7/9/2020    Procedure: EXCISION, LESION, VOCAL CORD, USING LASER;  Surgeon: Yesenia Durham MD;  Location: [facility];  Service: ENT;;        Medications  Current Outpatient Medications on File Prior to Visit   Medication Sig Dispense Refill    diltiaZEM HCl (CARDIZEM LA) 240 mg 24 hr tablet Take 1 tablet by mouth once daily 90 tablet 3    fexofenadine (ALLEGRA) 60 MG tablet Take 1 tablet (60 mg total) by mouth 2 (two) times daily. Take 1 tablet twice a day as needed for allergy 30 tablet 11    flecainide (TAMBOCOR) 100 MG Tab Take 1 tablet (100 mg total) by mouth every 12 (twelve) hours. 180 tablet 3    hydroCHLOROthiazide (HYDRODIURIL) 25 MG tablet Take 1 tablet (25 mg total) by mouth once daily. 90 tablet 3    HYDROcodone-acetaminophen (NORCO) 5-325 mg per tablet Take 1 tablet by mouth every 6 (six) hours as needed for Pain (severe pain). 15 tablet 0    rivaroxaban (XARELTO) 20 mg Tab Take 1 tablet (20 mg total) by mouth once daily. 90 tablet 3    rosuvastatin (CRESTOR) 40 MG Tab TAKE 1 TABLET BY MOUTH ONCE DAILY IN THE EVENING 90 tablet 3    budesonide (PULMICORT) 0.5 mg/2 mL nebulizer solution Take 2 mLs (0.5 mg total) by nebulization once daily. Put in the neilmed sinus rinse and rinse nose out daily 60 mL 2    famotidine (PEPCID) 20 MG tablet Take 1 tablet (20 mg total) by mouth 2 (two) times daily. for 14 days 28 tablet 0     Current Facility-Administered Medications on File Prior to Visit   Medication Dose Route Frequency Provider Last Rate Last Dose    sodium chloride 0.9% flush 10 mL  10 mL Intravenous PRN Yesenia Durham MD           Review of Systems  Review of Systems   Constitutional: Negative for weight loss.   HENT: Positive for congestion. Negative for ear pain.         No dysphagia   Respiratory: Negative for hemoptysis.    Musculoskeletal: Negative  "for neck pain.   Neurological: Negative for headaches.        Social History   reports that she quit smoking about 33 years ago. She smoked 1.50 packs per day. She has never used smokeless tobacco. She reports current alcohol use. She reports that she does not use drugs.     Family History  Family History   Problem Relation Age of Onset    Heart failure Mother     Stroke Father     Stroke Brother     Heart disease Maternal Aunt     Heart disease Maternal Uncle     Heart disease Maternal Aunt     Heart disease Maternal Aunt     Heart disease Maternal Uncle         Physical Exam   Vitals:    11/06/20 0953   BP: 130/70   Temp: 98.2 °F (36.8 °C)    Body mass index is 27.92 kg/m².  Weight: 76.1 kg (167 lb 12.3 oz)   Height: 5' 5" (165.1 cm)     GENERAL: no acute distress.  HEAD: normocephalic.   SKIN: no malignant appearing lesions of skin of head and neck area.  EYES: lids and lashes normal. Pupils equal  No proptosis  EARS: external ear without lesion, normal pinna shape and position.  External auditory canal with normal cerumen on right, ceruminosis on left, tympanic membrane fully visible, no perforation , no retraction. No middle ear effusion. Ossicles intact.   NOSE: external nose without abnormality  ORAL CAVITY/OROPHARYNX: no mass or lesion of gingiva/buccal mucosa/floor of mouth/tongue.no blood in oral cavity. Tissues in mouth soft to palpation.  tongue midline and mobile. Base of tongue, posterior oropharynx, and floor of mouth soft.  Symmetric palate rise. Uvula midline.   NECK: trachea midline.  LYMPH NODES:No cervical lymphadenopathy.  RESPIRATORY: no stridor, no stertor. Voice slightly low pitched with intermittent raspy quality but improved from prior exam. Respirations nonlabored.  NEURO: alert, responds to questions appropriately.  Facial movement symmetric with good eye closure and symmetric smile.   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  Anesthesia: topical 4% lidocaine and neosynephrine  Procedure: With patient in seated position, the scope was inserted into the bilateral nasal passageway and advanced atraumatically on the into the nasopharynx to examine the following structures:  Nasal cavity: no septal spur. Turbinates with mild hypertrophy.  mild middle meatal edema. No purulent drainage.   Nasopharynx: no mass or lesion noted in nasopharynx.   Oropharynx: base of tongue without  mass or ulceration. Lingual tonsils normal in appearance  Hypopharynx: posterior pharyngeal wall without mass or lesion. No pooling of secretions. Pyriform sinuses visible without mass or lesion  Larynx: epiglottis normal without lesion. False vocal folds without edema/erythema/lesion. True vocal folds mobile . Left posterior fold with pinpoint erythema with overlying white material that cleared with coughing mostly. No lesion of right true fold. No interarytenoid edema nor erythema . Postcricoid region without edema or lesion  Subglottis: visualized portion of subglottis normal in appearance    After examination performed, the scope was removed atraumatically . The patient tolerated the procedure well. Photodocumentation obtained with representative images below, all images and/or videos uploaded in media section of epic.  Right nasal cavity     left nasal cavity     nasopharynx    Larynx abducted    Larynx adducted            Imaging:  The patient does not have any pertinent and/or recent imaging of the head and neck.     Labs:  CBC  Recent Labs   Lab 06/08/20  0940   WBC 7.37   Hemoglobin 13.1   Hematocrit 37.7   MCV 85   Platelets 215     BMP  Recent Labs   Lab 07/07/20  1020   Glucose 99    Sodium 140   Potassium 3.9   Chloride 104   CO2 30 H   BUN 15   Creatinine 0.9   Calcium 9.5     COAGS        Assessment  1. 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 Seasonal allergic rhinitis due to pollen    3. Excessive cerumen in left ear canal       Plan:  Discussed plan of care with patient in detail and all questions answered. Patient reported understanding of plan of care.   Allergic rhinitis(AR): encouraged her to get budesonide and start nasal irrigations. Counseled on risk of bleeding, risk of increased intraocular pressure/cataract with long term use. Counseled on thrush and to rinse mouth out after  If has issue with cost, can send rx to [facility] ( I have already sent rx at prior visit to regular pharmacy)    SCCa in situ of vocal fold s/p Co2 laser excision: no evidence of recurrent disease. Mild irritation appearance - will schedule close follow up - she has been having uri and cold symptoms recently. If still appears irritated at follow up, will have low threshold for operative DL with possible biopsy if indicated. She will let me know if any worsening of voice, dysphagia or bleeding and will come in sooner if so    Hoarse: encouraged to continue exercises that SLP gave her.     Ceruminosis of left ear: debrox for 5 days and then again 5 days prior to next visit.     Follow up 6-8 weeks

## 2021-01-08 ENCOUNTER — DOCUMENTATION ONLY (OUTPATIENT)
Dept: OTOLARYNGOLOGY | Facility: CLINIC | Age: 76
End: 2021-01-08

## 2021-01-25 ENCOUNTER — TELEPHONE (OUTPATIENT)
Dept: OTOLARYNGOLOGY | Facility: CLINIC | Age: 76
End: 2021-01-25

## 2021-02-06 ENCOUNTER — CLINICAL SUPPORT (OUTPATIENT)
Dept: URGENT CARE | Facility: CLINIC | Age: 76
End: 2021-02-06
Payer: MEDICARE

## 2021-02-06 DIAGNOSIS — D02.0 SQUAMOUS CELL CARCINOMA IN SITU (SCCIS) OF TRUE VOCAL CORD: ICD-10-CM

## 2021-02-06 PROCEDURE — U0003 INFECTIOUS AGENT DETECTION BY NUCLEIC ACID (DNA OR RNA); SEVERE ACUTE RESPIRATORY SYNDROME CORONAVIRUS 2 (SARS-COV-2) (CORONAVIRUS DISEASE [COVID-19]), AMPLIFIED PROBE TECHNIQUE, MAKING USE OF HIGH THROUGHPUT TECHNOLOGIES AS DESCRIBED BY CMS-2020-01-R: HCPCS

## 2021-02-07 LAB — SARS-COV-2 RNA RESP QL NAA+PROBE: NOT DETECTED

## 2021-02-09 ENCOUNTER — OFFICE VISIT (OUTPATIENT)
Dept: OTOLARYNGOLOGY | Facility: CLINIC | Age: 76
End: 2021-02-09
Payer: MEDICARE

## 2021-02-09 VITALS
WEIGHT: 166.44 LBS | BODY MASS INDEX: 27.73 KG/M2 | DIASTOLIC BLOOD PRESSURE: 80 MMHG | SYSTOLIC BLOOD PRESSURE: 124 MMHG | TEMPERATURE: 98 F | HEIGHT: 65 IN

## 2021-02-09 DIAGNOSIS — H61.22 IMPACTED CERUMEN OF LEFT EAR: ICD-10-CM

## 2021-02-09 DIAGNOSIS — D02.0 SQUAMOUS CELL CARCINOMA IN SITU (SCCIS) OF TRUE VOCAL CORD: Primary | ICD-10-CM

## 2021-02-09 DIAGNOSIS — J30.2 SEASONAL ALLERGIC RHINITIS, UNSPECIFIED TRIGGER: ICD-10-CM

## 2021-02-09 DIAGNOSIS — R49.0 HOARSE: Primary | ICD-10-CM

## 2021-02-09 PROCEDURE — 3288F PR FALLS RISK ASSESSMENT DOCUMENTED: ICD-10-PCS | Mod: CPTII,S$GLB,, | Performed by: OTOLARYNGOLOGY

## 2021-02-09 PROCEDURE — 31575 DIAGNOSTIC LARYNGOSCOPY: CPT | Mod: S$GLB,,, | Performed by: OTOLARYNGOLOGY

## 2021-02-09 PROCEDURE — 99213 PR OFFICE/OUTPT VISIT, EST, LEVL III, 20-29 MIN: ICD-10-PCS | Mod: 25,S$GLB,, | Performed by: OTOLARYNGOLOGY

## 2021-02-09 PROCEDURE — 1159F MED LIST DOCD IN RCRD: CPT | Mod: S$GLB,,, | Performed by: OTOLARYNGOLOGY

## 2021-02-09 PROCEDURE — 1101F PR PT FALLS ASSESS DOC 0-1 FALLS W/OUT INJ PAST YR: ICD-10-PCS | Mod: CPTII,S$GLB,, | Performed by: OTOLARYNGOLOGY

## 2021-02-09 PROCEDURE — 3074F PR MOST RECENT SYSTOLIC BLOOD PRESSURE < 130 MM HG: ICD-10-PCS | Mod: CPTII,S$GLB,, | Performed by: OTOLARYNGOLOGY

## 2021-02-09 PROCEDURE — 31575 PR LARYNGOSCOPY, FLEXIBLE; DIAGNOSTIC: ICD-10-PCS | Mod: S$GLB,,, | Performed by: OTOLARYNGOLOGY

## 2021-02-09 PROCEDURE — 99213 OFFICE O/P EST LOW 20 MIN: CPT | Mod: 25,S$GLB,, | Performed by: OTOLARYNGOLOGY

## 2021-02-09 PROCEDURE — 1101F PT FALLS ASSESS-DOCD LE1/YR: CPT | Mod: CPTII,S$GLB,, | Performed by: OTOLARYNGOLOGY

## 2021-02-09 PROCEDURE — 3074F SYST BP LT 130 MM HG: CPT | Mod: CPTII,S$GLB,, | Performed by: OTOLARYNGOLOGY

## 2021-02-09 PROCEDURE — 3079F DIAST BP 80-89 MM HG: CPT | Mod: CPTII,S$GLB,, | Performed by: OTOLARYNGOLOGY

## 2021-02-09 PROCEDURE — 1126F PR PAIN SEVERITY QUANTIFIED, NO PAIN PRESENT: ICD-10-PCS | Mod: S$GLB,,, | Performed by: OTOLARYNGOLOGY

## 2021-02-09 PROCEDURE — 1126F AMNT PAIN NOTED NONE PRSNT: CPT | Mod: S$GLB,,, | Performed by: OTOLARYNGOLOGY

## 2021-02-09 PROCEDURE — 3079F PR MOST RECENT DIASTOLIC BLOOD PRESSURE 80-89 MM HG: ICD-10-PCS | Mod: CPTII,S$GLB,, | Performed by: OTOLARYNGOLOGY

## 2021-02-09 PROCEDURE — 1159F PR MEDICATION LIST DOCUMENTED IN MEDICAL RECORD: ICD-10-PCS | Mod: S$GLB,,, | Performed by: OTOLARYNGOLOGY

## 2021-02-09 PROCEDURE — 3008F PR BODY MASS INDEX (BMI) DOCUMENTED: ICD-10-PCS | Mod: CPTII,S$GLB,, | Performed by: OTOLARYNGOLOGY

## 2021-02-09 PROCEDURE — 3288F FALL RISK ASSESSMENT DOCD: CPT | Mod: CPTII,S$GLB,, | Performed by: OTOLARYNGOLOGY

## 2021-02-09 PROCEDURE — 3008F BODY MASS INDEX DOCD: CPT | Mod: CPTII,S$GLB,, | Performed by: OTOLARYNGOLOGY

## 2021-03-22 ENCOUNTER — OFFICE VISIT (OUTPATIENT)
Dept: CARDIOLOGY | Facility: CLINIC | Age: 76
End: 2021-03-22
Payer: MEDICARE

## 2021-03-22 VITALS
OXYGEN SATURATION: 98 % | BODY MASS INDEX: 27.73 KG/M2 | SYSTOLIC BLOOD PRESSURE: 121 MMHG | HEART RATE: 67 BPM | HEIGHT: 65 IN | DIASTOLIC BLOOD PRESSURE: 58 MMHG | WEIGHT: 166.44 LBS

## 2021-03-22 DIAGNOSIS — I48.0 PAF (PAROXYSMAL ATRIAL FIBRILLATION): Primary | ICD-10-CM

## 2021-03-22 DIAGNOSIS — Z91.89 AT RISK FOR CORONARY ARTERY DISEASE: ICD-10-CM

## 2021-03-22 DIAGNOSIS — Z82.49 FAMILY HISTORY OF PREMATURE CAD: ICD-10-CM

## 2021-03-22 DIAGNOSIS — I10 ESSENTIAL HYPERTENSION: ICD-10-CM

## 2021-03-22 PROCEDURE — 1159F PR MEDICATION LIST DOCUMENTED IN MEDICAL RECORD: ICD-10-PCS | Mod: S$GLB,,, | Performed by: INTERNAL MEDICINE

## 2021-03-22 PROCEDURE — 3008F PR BODY MASS INDEX (BMI) DOCUMENTED: ICD-10-PCS | Mod: CPTII,S$GLB,, | Performed by: INTERNAL MEDICINE

## 2021-03-22 PROCEDURE — 99999 PR PBB SHADOW E&M-EST. PATIENT-LVL IV: ICD-10-PCS | Mod: PBBFAC,,, | Performed by: INTERNAL MEDICINE

## 2021-03-22 PROCEDURE — 3078F PR MOST RECENT DIASTOLIC BLOOD PRESSURE < 80 MM HG: ICD-10-PCS | Mod: CPTII,S$GLB,, | Performed by: INTERNAL MEDICINE

## 2021-03-22 PROCEDURE — 3074F PR MOST RECENT SYSTOLIC BLOOD PRESSURE < 130 MM HG: ICD-10-PCS | Mod: CPTII,S$GLB,, | Performed by: INTERNAL MEDICINE

## 2021-03-22 PROCEDURE — 99999 PR PBB SHADOW E&M-EST. PATIENT-LVL IV: CPT | Mod: PBBFAC,,, | Performed by: INTERNAL MEDICINE

## 2021-03-22 PROCEDURE — 3008F BODY MASS INDEX DOCD: CPT | Mod: CPTII,S$GLB,, | Performed by: INTERNAL MEDICINE

## 2021-03-22 PROCEDURE — 99214 PR OFFICE/OUTPT VISIT, EST, LEVL IV, 30-39 MIN: ICD-10-PCS | Mod: S$GLB,,, | Performed by: INTERNAL MEDICINE

## 2021-03-22 PROCEDURE — 99214 OFFICE O/P EST MOD 30 MIN: CPT | Mod: S$GLB,,, | Performed by: INTERNAL MEDICINE

## 2021-03-22 PROCEDURE — 3074F SYST BP LT 130 MM HG: CPT | Mod: CPTII,S$GLB,, | Performed by: INTERNAL MEDICINE

## 2021-03-22 PROCEDURE — 1126F PR PAIN SEVERITY QUANTIFIED, NO PAIN PRESENT: ICD-10-PCS | Mod: S$GLB,,, | Performed by: INTERNAL MEDICINE

## 2021-03-22 PROCEDURE — 3078F DIAST BP <80 MM HG: CPT | Mod: CPTII,S$GLB,, | Performed by: INTERNAL MEDICINE

## 2021-03-22 PROCEDURE — 1159F MED LIST DOCD IN RCRD: CPT | Mod: S$GLB,,, | Performed by: INTERNAL MEDICINE

## 2021-03-22 PROCEDURE — 1126F AMNT PAIN NOTED NONE PRSNT: CPT | Mod: S$GLB,,, | Performed by: INTERNAL MEDICINE

## 2021-04-16 ENCOUNTER — PATIENT MESSAGE (OUTPATIENT)
Dept: RESEARCH | Facility: HOSPITAL | Age: 76
End: 2021-04-16

## 2021-04-16 DIAGNOSIS — I48.0 PAF (PAROXYSMAL ATRIAL FIBRILLATION): Primary | ICD-10-CM

## 2021-04-30 ENCOUNTER — OFFICE VISIT (OUTPATIENT)
Dept: ELECTROPHYSIOLOGY | Facility: CLINIC | Age: 76
End: 2021-04-30
Payer: MEDICARE

## 2021-04-30 ENCOUNTER — HOSPITAL ENCOUNTER (OUTPATIENT)
Dept: CARDIOLOGY | Facility: CLINIC | Age: 76
Discharge: HOME | End: 2021-04-30
Payer: MEDICARE

## 2021-04-30 VITALS
HEART RATE: 64 BPM | DIASTOLIC BLOOD PRESSURE: 86 MMHG | BODY MASS INDEX: 27.92 KG/M2 | WEIGHT: 167.56 LBS | HEIGHT: 65 IN | SYSTOLIC BLOOD PRESSURE: 118 MMHG

## 2021-04-30 DIAGNOSIS — I10 ESSENTIAL HYPERTENSION: Primary | ICD-10-CM

## 2021-04-30 DIAGNOSIS — E78.00 PURE HYPERCHOLESTEROLEMIA: ICD-10-CM

## 2021-04-30 DIAGNOSIS — I48.0 PAF (PAROXYSMAL ATRIAL FIBRILLATION): ICD-10-CM

## 2021-04-30 PROCEDURE — 3288F FALL RISK ASSESSMENT DOCD: CPT | Mod: CPTII,S$GLB,, | Performed by: INTERNAL MEDICINE

## 2021-04-30 PROCEDURE — 99214 OFFICE O/P EST MOD 30 MIN: CPT | Mod: S$GLB,,, | Performed by: INTERNAL MEDICINE

## 2021-04-30 PROCEDURE — 3079F PR MOST RECENT DIASTOLIC BLOOD PRESSURE 80-89 MM HG: ICD-10-PCS | Mod: CPTII,S$GLB,, | Performed by: INTERNAL MEDICINE

## 2021-04-30 PROCEDURE — 1159F PR MEDICATION LIST DOCUMENTED IN MEDICAL RECORD: ICD-10-PCS | Mod: S$GLB,,, | Performed by: INTERNAL MEDICINE

## 2021-04-30 PROCEDURE — 3074F SYST BP LT 130 MM HG: CPT | Mod: CPTII,S$GLB,, | Performed by: INTERNAL MEDICINE

## 2021-04-30 PROCEDURE — 93010 ELECTROCARDIOGRAM REPORT: CPT | Mod: S$GLB,,, | Performed by: INTERNAL MEDICINE

## 2021-04-30 PROCEDURE — 93005 ELECTROCARDIOGRAM TRACING: CPT | Mod: S$GLB,,, | Performed by: INTERNAL MEDICINE

## 2021-04-30 PROCEDURE — 3008F PR BODY MASS INDEX (BMI) DOCUMENTED: ICD-10-PCS | Mod: CPTII,S$GLB,, | Performed by: INTERNAL MEDICINE

## 2021-04-30 PROCEDURE — 3008F BODY MASS INDEX DOCD: CPT | Mod: CPTII,S$GLB,, | Performed by: INTERNAL MEDICINE

## 2021-04-30 PROCEDURE — 99999 PR PBB SHADOW E&M-EST. PATIENT-LVL III: ICD-10-PCS | Mod: PBBFAC,,, | Performed by: INTERNAL MEDICINE

## 2021-04-30 PROCEDURE — 3079F DIAST BP 80-89 MM HG: CPT | Mod: CPTII,S$GLB,, | Performed by: INTERNAL MEDICINE

## 2021-04-30 PROCEDURE — 99999 PR PBB SHADOW E&M-EST. PATIENT-LVL III: CPT | Mod: PBBFAC,,, | Performed by: INTERNAL MEDICINE

## 2021-04-30 PROCEDURE — 3288F PR FALLS RISK ASSESSMENT DOCUMENTED: ICD-10-PCS | Mod: CPTII,S$GLB,, | Performed by: INTERNAL MEDICINE

## 2021-04-30 PROCEDURE — 93010 RHYTHM STRIP: ICD-10-PCS | Mod: S$GLB,,, | Performed by: INTERNAL MEDICINE

## 2021-04-30 PROCEDURE — 93005 RHYTHM STRIP: ICD-10-PCS | Mod: S$GLB,,, | Performed by: INTERNAL MEDICINE

## 2021-04-30 PROCEDURE — 1126F PR PAIN SEVERITY QUANTIFIED, NO PAIN PRESENT: ICD-10-PCS | Mod: S$GLB,,, | Performed by: INTERNAL MEDICINE

## 2021-04-30 PROCEDURE — 1126F AMNT PAIN NOTED NONE PRSNT: CPT | Mod: S$GLB,,, | Performed by: INTERNAL MEDICINE

## 2021-04-30 PROCEDURE — 99214 PR OFFICE/OUTPT VISIT, EST, LEVL IV, 30-39 MIN: ICD-10-PCS | Mod: S$GLB,,, | Performed by: INTERNAL MEDICINE

## 2021-04-30 PROCEDURE — 1101F PR PT FALLS ASSESS DOC 0-1 FALLS W/OUT INJ PAST YR: ICD-10-PCS | Mod: CPTII,S$GLB,, | Performed by: INTERNAL MEDICINE

## 2021-04-30 PROCEDURE — 1101F PT FALLS ASSESS-DOCD LE1/YR: CPT | Mod: CPTII,S$GLB,, | Performed by: INTERNAL MEDICINE

## 2021-04-30 PROCEDURE — 3074F PR MOST RECENT SYSTOLIC BLOOD PRESSURE < 130 MM HG: ICD-10-PCS | Mod: CPTII,S$GLB,, | Performed by: INTERNAL MEDICINE

## 2021-04-30 PROCEDURE — 1159F MED LIST DOCD IN RCRD: CPT | Mod: S$GLB,,, | Performed by: INTERNAL MEDICINE

## 2021-04-30 RX ORDER — CIPROFLOXACIN 500 MG/1
500 TABLET ORAL
COMMUNITY
Start: 2021-04-22 | End: 2022-05-19

## 2021-04-30 RX ORDER — PHENAZOPYRIDINE HYDROCHLORIDE 200 MG/1
TABLET, FILM COATED ORAL
COMMUNITY
Start: 2021-04-22 | End: 2021-07-29

## 2021-05-03 ENCOUNTER — OFFICE VISIT (OUTPATIENT)
Dept: PODIATRY | Facility: CLINIC | Age: 76
End: 2021-05-03
Payer: MEDICARE

## 2021-05-03 VITALS
HEIGHT: 65 IN | SYSTOLIC BLOOD PRESSURE: 118 MMHG | BODY MASS INDEX: 27.88 KG/M2 | DIASTOLIC BLOOD PRESSURE: 63 MMHG | HEART RATE: 55 BPM

## 2021-05-03 DIAGNOSIS — L84 CORN OR CALLUS: Primary | ICD-10-CM

## 2021-05-03 DIAGNOSIS — M20.12 VALGUS DEFORMITY OF BOTH GREAT TOES: ICD-10-CM

## 2021-05-03 DIAGNOSIS — M20.42 HAMMER TOES OF BOTH FEET: ICD-10-CM

## 2021-05-03 DIAGNOSIS — M20.11 VALGUS DEFORMITY OF BOTH GREAT TOES: ICD-10-CM

## 2021-05-03 DIAGNOSIS — M20.41 HAMMER TOES OF BOTH FEET: ICD-10-CM

## 2021-05-03 PROCEDURE — 3074F SYST BP LT 130 MM HG: CPT | Mod: CPTII,S$GLB,, | Performed by: PODIATRIST

## 2021-05-03 PROCEDURE — 99203 PR OFFICE/OUTPT VISIT, NEW, LEVL III, 30-44 MIN: ICD-10-PCS | Mod: S$GLB,,, | Performed by: PODIATRIST

## 2021-05-03 PROCEDURE — 1159F PR MEDICATION LIST DOCUMENTED IN MEDICAL RECORD: ICD-10-PCS | Mod: CPTII,S$GLB,, | Performed by: PODIATRIST

## 2021-05-03 PROCEDURE — 3078F DIAST BP <80 MM HG: CPT | Mod: CPTII,S$GLB,, | Performed by: PODIATRIST

## 2021-05-03 PROCEDURE — 1126F AMNT PAIN NOTED NONE PRSNT: CPT | Mod: CPTII,S$GLB,, | Performed by: PODIATRIST

## 2021-05-03 PROCEDURE — 1159F MED LIST DOCD IN RCRD: CPT | Mod: CPTII,S$GLB,, | Performed by: PODIATRIST

## 2021-05-03 PROCEDURE — 3008F PR BODY MASS INDEX (BMI) DOCUMENTED: ICD-10-PCS | Mod: CPTII,S$GLB,, | Performed by: PODIATRIST

## 2021-05-03 PROCEDURE — 99999 PR PBB SHADOW E&M-EST. PATIENT-LVL III: ICD-10-PCS | Mod: PBBFAC,,, | Performed by: PODIATRIST

## 2021-05-03 PROCEDURE — 3008F BODY MASS INDEX DOCD: CPT | Mod: CPTII,S$GLB,, | Performed by: PODIATRIST

## 2021-05-03 PROCEDURE — 99999 PR PBB SHADOW E&M-EST. PATIENT-LVL III: CPT | Mod: PBBFAC,,, | Performed by: PODIATRIST

## 2021-05-03 PROCEDURE — 1126F PR PAIN SEVERITY QUANTIFIED, NO PAIN PRESENT: ICD-10-PCS | Mod: CPTII,S$GLB,, | Performed by: PODIATRIST

## 2021-05-03 PROCEDURE — 3074F PR MOST RECENT SYSTOLIC BLOOD PRESSURE < 130 MM HG: ICD-10-PCS | Mod: CPTII,S$GLB,, | Performed by: PODIATRIST

## 2021-05-03 PROCEDURE — 99203 OFFICE O/P NEW LOW 30 MIN: CPT | Mod: S$GLB,,, | Performed by: PODIATRIST

## 2021-05-03 PROCEDURE — 3078F PR MOST RECENT DIASTOLIC BLOOD PRESSURE < 80 MM HG: ICD-10-PCS | Mod: CPTII,S$GLB,, | Performed by: PODIATRIST

## 2021-05-03 RX ORDER — AMMONIUM LACTATE 12 G/100G
1 CREAM TOPICAL DAILY
Qty: 140 G | Refills: 1 | Status: SHIPPED | OUTPATIENT
Start: 2021-05-03 | End: 2022-05-19

## 2021-05-18 RX ORDER — HYDROCHLOROTHIAZIDE 25 MG/1
TABLET ORAL
Qty: 90 TABLET | Refills: 3 | Status: SHIPPED | OUTPATIENT
Start: 2021-05-18 | End: 2022-06-01

## 2021-05-20 ENCOUNTER — PATIENT MESSAGE (OUTPATIENT)
Dept: UROLOGY | Facility: CLINIC | Age: 76
End: 2021-05-20

## 2021-05-20 ENCOUNTER — LAB VISIT (OUTPATIENT)
Dept: LAB | Facility: HOSPITAL | Age: 76
End: 2021-05-20
Attending: UROLOGY
Payer: MEDICARE

## 2021-05-20 ENCOUNTER — OFFICE VISIT (OUTPATIENT)
Dept: UROLOGY | Facility: CLINIC | Age: 76
End: 2021-05-20
Payer: MEDICARE

## 2021-05-20 VITALS
HEART RATE: 74 BPM | BODY MASS INDEX: 28.14 KG/M2 | DIASTOLIC BLOOD PRESSURE: 82 MMHG | SYSTOLIC BLOOD PRESSURE: 138 MMHG | WEIGHT: 168.88 LBS | HEIGHT: 65 IN

## 2021-05-20 DIAGNOSIS — N39.0 RECURRENT UTI: ICD-10-CM

## 2021-05-20 DIAGNOSIS — N39.0 RECURRENT UTI: Primary | ICD-10-CM

## 2021-05-20 DIAGNOSIS — R31.29 MICROSCOPIC HEMATURIA: ICD-10-CM

## 2021-05-20 DIAGNOSIS — N95.2 VAGINAL ATROPHY: ICD-10-CM

## 2021-05-20 DIAGNOSIS — R33.9 INCOMPLETE EMPTYING OF BLADDER: ICD-10-CM

## 2021-05-20 LAB
ANION GAP SERPL CALC-SCNC: 7 MMOL/L (ref 8–16)
BUN SERPL-MCNC: 13 MG/DL (ref 8–23)
CALCIUM SERPL-MCNC: 10 MG/DL (ref 8.7–10.5)
CHLORIDE SERPL-SCNC: 104 MMOL/L (ref 95–110)
CO2 SERPL-SCNC: 31 MMOL/L (ref 23–29)
CREAT SERPL-MCNC: 0.9 MG/DL (ref 0.5–1.4)
EST. GFR  (AFRICAN AMERICAN): >60 ML/MIN/1.73 M^2
EST. GFR  (NON AFRICAN AMERICAN): >60 ML/MIN/1.73 M^2
GLUCOSE SERPL-MCNC: 93 MG/DL (ref 70–110)
POTASSIUM SERPL-SCNC: 3.5 MMOL/L (ref 3.5–5.1)
SODIUM SERPL-SCNC: 142 MMOL/L (ref 136–145)

## 2021-05-20 PROCEDURE — 36415 COLL VENOUS BLD VENIPUNCTURE: CPT | Performed by: UROLOGY

## 2021-05-20 PROCEDURE — 3008F BODY MASS INDEX DOCD: CPT | Mod: CPTII,S$GLB,, | Performed by: UROLOGY

## 2021-05-20 PROCEDURE — 3288F PR FALLS RISK ASSESSMENT DOCUMENTED: ICD-10-PCS | Mod: CPTII,S$GLB,, | Performed by: UROLOGY

## 2021-05-20 PROCEDURE — 1126F PR PAIN SEVERITY QUANTIFIED, NO PAIN PRESENT: ICD-10-PCS | Mod: S$GLB,,, | Performed by: UROLOGY

## 2021-05-20 PROCEDURE — 3008F PR BODY MASS INDEX (BMI) DOCUMENTED: ICD-10-PCS | Mod: CPTII,S$GLB,, | Performed by: UROLOGY

## 2021-05-20 PROCEDURE — 99999 PR PBB SHADOW E&M-EST. PATIENT-LVL IV: ICD-10-PCS | Mod: PBBFAC,,, | Performed by: UROLOGY

## 2021-05-20 PROCEDURE — 1159F PR MEDICATION LIST DOCUMENTED IN MEDICAL RECORD: ICD-10-PCS | Mod: S$GLB,,, | Performed by: UROLOGY

## 2021-05-20 PROCEDURE — 99205 PR OFFICE/OUTPT VISIT, NEW, LEVL V, 60-74 MIN: ICD-10-PCS | Mod: 25,S$GLB,, | Performed by: UROLOGY

## 2021-05-20 PROCEDURE — 1159F MED LIST DOCD IN RCRD: CPT | Mod: S$GLB,,, | Performed by: UROLOGY

## 2021-05-20 PROCEDURE — 99999 PR PBB SHADOW E&M-EST. PATIENT-LVL IV: CPT | Mod: PBBFAC,,, | Performed by: UROLOGY

## 2021-05-20 PROCEDURE — 1100F PTFALLS ASSESS-DOCD GE2>/YR: CPT | Mod: CPTII,S$GLB,, | Performed by: UROLOGY

## 2021-05-20 PROCEDURE — 1100F PR PT FALLS ASSESS DOC 2+ FALLS/FALL W/INJURY/YR: ICD-10-PCS | Mod: CPTII,S$GLB,, | Performed by: UROLOGY

## 2021-05-20 PROCEDURE — 99205 OFFICE O/P NEW HI 60 MIN: CPT | Mod: 25,S$GLB,, | Performed by: UROLOGY

## 2021-05-20 PROCEDURE — 3288F FALL RISK ASSESSMENT DOCD: CPT | Mod: CPTII,S$GLB,, | Performed by: UROLOGY

## 2021-05-20 PROCEDURE — 1126F AMNT PAIN NOTED NONE PRSNT: CPT | Mod: S$GLB,,, | Performed by: UROLOGY

## 2021-05-20 PROCEDURE — 51701 PR INSERTION OF NON-INDWELLING BLADDER CATHETERIZATION FOR RESIDUAL UR: ICD-10-PCS | Mod: S$GLB,,, | Performed by: UROLOGY

## 2021-05-20 PROCEDURE — 51701 INSERT BLADDER CATHETER: CPT | Mod: S$GLB,,, | Performed by: UROLOGY

## 2021-05-20 PROCEDURE — 80048 BASIC METABOLIC PNL TOTAL CA: CPT | Performed by: UROLOGY

## 2021-05-20 RX ORDER — LIDOCAINE HYDROCHLORIDE 20 MG/ML
JELLY TOPICAL ONCE
Status: CANCELLED | OUTPATIENT
Start: 2021-05-20 | End: 2021-05-20

## 2021-05-20 RX ORDER — ESTRADIOL 0.1 MG/G
1 CREAM VAGINAL
Qty: 45 G | Refills: 3 | Status: SHIPPED | OUTPATIENT
Start: 2021-05-21 | End: 2021-05-20 | Stop reason: CLARIF

## 2021-05-20 RX ORDER — ESTRADIOL 0.1 MG/G
1 CREAM VAGINAL
Qty: 45 G | Refills: 3 | Status: SHIPPED | OUTPATIENT
Start: 2021-05-21 | End: 2022-05-19

## 2021-05-20 RX ORDER — DOXYCYCLINE HYCLATE 100 MG
100 TABLET ORAL ONCE
Status: CANCELLED | OUTPATIENT
Start: 2021-05-20 | End: 2021-05-20

## 2021-06-05 NOTE — DISCHARGE INSTRUCTIONS
Your surgery is scheduled for___WED 10/11______________.    Call [phone] between 2 pm and 5 pm __TUESDAY 10/10__________ to find out your arrival time for the day of surgery.    Report to SAME DAY SURGERY UNIT at _______am on the 2nd floor of the hospital.  Use the front entrance of the hospital before 530AM.      Important instructions:   Do not eat or drink after 12 midnight, including water.  It is okay to brush your teeth.  Do not have gum, candy or mints.     Take only these medications with a small swallow of water on the morning of your surgery.___BISOPROLOL__________               Prep instructions:     SHOWER   OTHER_____________     Please shower the night before and the morning of your surgery.            Do not wear make- up, including mascara.     You may wear deodorant only.      Do not wear powder, body lotion or cologne.     Do not wear any jewelry or have any metal on your body.          If you are going home on the same day of surgery, you must have arrangements for a ride home.  You will not be able to drive home if you were given anesthesia or sedation.          .     Stop taking Aspirin, Ibuprofen, Motrin and Aleve at least 3-5 days before your surgery. You may use Tylenol.     Stop taking fish oil and vitamin E for least 5 days before surgery.     Wear loose fitting clothes allowing for bandages.     Please leave money and valuables home.       You may bring your cell phone.     Call the doctor if fever or illness should occur before your surgery.    Call [phone] to contact us here at [facility] if needed.--VEGA  
with patient

## 2021-06-14 ENCOUNTER — HOSPITAL ENCOUNTER (OUTPATIENT)
Dept: RADIOLOGY | Facility: HOSPITAL | Age: 76
Discharge: HOME | End: 2021-06-14
Attending: UROLOGY
Payer: MEDICARE

## 2021-06-14 DIAGNOSIS — N39.0 RECURRENT UTI: ICD-10-CM

## 2021-06-14 DIAGNOSIS — R31.29 MICROSCOPIC HEMATURIA: ICD-10-CM

## 2021-06-14 PROCEDURE — 74178 CT ABD&PLV WO CNTR FLWD CNTR: CPT | Mod: 26,,, | Performed by: RADIOLOGY

## 2021-06-14 PROCEDURE — 74178 CT ABD&PLV WO CNTR FLWD CNTR: CPT | Mod: TC

## 2021-06-14 PROCEDURE — 74178 CT UROGRAM ABD PELVIS W WO: ICD-10-PCS | Mod: 26,,, | Performed by: RADIOLOGY

## 2021-06-14 PROCEDURE — 25500020 PHARM REV CODE 255: Performed by: UROLOGY

## 2021-06-14 RX ADMIN — IOHEXOL 125 ML: 350 INJECTION, SOLUTION INTRAVENOUS at 03:06

## 2021-06-16 ENCOUNTER — PROCEDURE VISIT (OUTPATIENT)
Dept: UROLOGY | Facility: CLINIC | Age: 76
End: 2021-06-16
Payer: MEDICARE

## 2021-06-16 VITALS
BODY MASS INDEX: 27.99 KG/M2 | HEART RATE: 75 BPM | WEIGHT: 168 LBS | TEMPERATURE: 98 F | HEIGHT: 65 IN | DIASTOLIC BLOOD PRESSURE: 81 MMHG | SYSTOLIC BLOOD PRESSURE: 170 MMHG

## 2021-06-16 DIAGNOSIS — R31.29 MICROSCOPIC HEMATURIA: ICD-10-CM

## 2021-06-16 DIAGNOSIS — R33.9 INCOMPLETE EMPTYING OF BLADDER: ICD-10-CM

## 2021-06-16 DIAGNOSIS — N39.0 RECURRENT UTI: ICD-10-CM

## 2021-06-16 PROCEDURE — 51728 PR COMPLEX CYSTOMETROGRAM VOIDING PRESSURE STUDIES: ICD-10-PCS | Mod: 26,S$GLB,, | Performed by: UROLOGY

## 2021-06-16 PROCEDURE — 52000 PR CYSTOURETHROSCOPY: ICD-10-PCS | Mod: 59,S$GLB,, | Performed by: UROLOGY

## 2021-06-16 PROCEDURE — 74455 X-RAY URETHRA/BLADDER: CPT | Mod: 26,S$GLB,, | Performed by: UROLOGY

## 2021-06-16 PROCEDURE — 51784 PR ANAL/URINARY MUSCLE STUDY: ICD-10-PCS | Mod: 26,51,S$GLB, | Performed by: UROLOGY

## 2021-06-16 PROCEDURE — 51741 ELECTRO-UROFLOWMETRY FIRST: CPT | Mod: 26,51,S$GLB, | Performed by: UROLOGY

## 2021-06-16 PROCEDURE — 51784 ANAL/URINARY MUSCLE STUDY: CPT | Mod: 26,51,S$GLB, | Performed by: UROLOGY

## 2021-06-16 PROCEDURE — 51741 PR UROFLOWMETRY, COMPLEX: ICD-10-PCS | Mod: 26,51,S$GLB, | Performed by: UROLOGY

## 2021-06-16 PROCEDURE — 51728 CYSTOMETROGRAM W/VP: CPT | Mod: 26,S$GLB,, | Performed by: UROLOGY

## 2021-06-16 PROCEDURE — 51797 INTRAABDOMINAL PRESSURE TEST: CPT | Mod: 26,S$GLB,, | Performed by: UROLOGY

## 2021-06-16 PROCEDURE — 74455 PR X-RAY URETHROCYSTOGRAM+VOIDING: ICD-10-PCS | Mod: 26,S$GLB,, | Performed by: UROLOGY

## 2021-06-16 PROCEDURE — 51797 PR VOIDING PRESS STUDY INTRA-ABDOMINAL VOID: ICD-10-PCS | Mod: 26,S$GLB,, | Performed by: UROLOGY

## 2021-06-16 PROCEDURE — 52000 CYSTOURETHROSCOPY: CPT | Mod: 59,S$GLB,, | Performed by: UROLOGY

## 2021-06-16 RX ORDER — LIDOCAINE HYDROCHLORIDE 20 MG/ML
JELLY TOPICAL ONCE
Status: COMPLETED | OUTPATIENT
Start: 2021-06-16 | End: 2021-06-16

## 2021-06-16 RX ORDER — DOXYCYCLINE HYCLATE 100 MG
100 TABLET ORAL ONCE
Status: COMPLETED | OUTPATIENT
Start: 2021-06-16 | End: 2021-06-16

## 2021-06-16 RX ADMIN — LIDOCAINE HYDROCHLORIDE: 20 JELLY TOPICAL at 12:06

## 2021-06-16 RX ADMIN — Medication 100 MG: at 12:06

## 2021-06-21 ENCOUNTER — TELEPHONE (OUTPATIENT)
Dept: UROLOGY | Facility: CLINIC | Age: 76
End: 2021-06-21

## 2021-06-30 ENCOUNTER — OFFICE VISIT (OUTPATIENT)
Dept: UROLOGY | Facility: CLINIC | Age: 76
End: 2021-06-30
Payer: MEDICARE

## 2021-06-30 VITALS
HEIGHT: 65 IN | DIASTOLIC BLOOD PRESSURE: 83 MMHG | SYSTOLIC BLOOD PRESSURE: 154 MMHG | BODY MASS INDEX: 27.56 KG/M2 | WEIGHT: 165.38 LBS | HEART RATE: 74 BPM

## 2021-06-30 DIAGNOSIS — N13.30 HYDRONEPHROSIS OF RIGHT KIDNEY: ICD-10-CM

## 2021-06-30 DIAGNOSIS — N39.0 RECURRENT UTI: ICD-10-CM

## 2021-06-30 DIAGNOSIS — R33.9 INCOMPLETE BLADDER EMPTYING: ICD-10-CM

## 2021-06-30 PROCEDURE — 3008F PR BODY MASS INDEX (BMI) DOCUMENTED: ICD-10-PCS | Mod: CPTII,S$GLB,, | Performed by: UROLOGY

## 2021-06-30 PROCEDURE — 1159F MED LIST DOCD IN RCRD: CPT | Mod: S$GLB,,, | Performed by: UROLOGY

## 2021-06-30 PROCEDURE — 1101F PT FALLS ASSESS-DOCD LE1/YR: CPT | Mod: CPTII,S$GLB,, | Performed by: UROLOGY

## 2021-06-30 PROCEDURE — 1159F PR MEDICATION LIST DOCUMENTED IN MEDICAL RECORD: ICD-10-PCS | Mod: S$GLB,,, | Performed by: UROLOGY

## 2021-06-30 PROCEDURE — 3288F PR FALLS RISK ASSESSMENT DOCUMENTED: ICD-10-PCS | Mod: CPTII,S$GLB,, | Performed by: UROLOGY

## 2021-06-30 PROCEDURE — 3008F BODY MASS INDEX DOCD: CPT | Mod: CPTII,S$GLB,, | Performed by: UROLOGY

## 2021-06-30 PROCEDURE — 1126F PR PAIN SEVERITY QUANTIFIED, NO PAIN PRESENT: ICD-10-PCS | Mod: S$GLB,,, | Performed by: UROLOGY

## 2021-06-30 PROCEDURE — 99999 PR PBB SHADOW E&M-EST. PATIENT-LVL III: ICD-10-PCS | Mod: PBBFAC,,, | Performed by: UROLOGY

## 2021-06-30 PROCEDURE — 99213 PR OFFICE/OUTPT VISIT, EST, LEVL III, 20-29 MIN: ICD-10-PCS | Mod: S$GLB,,, | Performed by: UROLOGY

## 2021-06-30 PROCEDURE — 3288F FALL RISK ASSESSMENT DOCD: CPT | Mod: CPTII,S$GLB,, | Performed by: UROLOGY

## 2021-06-30 PROCEDURE — 99999 PR PBB SHADOW E&M-EST. PATIENT-LVL III: CPT | Mod: PBBFAC,,, | Performed by: UROLOGY

## 2021-06-30 PROCEDURE — 1101F PR PT FALLS ASSESS DOC 0-1 FALLS W/OUT INJ PAST YR: ICD-10-PCS | Mod: CPTII,S$GLB,, | Performed by: UROLOGY

## 2021-06-30 PROCEDURE — 99213 OFFICE O/P EST LOW 20 MIN: CPT | Mod: S$GLB,,, | Performed by: UROLOGY

## 2021-06-30 PROCEDURE — 1126F AMNT PAIN NOTED NONE PRSNT: CPT | Mod: S$GLB,,, | Performed by: UROLOGY

## 2021-07-29 ENCOUNTER — OFFICE VISIT (OUTPATIENT)
Dept: UROLOGY | Facility: CLINIC | Age: 76
End: 2021-07-29
Payer: MEDICARE

## 2021-07-29 VITALS
BODY MASS INDEX: 27.95 KG/M2 | WEIGHT: 167.75 LBS | HEART RATE: 81 BPM | DIASTOLIC BLOOD PRESSURE: 83 MMHG | SYSTOLIC BLOOD PRESSURE: 135 MMHG | HEIGHT: 65 IN

## 2021-07-29 DIAGNOSIS — N39.0 RECURRENT UTI: ICD-10-CM

## 2021-07-29 DIAGNOSIS — N13.30 HYDRONEPHROSIS OF RIGHT KIDNEY: Primary | ICD-10-CM

## 2021-07-29 PROCEDURE — 99213 PR OFFICE/OUTPT VISIT, EST, LEVL III, 20-29 MIN: ICD-10-PCS | Mod: S$GLB,,, | Performed by: UROLOGY

## 2021-07-29 PROCEDURE — 1159F PR MEDICATION LIST DOCUMENTED IN MEDICAL RECORD: ICD-10-PCS | Mod: CPTII,S$GLB,, | Performed by: UROLOGY

## 2021-07-29 PROCEDURE — 3075F SYST BP GE 130 - 139MM HG: CPT | Mod: CPTII,S$GLB,, | Performed by: UROLOGY

## 2021-07-29 PROCEDURE — 1101F PR PT FALLS ASSESS DOC 0-1 FALLS W/OUT INJ PAST YR: ICD-10-PCS | Mod: CPTII,S$GLB,, | Performed by: UROLOGY

## 2021-07-29 PROCEDURE — 3008F PR BODY MASS INDEX (BMI) DOCUMENTED: ICD-10-PCS | Mod: CPTII,S$GLB,, | Performed by: UROLOGY

## 2021-07-29 PROCEDURE — 1126F AMNT PAIN NOTED NONE PRSNT: CPT | Mod: CPTII,S$GLB,, | Performed by: UROLOGY

## 2021-07-29 PROCEDURE — 3079F PR MOST RECENT DIASTOLIC BLOOD PRESSURE 80-89 MM HG: ICD-10-PCS | Mod: CPTII,S$GLB,, | Performed by: UROLOGY

## 2021-07-29 PROCEDURE — 99999 PR PBB SHADOW E&M-EST. PATIENT-LVL III: CPT | Mod: PBBFAC,,, | Performed by: UROLOGY

## 2021-07-29 PROCEDURE — 1101F PT FALLS ASSESS-DOCD LE1/YR: CPT | Mod: CPTII,S$GLB,, | Performed by: UROLOGY

## 2021-07-29 PROCEDURE — 51798 US URINE CAPACITY MEASURE: CPT | Mod: S$GLB,,, | Performed by: UROLOGY

## 2021-07-29 PROCEDURE — 1126F PR PAIN SEVERITY QUANTIFIED, NO PAIN PRESENT: ICD-10-PCS | Mod: CPTII,S$GLB,, | Performed by: UROLOGY

## 2021-07-29 PROCEDURE — 3288F FALL RISK ASSESSMENT DOCD: CPT | Mod: CPTII,S$GLB,, | Performed by: UROLOGY

## 2021-07-29 PROCEDURE — 3008F BODY MASS INDEX DOCD: CPT | Mod: CPTII,S$GLB,, | Performed by: UROLOGY

## 2021-07-29 PROCEDURE — 99999 PR PBB SHADOW E&M-EST. PATIENT-LVL III: ICD-10-PCS | Mod: PBBFAC,,, | Performed by: UROLOGY

## 2021-07-29 PROCEDURE — 3079F DIAST BP 80-89 MM HG: CPT | Mod: CPTII,S$GLB,, | Performed by: UROLOGY

## 2021-07-29 PROCEDURE — 99213 OFFICE O/P EST LOW 20 MIN: CPT | Mod: S$GLB,,, | Performed by: UROLOGY

## 2021-07-29 PROCEDURE — 1159F MED LIST DOCD IN RCRD: CPT | Mod: CPTII,S$GLB,, | Performed by: UROLOGY

## 2021-07-29 PROCEDURE — 51798 PR MEAS,POST-VOID RES,US,NON-IMAGING: ICD-10-PCS | Mod: S$GLB,,, | Performed by: UROLOGY

## 2021-07-29 PROCEDURE — 3288F PR FALLS RISK ASSESSMENT DOCUMENTED: ICD-10-PCS | Mod: CPTII,S$GLB,, | Performed by: UROLOGY

## 2021-07-29 PROCEDURE — 3075F PR MOST RECENT SYSTOLIC BLOOD PRESS GE 130-139MM HG: ICD-10-PCS | Mod: CPTII,S$GLB,, | Performed by: UROLOGY

## 2021-08-02 ENCOUNTER — TELEPHONE (OUTPATIENT)
Dept: UROLOGY | Facility: CLINIC | Age: 76
End: 2021-08-02

## 2021-08-03 DIAGNOSIS — R49.0 HOARSE: Primary | ICD-10-CM

## 2021-08-10 RX ORDER — RIVAROXABAN 20 MG/1
TABLET, FILM COATED ORAL
Qty: 30 TABLET | Refills: 6 | Status: SHIPPED | OUTPATIENT
Start: 2021-08-10 | End: 2022-03-21

## 2021-08-19 ENCOUNTER — HOSPITAL ENCOUNTER (OUTPATIENT)
Dept: RADIOLOGY | Facility: HOSPITAL | Age: 76
Discharge: HOME | End: 2021-08-19
Attending: UROLOGY
Payer: MEDICARE

## 2021-08-19 DIAGNOSIS — N13.30 HYDRONEPHROSIS OF RIGHT KIDNEY: ICD-10-CM

## 2021-08-19 PROCEDURE — 76770 US EXAM ABDO BACK WALL COMP: CPT | Mod: 26,,, | Performed by: RADIOLOGY

## 2021-08-19 PROCEDURE — 76770 US EXAM ABDO BACK WALL COMP: CPT | Mod: TC

## 2021-08-19 PROCEDURE — 76770 US KIDNEY: ICD-10-PCS | Mod: 26,,, | Performed by: RADIOLOGY

## 2021-10-06 ENCOUNTER — OFFICE VISIT (OUTPATIENT)
Dept: DERMATOLOGY | Facility: CLINIC | Age: 76
End: 2021-10-06
Payer: MEDICARE

## 2021-10-06 DIAGNOSIS — L72.9 CYST OF SKIN: ICD-10-CM

## 2021-10-06 DIAGNOSIS — L82.1 SEBORRHEIC KERATOSES: Primary | ICD-10-CM

## 2021-10-06 PROCEDURE — 99999 PR PBB SHADOW E&M-EST. PATIENT-LVL III: ICD-10-PCS | Mod: PBBFAC,,, | Performed by: DERMATOLOGY

## 2021-10-06 PROCEDURE — 1101F PR PT FALLS ASSESS DOC 0-1 FALLS W/OUT INJ PAST YR: ICD-10-PCS | Mod: CPTII,S$GLB,, | Performed by: DERMATOLOGY

## 2021-10-06 PROCEDURE — 1159F MED LIST DOCD IN RCRD: CPT | Mod: CPTII,S$GLB,, | Performed by: DERMATOLOGY

## 2021-10-06 PROCEDURE — 1125F AMNT PAIN NOTED PAIN PRSNT: CPT | Mod: CPTII,S$GLB,, | Performed by: DERMATOLOGY

## 2021-10-06 PROCEDURE — 1159F PR MEDICATION LIST DOCUMENTED IN MEDICAL RECORD: ICD-10-PCS | Mod: CPTII,S$GLB,, | Performed by: DERMATOLOGY

## 2021-10-06 PROCEDURE — 1101F PT FALLS ASSESS-DOCD LE1/YR: CPT | Mod: CPTII,S$GLB,, | Performed by: DERMATOLOGY

## 2021-10-06 PROCEDURE — 99203 PR OFFICE/OUTPT VISIT, NEW, LEVL III, 30-44 MIN: ICD-10-PCS | Mod: S$GLB,,, | Performed by: DERMATOLOGY

## 2021-10-06 PROCEDURE — 99203 OFFICE O/P NEW LOW 30 MIN: CPT | Mod: S$GLB,,, | Performed by: DERMATOLOGY

## 2021-10-06 PROCEDURE — 3288F FALL RISK ASSESSMENT DOCD: CPT | Mod: CPTII,S$GLB,, | Performed by: DERMATOLOGY

## 2021-10-06 PROCEDURE — 3288F PR FALLS RISK ASSESSMENT DOCUMENTED: ICD-10-PCS | Mod: CPTII,S$GLB,, | Performed by: DERMATOLOGY

## 2021-10-06 PROCEDURE — 1160F PR REVIEW ALL MEDS BY PRESCRIBER/CLIN PHARMACIST DOCUMENTED: ICD-10-PCS | Mod: CPTII,S$GLB,, | Performed by: DERMATOLOGY

## 2021-10-06 PROCEDURE — 99999 PR PBB SHADOW E&M-EST. PATIENT-LVL III: CPT | Mod: PBBFAC,,, | Performed by: DERMATOLOGY

## 2021-10-06 PROCEDURE — 1160F RVW MEDS BY RX/DR IN RCRD: CPT | Mod: CPTII,S$GLB,, | Performed by: DERMATOLOGY

## 2021-10-06 PROCEDURE — 1125F PR PAIN SEVERITY QUANTIFIED, PAIN PRESENT: ICD-10-PCS | Mod: CPTII,S$GLB,, | Performed by: DERMATOLOGY

## 2021-10-06 RX ORDER — CEPHALEXIN 500 MG/1
500 CAPSULE ORAL EVERY 8 HOURS
Qty: 21 CAPSULE | Refills: 0 | Status: SHIPPED | OUTPATIENT
Start: 2021-10-06 | End: 2021-10-13

## 2021-10-12 ENCOUNTER — TELEPHONE (OUTPATIENT)
Dept: DERMATOLOGY | Facility: CLINIC | Age: 76
End: 2021-10-12

## 2022-02-04 ENCOUNTER — TELEPHONE (OUTPATIENT)
Dept: SPORTS MEDICINE | Facility: CLINIC | Age: 77
End: 2022-02-04
Payer: MEDICARE

## 2022-02-04 NOTE — TELEPHONE ENCOUNTER
----- Message from Sarah Santana sent at 2/3/2022  4:47 PM CST -----  Contact: JIM WALKER [[id number]]  Please schedule patient to see Amy. Dr. Chamorro is booked out and work up by PA is appropriate first step for this patient.     Sarah Santana   Clinical Assistant to Dr. Brigido Chamorro   ----- Message -----  From: Dona Fierro  Sent: 2/3/2022   3:21 PM CST  To: [facility] Staff    Type: Patient Call Back    Who called:JIM WALKER [[id number]]    What is the request in detail: The patient would like a call in regards to a torn meniscus appt.     Can the clinic reply by [facility]?    Would the patient rather a call back or a response via [facility]?     Best call back number: [phone] (mobile)    Additional Information:

## 2022-02-11 ENCOUNTER — TELEPHONE (OUTPATIENT)
Dept: SPORTS MEDICINE | Facility: CLINIC | Age: 77
End: 2022-02-11
Payer: MEDICARE

## 2022-02-11 NOTE — TELEPHONE ENCOUNTER
Left a message for patient in regards to upcoming appointment to arrive 30 minutes prior for xrays.

## 2022-02-14 ENCOUNTER — OFFICE VISIT (OUTPATIENT)
Dept: SPORTS MEDICINE | Facility: CLINIC | Age: 77
End: 2022-02-14
Payer: MEDICARE

## 2022-02-14 ENCOUNTER — HOSPITAL ENCOUNTER (OUTPATIENT)
Dept: RADIOLOGY | Facility: HOSPITAL | Age: 77
Discharge: HOME | End: 2022-02-14
Attending: PHYSICIAN ASSISTANT
Payer: MEDICARE

## 2022-02-14 VITALS
WEIGHT: 168 LBS | DIASTOLIC BLOOD PRESSURE: 73 MMHG | SYSTOLIC BLOOD PRESSURE: 126 MMHG | HEART RATE: 64 BPM | HEIGHT: 65 IN | BODY MASS INDEX: 27.99 KG/M2

## 2022-02-14 DIAGNOSIS — M25.562 PAIN IN BOTH KNEES, UNSPECIFIED CHRONICITY: ICD-10-CM

## 2022-02-14 DIAGNOSIS — M25.561 ACUTE PAIN OF RIGHT KNEE: Primary | ICD-10-CM

## 2022-02-14 DIAGNOSIS — M25.561 PAIN IN BOTH KNEES, UNSPECIFIED CHRONICITY: ICD-10-CM

## 2022-02-14 DIAGNOSIS — M17.11 PRIMARY OSTEOARTHRITIS OF RIGHT KNEE: ICD-10-CM

## 2022-02-14 PROCEDURE — 1125F PR PAIN SEVERITY QUANTIFIED, PAIN PRESENT: ICD-10-PCS | Mod: CPTII,S$GLB,, | Performed by: PHYSICIAN ASSISTANT

## 2022-02-14 PROCEDURE — 3008F PR BODY MASS INDEX (BMI) DOCUMENTED: ICD-10-PCS | Mod: CPTII,S$GLB,, | Performed by: PHYSICIAN ASSISTANT

## 2022-02-14 PROCEDURE — 3288F PR FALLS RISK ASSESSMENT DOCUMENTED: ICD-10-PCS | Mod: CPTII,S$GLB,, | Performed by: PHYSICIAN ASSISTANT

## 2022-02-14 PROCEDURE — 1159F MED LIST DOCD IN RCRD: CPT | Mod: CPTII,S$GLB,, | Performed by: PHYSICIAN ASSISTANT

## 2022-02-14 PROCEDURE — 73564 XR KNEE ORTHO BILAT WITH FLEXION: ICD-10-PCS | Mod: 26,50,, | Performed by: RADIOLOGY

## 2022-02-14 PROCEDURE — 1160F PR REVIEW ALL MEDS BY PRESCRIBER/CLIN PHARMACIST DOCUMENTED: ICD-10-PCS | Mod: CPTII,S$GLB,, | Performed by: PHYSICIAN ASSISTANT

## 2022-02-14 PROCEDURE — 99204 OFFICE O/P NEW MOD 45 MIN: CPT | Mod: S$GLB,,, | Performed by: PHYSICIAN ASSISTANT

## 2022-02-14 PROCEDURE — 1100F PTFALLS ASSESS-DOCD GE2>/YR: CPT | Mod: CPTII,S$GLB,, | Performed by: PHYSICIAN ASSISTANT

## 2022-02-14 PROCEDURE — 1160F RVW MEDS BY RX/DR IN RCRD: CPT | Mod: CPTII,S$GLB,, | Performed by: PHYSICIAN ASSISTANT

## 2022-02-14 PROCEDURE — 1125F AMNT PAIN NOTED PAIN PRSNT: CPT | Mod: CPTII,S$GLB,, | Performed by: PHYSICIAN ASSISTANT

## 2022-02-14 PROCEDURE — 99999 PR PBB SHADOW E&M-EST. PATIENT-LVL III: CPT | Mod: PBBFAC,,, | Performed by: PHYSICIAN ASSISTANT

## 2022-02-14 PROCEDURE — 3078F DIAST BP <80 MM HG: CPT | Mod: CPTII,S$GLB,, | Performed by: PHYSICIAN ASSISTANT

## 2022-02-14 PROCEDURE — 1159F PR MEDICATION LIST DOCUMENTED IN MEDICAL RECORD: ICD-10-PCS | Mod: CPTII,S$GLB,, | Performed by: PHYSICIAN ASSISTANT

## 2022-02-14 PROCEDURE — 1100F PR PT FALLS ASSESS DOC 2+ FALLS/FALL W/INJURY/YR: ICD-10-PCS | Mod: CPTII,S$GLB,, | Performed by: PHYSICIAN ASSISTANT

## 2022-02-14 PROCEDURE — 3008F BODY MASS INDEX DOCD: CPT | Mod: CPTII,S$GLB,, | Performed by: PHYSICIAN ASSISTANT

## 2022-02-14 PROCEDURE — 99204 PR OFFICE/OUTPT VISIT, NEW, LEVL IV, 45-59 MIN: ICD-10-PCS | Mod: S$GLB,,, | Performed by: PHYSICIAN ASSISTANT

## 2022-02-14 PROCEDURE — 3074F SYST BP LT 130 MM HG: CPT | Mod: CPTII,S$GLB,, | Performed by: PHYSICIAN ASSISTANT

## 2022-02-14 PROCEDURE — 73564 X-RAY EXAM KNEE 4 OR MORE: CPT | Mod: 26,50,, | Performed by: RADIOLOGY

## 2022-02-14 PROCEDURE — 3288F FALL RISK ASSESSMENT DOCD: CPT | Mod: CPTII,S$GLB,, | Performed by: PHYSICIAN ASSISTANT

## 2022-02-14 PROCEDURE — 99999 PR PBB SHADOW E&M-EST. PATIENT-LVL III: ICD-10-PCS | Mod: PBBFAC,,, | Performed by: PHYSICIAN ASSISTANT

## 2022-02-14 PROCEDURE — 3078F PR MOST RECENT DIASTOLIC BLOOD PRESSURE < 80 MM HG: ICD-10-PCS | Mod: CPTII,S$GLB,, | Performed by: PHYSICIAN ASSISTANT

## 2022-02-14 PROCEDURE — 3074F PR MOST RECENT SYSTOLIC BLOOD PRESSURE < 130 MM HG: ICD-10-PCS | Mod: CPTII,S$GLB,, | Performed by: PHYSICIAN ASSISTANT

## 2022-02-14 PROCEDURE — 73564 X-RAY EXAM KNEE 4 OR MORE: CPT | Mod: TC,50

## 2022-02-14 NOTE — PROGRESS NOTES
Subjective:          Chief Complaint: Rachel Silver is a 76 y.o. female who had concerns including Pain of the Right Knee.    HPI   Patient presents to clinic with right knee pain x 1 week. Patient states that she was walking and tripped on her wood floor, but she did not fall. Pain is located along the medial aspect of her knee. Pain increases with pivoting and twisting movements. Swelling increases with increased activity. Pain at rest is 0/10. Pain at its worst is 8/10. She has been taking Ibuprofen as needed. She is here today to discuss treatment options. Denies any previous history of injections in her knee.      Previous history of right knee medial meniscus repair at [facility]    Review of Systems   Constitutional: Negative. Negative for chills, fever, weight gain and weight loss.   HENT: Negative for congestion and sore throat.    Eyes: Negative for blurred vision and double vision.   Cardiovascular: Negative for chest pain, leg swelling and palpitations.   Respiratory: Negative for cough and shortness of breath.    Hematologic/Lymphatic: Does not bruise/bleed easily.   Skin: Negative for itching, poor wound healing and rash.   Musculoskeletal: Positive for joint pain, joint swelling and stiffness. Negative for back pain, muscle weakness and myalgias.   Gastrointestinal: Negative for abdominal pain, constipation, diarrhea, nausea and vomiting.   Genitourinary: Negative.  Negative for frequency and hematuria.   Neurological: Negative for dizziness, headaches, numbness, paresthesias and sensory change.   Psychiatric/Behavioral: Negative for altered mental status and depression. The patient is not nervous/anxious.    Allergic/Immunologic: Negative for hives.                   Objective:        General: Rachel is well-developed, well-nourished, appears stated age, in no acute distress, alert and oriented to time, place and person.     General    Vitals reviewed.  Constitutional: She is oriented to person,  place, and time. She appears well-developed and well-nourished. No distress.   HENT:   Head: Normocephalic and atraumatic.   Eyes: EOM are normal.   Cardiovascular: Normal rate and regular rhythm.    Pulmonary/Chest: Effort normal. No respiratory distress.   Neurological: She is alert and oriented to person, place, and time. She has normal reflexes. No cranial nerve deficit. Coordination normal.   Psychiatric: She has a normal mood and affect. Her behavior is normal. Judgment and thought content normal.     General Musculoskeletal Exam   Gait: abnormal and antalgic       Right Knee Exam     Inspection   Erythema: absent  Scars: absent  Swelling: absent  Effusion: present  Deformity: absent  Bruising: absent    Tenderness   The patient is tender to palpation of the medial joint line.    Crepitus   The patient has crepitus of the patella.    Range of Motion   Extension:  0 normal   Flexion:  130 normal     Tests   Meniscus   Amari:  Medial - positive Lateral - negative  Ligament Examination Lachman: normal (-1 to 2mm) PCL-Posterior Drawer: normal (0 to 2mm)     MCL - Valgus: normal (0 to 2mm)  LCL - Varus: normalPivot Shift: normal (Equal)Reverse Pivot Shift: normal (Equal)  Posterolateral Corner: stable  Patella   Passive Patellar Tilt: neutral  Patellar Tracking: normal  Patellar Glide (quadrants): Lateral - 1   Medial - 2  Patellar Grind: negative    Other   Sensation: normal    Left Knee Exam     Inspection   Erythema: absent  Scars: absent  Swelling: absent  Effusion: absent  Deformity: absent  Bruising: absent    Tenderness   The patient is experiencing no tenderness.     Crepitus   The patient has crepitus of the patella.    Range of Motion   Extension:  0 normal   Flexion:  140 normal     Tests   Meniscus   Amari:  Medial - negative Lateral - negative  Stability Lachman: normal (-1 to 2mm) PCL-Posterior Drawer: normal (0 to 2mm)  MCL - Valgus: normal (0 to 2mm)  LCL - Varus: normal (0 to 2mm)Pivot Shift:  normal (Equal)Reverse Pivot Shift: normal (Equal)  Posterolateral Corner: stable  Patella   Passive Patellar Tilt: neutral  Patellar Tracking: normal  Patellar Glide (Quadrants): Lateral - 1 Medial - 2  Patellar Grind: negative    Other   Sensation: normal    Muscle Strength   Right Lower Extremity   Hip Abduction: 5/5   Quadriceps:  5/5   Hamstrin/5   Left Lower Extremity   Hip Abduction: 5/5   Quadriceps:  5/5   Hamstrin/5     Reflexes     Left Side  Achilles:  2+  Quadriceps:  2+    Right Side   Achilles:  2+  Quadriceps:  2+    Vascular Exam     Right Pulses  Dorsalis Pedis:      2+  Posterior Tibial:      2+        Left Pulses  Dorsalis Pedis:      2+  Posterior Tibial:      2+          RADIOGRAPHS:  Bilateral knees:  FINDINGS:  Four views knees bilateral.     Right knee: There is DJD.  No fracture dislocation bone destruction seen.     Left: There is DJD.  No fracture dislocation bone destruction seen.        Assessment:       Encounter Diagnoses   Name Primary?    Acute pain of right knee Yes    Primary osteoarthritis of right knee           Plan:       1. I made the decision to obtain old records of the patient including previous notes and imaging. New imaging was ordered today of the extremity or extremities evaluated. I independently reviewed and interpreted the radiographs and/or MRIs today as well as prior imaging. Reviewed radiographs with patient in detail.    2. Ice/elevate/compression    3. Recommended voltaren gel     4. Discussed visco-supplementation-Synvisc one with patient in detail. Pamphlet provided. Patient will think about it and let me know if she is interested in moving forward with this.     5. RTC prn.                      Patient questionnaires may have been collected.

## 2022-02-15 DIAGNOSIS — E78.5 HYPERLIPIDEMIA, UNSPECIFIED HYPERLIPIDEMIA TYPE: ICD-10-CM

## 2022-02-15 DIAGNOSIS — R31.9 HEMATURIA, UNSPECIFIED TYPE: Primary | ICD-10-CM

## 2022-03-07 ENCOUNTER — OFFICE VISIT (OUTPATIENT)
Dept: OTOLARYNGOLOGY | Facility: CLINIC | Age: 77
End: 2022-03-07
Payer: MEDICARE

## 2022-03-07 VITALS — BODY MASS INDEX: 27.99 KG/M2 | WEIGHT: 168 LBS | HEIGHT: 65 IN

## 2022-03-07 DIAGNOSIS — R49.0 DYSPHONIA: Primary | ICD-10-CM

## 2022-03-07 DIAGNOSIS — Z11.52 ENCOUNTER FOR SCREENING FOR COVID-19: ICD-10-CM

## 2022-03-07 DIAGNOSIS — D02.0 SQUAMOUS CELL CARCINOMA IN SITU (SCCIS) OF TRUE VOCAL CORD: ICD-10-CM

## 2022-03-07 DIAGNOSIS — K21.9 LARYNGOPHARYNGEAL REFLUX (LPR): ICD-10-CM

## 2022-03-07 DIAGNOSIS — R09.89 THROAT CLEARING: ICD-10-CM

## 2022-03-07 LAB
CTP QC/QA: YES
SARS-COV-2 AG RESP QL IA.RAPID: NEGATIVE

## 2022-03-07 PROCEDURE — 31575 PR LARYNGOSCOPY, FLEXIBLE; DIAGNOSTIC: ICD-10-PCS | Mod: S$GLB,,, | Performed by: OTOLARYNGOLOGY

## 2022-03-07 PROCEDURE — 1101F PR PT FALLS ASSESS DOC 0-1 FALLS W/OUT INJ PAST YR: ICD-10-PCS | Mod: CPTII,S$GLB,, | Performed by: OTOLARYNGOLOGY

## 2022-03-07 PROCEDURE — 3288F FALL RISK ASSESSMENT DOCD: CPT | Mod: CPTII,S$GLB,, | Performed by: OTOLARYNGOLOGY

## 2022-03-07 PROCEDURE — 1101F PT FALLS ASSESS-DOCD LE1/YR: CPT | Mod: CPTII,S$GLB,, | Performed by: OTOLARYNGOLOGY

## 2022-03-07 PROCEDURE — 1126F PR PAIN SEVERITY QUANTIFIED, NO PAIN PRESENT: ICD-10-PCS | Mod: CPTII,S$GLB,, | Performed by: OTOLARYNGOLOGY

## 2022-03-07 PROCEDURE — 1159F PR MEDICATION LIST DOCUMENTED IN MEDICAL RECORD: ICD-10-PCS | Mod: CPTII,S$GLB,, | Performed by: OTOLARYNGOLOGY

## 2022-03-07 PROCEDURE — 99214 OFFICE O/P EST MOD 30 MIN: CPT | Mod: 25,S$GLB,, | Performed by: OTOLARYNGOLOGY

## 2022-03-07 PROCEDURE — 99214 PR OFFICE/OUTPT VISIT, EST, LEVL IV, 30-39 MIN: ICD-10-PCS | Mod: 25,S$GLB,, | Performed by: OTOLARYNGOLOGY

## 2022-03-07 PROCEDURE — 87811 SARS CORONAVIRUS 2 ANTIGEN POCT, MANUAL READ: ICD-10-PCS | Mod: S$GLB,,, | Performed by: OTOLARYNGOLOGY

## 2022-03-07 PROCEDURE — 3288F PR FALLS RISK ASSESSMENT DOCUMENTED: ICD-10-PCS | Mod: CPTII,S$GLB,, | Performed by: OTOLARYNGOLOGY

## 2022-03-07 PROCEDURE — 31575 DIAGNOSTIC LARYNGOSCOPY: CPT | Mod: S$GLB,,, | Performed by: OTOLARYNGOLOGY

## 2022-03-07 PROCEDURE — 87811 SARS-COV-2 COVID19 W/OPTIC: CPT | Mod: S$GLB,,, | Performed by: OTOLARYNGOLOGY

## 2022-03-07 PROCEDURE — 3008F BODY MASS INDEX DOCD: CPT | Mod: CPTII,S$GLB,, | Performed by: OTOLARYNGOLOGY

## 2022-03-07 PROCEDURE — 1126F AMNT PAIN NOTED NONE PRSNT: CPT | Mod: CPTII,S$GLB,, | Performed by: OTOLARYNGOLOGY

## 2022-03-07 PROCEDURE — 3008F PR BODY MASS INDEX (BMI) DOCUMENTED: ICD-10-PCS | Mod: CPTII,S$GLB,, | Performed by: OTOLARYNGOLOGY

## 2022-03-07 PROCEDURE — 1159F MED LIST DOCD IN RCRD: CPT | Mod: CPTII,S$GLB,, | Performed by: OTOLARYNGOLOGY

## 2022-03-07 RX ORDER — OMEPRAZOLE 40 MG/1
40 CAPSULE, DELAYED RELEASE ORAL
Qty: 60 CAPSULE | Refills: 2 | Status: SHIPPED | OUTPATIENT
Start: 2022-03-07 | End: 2023-02-08

## 2022-03-07 NOTE — PROGRESS NOTES
OTOLARYNGOLOGY CLINIC NOTE  Date:  03/07/2022     Chief complaint:  Chief Complaint   Patient presents with    Sinus Problem     Having a drip that is making her constantly clear her throat       History of Present Illness  Rachel Sivler is a 76 y.o. female  presenting today for a followup of hoarseness. She was lost to f/u. Last seen 2-2021. She underwent DL biopsy which was concerning for SCCa in situ, could not rule out invasive scca (6-11-20) who underwent microlaryngeal excision of vocal cord lesion with CO2 laser on 7-9-20 of her left true vocal fold. CO2 laser also used to vaporize resection bed. Pathology from cord excision showed moderate dysplasia with some crush artifact and sectioning artifact but did not appear to have any residual scca in situ.     No throat pian   Has a constant nasal drip and constant clearing   When drip is not bad, her voice gets worse    Has been having some heartburn recently  Sometimes has a productive cough of whitish phlegm ; has been sneezing   Sometimes nose gets really dry   Not on any acid meds     Has some nasal congestion   Never has a sore throat   Can't blow phlegm out through the nose     Has not been doing any nasal sprays, does not like nasal sprays    Past Medical History  Past Medical History:   Diagnosis Date    Atrial fibrillation     Cataracts, bilateral     Hoarse     Hypertension     UTI (urinary tract infection)     Vaginal delivery     x3        Past Surgical History  Past Surgical History:   Procedure Laterality Date    HYSTERECTOMY      LARYNGOSCOPY N/A 6/11/2020    Procedure: LARYNGOSCOPY;  Surgeon: Yesenia Durham MD;  Location: [facility] OR;  Service: ENT;  Laterality: N/A;  RN PRE OP, COVID NEGATIVE-- 6-8-2020 [place]  HAS-Cardiac Clearance today 6-8 [facility]    MICROLARYNGOSCOPY N/A 7/9/2020    Procedure: MICROLARYNGOSCOPY;  Surgeon: Yesenia Durham MD;  Location: [facility] OR;  Service: ENT;  Laterality: N/A;    Microlaryngoscopy with biopsy vocal  cords      TONSILLECTOMY      torn meniscus      VOCAL FOLD LESION EXCISION  7/9/2020    Procedure: EXCISION, LESION, VOCAL CORD, USING LASER;  Surgeon: Yesenia Durham MD;  Location: [facility];  Service: ENT;;        Medications  Current Outpatient Medications on File Prior to Visit   Medication Sig Dispense Refill    diltiaZEM HCl (CARDIZEM LA) 240 mg 24 hr tablet Take 1 tablet (240 mg total) by mouth once daily. 90 tablet 3    flecainide (TAMBOCOR) 100 MG Tab TAKE 1 TABLET BY MOUTH EVERY 12 HOURS 180 tablet 3    hydroCHLOROthiazide (HYDRODIURIL) 25 MG tablet Take 1 tablet by mouth once daily 90 tablet 3    rosuvastatin (CRESTOR) 40 MG Tab TAKE 1 TABLET BY MOUTH ONCE DAILY IN THE EVENING 90 tablet 0    XARELTO 20 mg Tab Take 1 tablet by mouth once daily 30 tablet 6    ammonium lactate 12 % Crea Apply 1 g topically once daily. 140 g 1    ciprofloxacin HCl (CIPRO) 500 MG tablet Take 500 mg by mouth as needed.      estradioL (ESTRACE) 0.01 % (0.1 mg/gram) vaginal cream Place 1 g vaginally 3 (three) times a week. Place by fingertip application before bedtime three times a week (Monday, Wednesday, Friday) 45 g 3     Current Facility-Administered Medications on File Prior to Visit   Medication Dose Route Frequency Provider Last Rate Last Admin    sodium chloride 0.9% flush 10 mL  10 mL Intravenous PRN Yesenia Durham MD           Review of Systems  Review of Systems   Constitutional: Negative.    Eyes: Negative.    Respiratory: Positive for cough.    Cardiovascular: Negative.    Gastrointestinal: Negative.    Genitourinary: Negative.    Skin: Negative.    Neurological: Positive for headaches.   Psychiatric/Behavioral: Negative.     Answers for HPI/ROS submitted by the patient on 3/6/2022  sinus pressure : Yes  Sinus infection(s)?: Yes  postnasal drip: Yes  Seasonal Allergies?: Yes        Social History   reports that she quit smoking about 34 years ago. She smoked 1.50 packs per day. She has never used  "smokeless tobacco. She reports current alcohol use. She reports that she does not use drugs.     Family History  Family History   Problem Relation Age of Onset    Heart failure Mother     Stroke Father     Stroke Brother     Heart disease Maternal Aunt     Heart disease Maternal Uncle     Heart disease Maternal Aunt     Heart disease Maternal Aunt     Heart disease Maternal Uncle         Physical Exam   There were no vitals filed for this visit. Body mass index is 27.96 kg/m².  Weight: 76.2 kg (168 lb)   Height: 5' 5" (165.1 cm)     GENERAL: no acute distress.  HEAD: normocephalic.   EYES: lids and lashes normal. Pupils equal . No proptosis  EARS: external ear without lesion, normal pinna shape and position.    NOSE: external nose without significant bony abnormality  ORAL CAVITY/OROPHARYNX: tongue midline and mobile. Symmetric palate rise. Uvula midline.   NECK: trachea midline.   LYMPH NODES:No cervical lymphadenopathy.  RESPIRATORY: no stridor, no stertor. Voice raspy. Respirations nonlabored.  NEURO: alert, responds to questions appropriately.  Facial movement symmetric at rest   PSYCH:mood appropriate    PROCEDURE NOTE  NAME OF PROCEDURE: Flexible Laryngoscopy, diagnostic  INDICATIONS: gag reflex precludes mirror exam, history of scca in situ left vocal fold ,dysphonia   FINDINGS: mild erythema of vocal fold appears a bit more than prior exam    Consent: After procedure was explained in detail and all questions answered, verbal consent was obtained for performing flexible laryngoscopy.  Anesthesia: topical 4% lidocaine and neosynephrine  Procedure: With patient in seated position, the scope was inserted into the bilateral nasal passageway and advanced atraumatically into the nasopharynx to examine the following structures:    Nasopharynx: no mass or lesion noted in nasopharynx.   Oropharynx: base of tongue without  mass or ulceration. Lingual tonsils normal in appearance  Hypopharynx: posterior " pharyngeal wall without mass or lesion. No pooling of secretions. Pyriform sinuses visible without mass or lesion  Larynx: epiglottis normal without lesion. False vocal folds without edema/erythema/lesion. True vocal folds mobile , left vocal fold with mild erythema and subtle fullness potentially- no overt lesion but concerning appearnce Mild interarytenoid edema no erythema . Postcricoid region with mild edema no lesion   Subglottis: visualized portion of subglottis normal in appearance    After examination performed, the scope was removed atraumatically . The patient tolerated the procedure well. Photodocumentation obtained with representative images below, all images and/or videos uploaded in media section of epic.                  Imaging:  The patient does not have any new imaging of the head and neck since last visit.     Labs:  CBC  Recent Labs   Lab 06/08/20  0940   WBC 7.37   Hemoglobin 13.1   Hematocrit 37.7   MCV 85   Platelets 215     BMP  Recent Labs   Lab 07/07/20  1020 05/20/21  1205   Glucose 99 93   Sodium 140 142   Potassium 3.9 3.5   Chloride 104 104   CO2 30 H 31 H   BUN 15 13   Creatinine 0.9 0.9   Calcium 9.5 10.0     COAGS        Assessment  1. Encounter for screening for COVID-19  - SARS Coronavirus 2 Antigen, POCT Manual Read    2. 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 Dysphonia    4. Laryngopharyngeal reflux (LPR)    5. Throat clearing       Plan:  Discussed plan of care with patient in detail and all questions answered. Patient reported understanding of plan of care.   mild erythema of vocal fold appears a bit more than prior exam, will do close follow up exam . Discussed that likely will need to do DL with  Possible biopsy but will do higher dose acid reflux regimen for a couple of weeks and have her come back for repeat scope   Pectin lozenges, humidifier, voice hygeine    Please be aware that this note has been generated with the assistance of Issa  voice-to-text.  Please excuse any spelling or grammatical errors.

## 2022-03-07 NOTE — PATIENT INSTRUCTIONS
Can try sugar free lozenges with pectin ,  such as halls fruit breezers    Clearing your throat leads to more swelling in the voice box.  This will worsen your symptoms.  You should try to minimize throat clearing as much as possible.    Sleep with bedside humidifier    Avoid mouthwash with alcohol such as listerine. You should use biotene mouth wash    You should aim to drink 2 to 3 liters of water daily if you are drinking one cup of coffee.  If you have trouble drinking water, you can cut back or cut out caffeine.

## 2022-04-04 ENCOUNTER — PATIENT MESSAGE (OUTPATIENT)
Dept: ELECTROPHYSIOLOGY | Facility: CLINIC | Age: 77
End: 2022-04-04
Payer: MEDICARE

## 2022-04-29 ENCOUNTER — HOSPITAL ENCOUNTER (OUTPATIENT)
Dept: CARDIOLOGY | Facility: HOSPITAL | Age: 77
Discharge: HOME | End: 2022-04-29
Attending: INTERNAL MEDICINE
Payer: MEDICARE

## 2022-04-29 VITALS
WEIGHT: 168 LBS | DIASTOLIC BLOOD PRESSURE: 78 MMHG | HEART RATE: 70 BPM | HEIGHT: 65 IN | SYSTOLIC BLOOD PRESSURE: 140 MMHG | BODY MASS INDEX: 27.99 KG/M2

## 2022-04-29 DIAGNOSIS — I48.0 PAF (PAROXYSMAL ATRIAL FIBRILLATION): ICD-10-CM

## 2022-04-29 DIAGNOSIS — I10 ESSENTIAL HYPERTENSION: ICD-10-CM

## 2022-04-29 LAB
ASCENDING AORTA: 3.19 CM
AV INDEX (PROSTH): 0.7
AV MEAN GRADIENT: 4 MMHG
AV PEAK GRADIENT: 7 MMHG
AV VALVE AREA: 2.78 CM2
AV VELOCITY RATIO: 0.69
BSA FOR ECHO PROCEDURE: 1.87 M2
CV ECHO LV RWT: 0.32 CM
DOP CALC AO PEAK VEL: 1.34 M/S
DOP CALC AO VTI: 26.61 CM
DOP CALC LVOT AREA: 3.9 CM2
DOP CALC LVOT DIAMETER: 2.24 CM
DOP CALC LVOT PEAK VEL: 0.93 M/S
DOP CALC LVOT STROKE VOLUME: 73.89 CM3
DOP CALCLVOT PEAK VEL VTI: 18.76 CM
E WAVE DECELERATION TIME: 302.48 MSEC
E/A RATIO: 0.68
E/E' RATIO: 13 M/S
ECHO LV POSTERIOR WALL: 0.76 CM (ref 0.6–1.1)
EJECTION FRACTION: 60 %
FRACTIONAL SHORTENING: 42 % (ref 28–44)
INTERVENTRICULAR SEPTUM: 0.73 CM (ref 0.6–1.1)
LA MAJOR: 4.67 CM
LA MINOR: 4.74 CM
LA WIDTH: 3.46 CM
LEFT ATRIUM SIZE: 4.15 CM
LEFT ATRIUM VOLUME INDEX MOD: 27.2 ML/M2
LEFT ATRIUM VOLUME INDEX: 31.2 ML/M2
LEFT ATRIUM VOLUME MOD: 50 CM3
LEFT ATRIUM VOLUME: 57.42 CM3
LEFT INTERNAL DIMENSION IN SYSTOLE: 2.81 CM (ref 2.1–4)
LEFT VENTRICLE DIASTOLIC VOLUME INDEX: 58.61 ML/M2
LEFT VENTRICLE DIASTOLIC VOLUME: 107.85 ML
LEFT VENTRICLE MASS INDEX: 63 G/M2
LEFT VENTRICLE SYSTOLIC VOLUME INDEX: 16.2 ML/M2
LEFT VENTRICLE SYSTOLIC VOLUME: 29.72 ML
LEFT VENTRICULAR INTERNAL DIMENSION IN DIASTOLE: 4.81 CM (ref 3.5–6)
LEFT VENTRICULAR MASS: 116.05 G
LV LATERAL E/E' RATIO: 10.4 M/S
LV SEPTAL E/E' RATIO: 17.33 M/S
MV PEAK A VEL: 0.76 M/S
MV PEAK E VEL: 0.52 M/S
MV STENOSIS PRESSURE HALF TIME: 87.72 MS
MV VALVE AREA P 1/2 METHOD: 2.51 CM2
PISA TR MAX VEL: 2.27 M/S
RA MAJOR: 4.43 CM
RA PRESSURE: 3 MMHG
RA WIDTH: 3.03 CM
RIGHT VENTRICULAR END-DIASTOLIC DIMENSION: 3.5 CM
RV TISSUE DOPPLER FREE WALL SYSTOLIC VELOCITY 1 (APICAL 4 CHAMBER VIEW): 11.34 CM/S
SINUS: 2.98 CM
STJ: 2.77 CM
TDI LATERAL: 0.05 M/S
TDI SEPTAL: 0.03 M/S
TDI: 0.04 M/S
TR MAX PG: 21 MMHG
TRICUSPID ANNULAR PLANE SYSTOLIC EXCURSION: 1.59 CM
TV REST PULMONARY ARTERY PRESSURE: 24 MMHG

## 2022-04-29 PROCEDURE — 93306 TTE W/DOPPLER COMPLETE: CPT

## 2022-04-29 PROCEDURE — 93306 TTE W/DOPPLER COMPLETE: CPT | Mod: 26,,, | Performed by: INTERNAL MEDICINE

## 2022-04-29 PROCEDURE — 93306 ECHO (CUPID ONLY): ICD-10-PCS | Mod: 26,,, | Performed by: INTERNAL MEDICINE

## 2022-05-19 ENCOUNTER — OFFICE VISIT (OUTPATIENT)
Dept: CARDIOLOGY | Facility: CLINIC | Age: 77
End: 2022-05-19
Payer: MEDICARE

## 2022-05-19 VITALS
SYSTOLIC BLOOD PRESSURE: 122 MMHG | WEIGHT: 162.06 LBS | HEIGHT: 65 IN | HEART RATE: 60 BPM | BODY MASS INDEX: 27 KG/M2 | DIASTOLIC BLOOD PRESSURE: 59 MMHG | OXYGEN SATURATION: 95 %

## 2022-05-19 DIAGNOSIS — I10 ESSENTIAL HYPERTENSION: ICD-10-CM

## 2022-05-19 DIAGNOSIS — E78.5 HYPERLIPIDEMIA, UNSPECIFIED HYPERLIPIDEMIA TYPE: ICD-10-CM

## 2022-05-19 DIAGNOSIS — I48.0 PAF (PAROXYSMAL ATRIAL FIBRILLATION): Primary | ICD-10-CM

## 2022-05-19 PROCEDURE — 99213 PR OFFICE/OUTPT VISIT, EST, LEVL III, 20-29 MIN: ICD-10-PCS | Mod: GC,S$GLB,, | Performed by: INTERNAL MEDICINE

## 2022-05-19 PROCEDURE — 99999 PR PBB SHADOW E&M-EST. PATIENT-LVL IV: ICD-10-PCS | Mod: PBBFAC,GC,, | Performed by: INTERNAL MEDICINE

## 2022-05-19 PROCEDURE — 99213 OFFICE O/P EST LOW 20 MIN: CPT | Mod: GC,S$GLB,, | Performed by: INTERNAL MEDICINE

## 2022-05-19 PROCEDURE — 99999 PR PBB SHADOW E&M-EST. PATIENT-LVL IV: CPT | Mod: PBBFAC,GC,, | Performed by: INTERNAL MEDICINE

## 2022-05-19 NOTE — PROGRESS NOTES
Cardiology Clinic Note  Reason for Visit: Follow-up on hypertension    HPI:   Ms. Rachel Silver is a 76 y.o. woman with pAF (on flecainide and xarelto), HTN, HLD. Patient of Dr Graff. Presents today for one year follow up. She is doing well. Not exercising but active at home and with grandchildren. Denies symptoms with activity. Afib well controlled on flecainide. No issues with bleeding. Stopped taking rosuvastatin for 2 months and her last lipid panel 4/2022 (available on [facility] Everywhere) showed LDL of 160 from 50. She is back on it and we discussed the importance of taking it.     ROS:    Review of Systems   Constitutional: Negative for chills and fever.   Cardiovascular: Negative for chest pain, dyspnea on exertion, palpitations and syncope.   Respiratory: Negative for cough and sleep disturbances due to breathing.    Musculoskeletal: Negative for back pain.   Gastrointestinal: Negative for abdominal pain, nausea and vomiting.   Neurological: Negative for dizziness and focal weakness.   Psychiatric/Behavioral: Negative for altered mental status.     PMH:     Past Medical History:   Diagnosis Date    Atrial fibrillation     Cataracts, bilateral     Hoarse     Hypertension     UTI (urinary tract infection)     Vaginal delivery     x3     Past Surgical History:   Procedure Laterality Date    HYSTERECTOMY      LARYNGOSCOPY N/A 6/11/2020    Procedure: LARYNGOSCOPY;  Surgeon: Yesenia Durham MD;  Location: [facility] OR;  Service: ENT;  Laterality: N/A;  RN PRE OP, COVID NEGATIVE-- 6-8-2020 [place]  HAS-Cardiac Clearance today 6-8 [facility]    MICROLARYNGOSCOPY N/A 7/9/2020    Procedure: MICROLARYNGOSCOPY;  Surgeon: Yesenia Durham MD;  Location: [facility] OR;  Service: ENT;  Laterality: N/A;    Microlaryngoscopy with biopsy vocal cords      TONSILLECTOMY      torn meniscus      VOCAL FOLD LESION EXCISION  7/9/2020    Procedure: EXCISION, LESION, VOCAL CORD, USING LASER;  Surgeon: Yesenia Durham MD;   Location: [facility] OR;  Service: ENT;;     Allergies:   Review of patient's allergies indicates:  No Known Allergies  Medications:     Current Outpatient Medications on File Prior to Visit   Medication Sig Dispense Refill    diltiaZEM HCl (CARDIZEM LA) 240 mg 24 hr tablet Take 1 tablet (240 mg total) by mouth once daily. 90 tablet 3    flecainide (TAMBOCOR) 100 MG Tab TAKE 1 TABLET BY MOUTH EVERY 12 HOURS 180 tablet 3    hydroCHLOROthiazide (HYDRODIURIL) 25 MG tablet Take 1 tablet by mouth once daily 90 tablet 3    omeprazole (PRILOSEC) 40 MG capsule Take 1 capsule (40 mg total) by mouth 2 (two) times daily before meals. Take first thing in the am and then 30 minutes prior to dinner 60 capsule 2    rosuvastatin (CRESTOR) 40 MG Tab TAKE 1 TABLET BY MOUTH ONCE DAILY IN THE EVENING 90 tablet 0    XARELTO 20 mg Tab Take 1 tablet by mouth once daily 30 tablet 0    ammonium lactate 12 % Crea Apply 1 g topically once daily. 140 g 1    ciprofloxacin HCl (CIPRO) 500 MG tablet Take 500 mg by mouth as needed.      estradioL (ESTRACE) 0.01 % (0.1 mg/gram) vaginal cream Place 1 g vaginally 3 (three) times a week. Place by fingertip application before bedtime three times a week (Monday, Wednesday, Friday) 45 g 3     Current Facility-Administered Medications on File Prior to Visit   Medication Dose Route Frequency Provider Last Rate Last Admin    sodium chloride 0.9% flush 10 mL  10 mL Intravenous PRN Yesenia Durham MD         Social History:     Social History     Tobacco Use    Smoking status: Former Smoker     Packs/day: 1.50     Quit date: 10/9/1987     Years since quittin.6    Smokeless tobacco: Never Used   Substance Use Topics    Alcohol use: Yes     Comment: social     Family History:     Family History   Problem Relation Age of Onset    Heart failure Mother     Stroke Father     Stroke Brother     Heart disease Maternal Aunt     Heart disease Maternal Uncle     Heart disease Maternal Aunt      "Heart disease Maternal Aunt     Heart disease Maternal Uncle      Physical Exam:   BP (!) 122/59 (BP Location: Left arm, Patient Position: Sitting, BP Method: Medium (Automatic))   Pulse 60   Ht 5' 5" (1.651 m)   Wt 73.5 kg (162 lb 0.6 oz)   SpO2 95%   BMI 26.96 kg/m²      Physical Exam  Constitutional:       General: She is not in acute distress.     Appearance: She is well-developed. She is not diaphoretic.   HENT:      Head: Normocephalic and atraumatic.   Eyes:      Conjunctiva/sclera: Conjunctivae normal.   Neck:      Trachea: No tracheal deviation.   Cardiovascular:      Rate and Rhythm: Normal rate and regular rhythm.      Heart sounds: Normal heart sounds. No murmur heard.  Pulmonary:      Effort: Pulmonary effort is normal. No respiratory distress.      Breath sounds: Normal breath sounds. No wheezing.   Abdominal:      General: Bowel sounds are normal. There is no distension.      Palpations: Abdomen is soft.      Tenderness: There is no abdominal tenderness.   Musculoskeletal:         General: Normal range of motion.      Cervical back: Normal range of motion.   Neurological:      Mental Status: She is alert and oriented to person, place, and time.         Labs:     Lab Results   Component Value Date     05/20/2021    K 3.5 05/20/2021     05/20/2021    CO2 31 (H) 05/20/2021    BUN 13 05/20/2021    CREATININE 0.9 05/20/2021    ANIONGAP 7 (L) 05/20/2021     No results found for: HGBA1C  No results found for: BNP, BNPTRIAGEBLO Lab Results   Component Value Date    WBC 7.37 06/08/2020    HGB 13.1 06/08/2020    HCT 37.7 06/08/2020     06/08/2020    GRAN 4.5 06/08/2020    GRAN 60.4 06/08/2020     Lab Results   Component Value Date    CHOL 136 09/06/2019    HDL 60 09/06/2019    LDLCALC 66.2 09/06/2019    TRIG 49 09/06/2019          Imaging:     TTE 4/30/21 - EF 60%, Normal diastolic function. Normal RV.     PET stress 7/2019 - normal    Assessment:     1. PAF (paroxysmal atrial " fibrillation)    2. Essential hypertension    3. Hyperlipidemia, unspecified hyperlipidemia type        Plan:   PAF (paroxysmal atrial fibrillation)  - No recent episodes, continue xarelto, flecainide, diltiazem. Follows with Dr Perez.    Essential hypertension  - BP well controlled, continue current meds    Hyperlipidemia, unspecified hyperlipidemia type  - Back on rosuvastatin, asked her to repeat lipid panel with her PCP in 2 months    Discussed with Dr. Graff. RTC in 1 year.     Yoshi Turner  Cardiology Fellow, PGY6

## 2022-06-02 ENCOUNTER — TELEPHONE (OUTPATIENT)
Dept: ELECTROPHYSIOLOGY | Facility: CLINIC | Age: 77
End: 2022-06-02
Payer: MEDICARE

## 2022-06-02 RX ORDER — HYDROCHLOROTHIAZIDE 25 MG/1
TABLET ORAL
Qty: 90 TABLET | Refills: 3 | Status: SHIPPED | OUTPATIENT
Start: 2022-06-02 | End: 2023-06-02 | Stop reason: SDUPTHER

## 2022-06-02 RX ORDER — RIVAROXABAN 20 MG/1
TABLET, FILM COATED ORAL
Qty: 30 TABLET | Refills: 6 | Status: SHIPPED | OUTPATIENT
Start: 2022-06-02 | End: 2023-01-23 | Stop reason: SDUPTHER

## 2022-06-03 ENCOUNTER — OFFICE VISIT (OUTPATIENT)
Dept: ELECTROPHYSIOLOGY | Facility: CLINIC | Age: 77
End: 2022-06-03
Payer: MEDICARE

## 2022-06-03 ENCOUNTER — HOSPITAL ENCOUNTER (OUTPATIENT)
Dept: CARDIOLOGY | Facility: CLINIC | Age: 77
Discharge: HOME | End: 2022-06-03
Payer: MEDICARE

## 2022-06-03 VITALS
WEIGHT: 162.94 LBS | SYSTOLIC BLOOD PRESSURE: 133 MMHG | BODY MASS INDEX: 27.15 KG/M2 | HEART RATE: 60 BPM | HEIGHT: 65 IN | DIASTOLIC BLOOD PRESSURE: 63 MMHG

## 2022-06-03 DIAGNOSIS — I10 ESSENTIAL HYPERTENSION: Primary | ICD-10-CM

## 2022-06-03 DIAGNOSIS — I48.0 PAF (PAROXYSMAL ATRIAL FIBRILLATION): ICD-10-CM

## 2022-06-03 PROCEDURE — 3008F PR BODY MASS INDEX (BMI) DOCUMENTED: ICD-10-PCS | Mod: CPTII,S$GLB,, | Performed by: INTERNAL MEDICINE

## 2022-06-03 PROCEDURE — 93010 RHYTHM STRIP: ICD-10-PCS | Mod: S$GLB,,, | Performed by: INTERNAL MEDICINE

## 2022-06-03 PROCEDURE — 3078F PR MOST RECENT DIASTOLIC BLOOD PRESSURE < 80 MM HG: ICD-10-PCS | Mod: CPTII,S$GLB,, | Performed by: INTERNAL MEDICINE

## 2022-06-03 PROCEDURE — 99214 PR OFFICE/OUTPT VISIT, EST, LEVL IV, 30-39 MIN: ICD-10-PCS | Mod: S$GLB,,, | Performed by: INTERNAL MEDICINE

## 2022-06-03 PROCEDURE — 1159F MED LIST DOCD IN RCRD: CPT | Mod: CPTII,S$GLB,, | Performed by: INTERNAL MEDICINE

## 2022-06-03 PROCEDURE — 1101F PT FALLS ASSESS-DOCD LE1/YR: CPT | Mod: CPTII,S$GLB,, | Performed by: INTERNAL MEDICINE

## 2022-06-03 PROCEDURE — 3288F FALL RISK ASSESSMENT DOCD: CPT | Mod: CPTII,S$GLB,, | Performed by: INTERNAL MEDICINE

## 2022-06-03 PROCEDURE — 99999 PR PBB SHADOW E&M-EST. PATIENT-LVL III: CPT | Mod: PBBFAC,,, | Performed by: INTERNAL MEDICINE

## 2022-06-03 PROCEDURE — 3288F PR FALLS RISK ASSESSMENT DOCUMENTED: ICD-10-PCS | Mod: CPTII,S$GLB,, | Performed by: INTERNAL MEDICINE

## 2022-06-03 PROCEDURE — 3008F BODY MASS INDEX DOCD: CPT | Mod: CPTII,S$GLB,, | Performed by: INTERNAL MEDICINE

## 2022-06-03 PROCEDURE — 1160F PR REVIEW ALL MEDS BY PRESCRIBER/CLIN PHARMACIST DOCUMENTED: ICD-10-PCS | Mod: CPTII,S$GLB,, | Performed by: INTERNAL MEDICINE

## 2022-06-03 PROCEDURE — 1126F AMNT PAIN NOTED NONE PRSNT: CPT | Mod: CPTII,S$GLB,, | Performed by: INTERNAL MEDICINE

## 2022-06-03 PROCEDURE — 1160F RVW MEDS BY RX/DR IN RCRD: CPT | Mod: CPTII,S$GLB,, | Performed by: INTERNAL MEDICINE

## 2022-06-03 PROCEDURE — 99999 PR PBB SHADOW E&M-EST. PATIENT-LVL III: ICD-10-PCS | Mod: PBBFAC,,, | Performed by: INTERNAL MEDICINE

## 2022-06-03 PROCEDURE — 93010 ELECTROCARDIOGRAM REPORT: CPT | Mod: S$GLB,,, | Performed by: INTERNAL MEDICINE

## 2022-06-03 PROCEDURE — 1126F PR PAIN SEVERITY QUANTIFIED, NO PAIN PRESENT: ICD-10-PCS | Mod: CPTII,S$GLB,, | Performed by: INTERNAL MEDICINE

## 2022-06-03 PROCEDURE — 3075F SYST BP GE 130 - 139MM HG: CPT | Mod: CPTII,S$GLB,, | Performed by: INTERNAL MEDICINE

## 2022-06-03 PROCEDURE — 3078F DIAST BP <80 MM HG: CPT | Mod: CPTII,S$GLB,, | Performed by: INTERNAL MEDICINE

## 2022-06-03 PROCEDURE — 93005 ELECTROCARDIOGRAM TRACING: CPT | Mod: S$GLB,,, | Performed by: INTERNAL MEDICINE

## 2022-06-03 PROCEDURE — 1159F PR MEDICATION LIST DOCUMENTED IN MEDICAL RECORD: ICD-10-PCS | Mod: CPTII,S$GLB,, | Performed by: INTERNAL MEDICINE

## 2022-06-03 PROCEDURE — 99214 OFFICE O/P EST MOD 30 MIN: CPT | Mod: S$GLB,,, | Performed by: INTERNAL MEDICINE

## 2022-06-03 PROCEDURE — 93005 RHYTHM STRIP: ICD-10-PCS | Mod: S$GLB,,, | Performed by: INTERNAL MEDICINE

## 2022-06-03 PROCEDURE — 1101F PR PT FALLS ASSESS DOC 0-1 FALLS W/OUT INJ PAST YR: ICD-10-PCS | Mod: CPTII,S$GLB,, | Performed by: INTERNAL MEDICINE

## 2022-06-03 PROCEDURE — 3075F PR MOST RECENT SYSTOLIC BLOOD PRESS GE 130-139MM HG: ICD-10-PCS | Mod: CPTII,S$GLB,, | Performed by: INTERNAL MEDICINE

## 2022-06-03 RX ORDER — CEFDINIR 300 MG/1
300 CAPSULE ORAL 2 TIMES DAILY
COMMUNITY
Start: 2022-06-01 | End: 2023-02-08

## 2022-06-03 NOTE — PROGRESS NOTES
Subjective:    Patient ID:  Rachel Silver is a 76 y.o. female who presents for evaluation of AF    HPI  76 y.o. F  HTN on meds  HL on meds  PAF    Tachypalpitations on and off for years. Usually goes away by itself.  Had them several times per month. Lasts 5-10 mins.   Great exertion tolerance.    We started flecainide with a great response. Only 1 episode c/w AF, self-limited, for years.    echo 7/19 LVEF 40% (during rapid AF) -> 12/2019 60%  Holter 7/19 SR, PAF/AFL  PET neg    My interpretation of today's ECG is SB 57 bpm    Review of Systems   Constitutional: Negative. Negative for malaise/fatigue.   HENT: Negative.  Negative for ear pain and tinnitus.    Eyes: Negative for blurred vision.   Cardiovascular: Negative for chest pain, dyspnea on exertion, near-syncope, palpitations and syncope.   Respiratory: Negative.  Negative for shortness of breath.    Endocrine: Negative.  Negative for polyuria.   Hematologic/Lymphatic: Does not bruise/bleed easily.   Skin: Negative.  Negative for rash.   Musculoskeletal: Negative.  Negative for joint pain and muscle weakness.   Gastrointestinal: Negative.  Negative for abdominal pain and change in bowel habit.   Genitourinary: Negative for frequency.   Neurological: Negative.  Negative for dizziness and weakness.   Psychiatric/Behavioral: Negative.  Negative for depression. The patient is not nervous/anxious.    Allergic/Immunologic: Negative for environmental allergies.        Objective:    Physical Exam  Vitals and nursing note reviewed.   Constitutional:       Appearance: Normal appearance. She is well-developed.   HENT:      Head: Normocephalic and atraumatic.   Eyes:      Conjunctiva/sclera: Conjunctivae normal.   Neck:      Thyroid: No thyroid mass or thyromegaly.      Trachea: No tracheal deviation.   Cardiovascular:      Rate and Rhythm: Normal rate and regular rhythm.  No extrasystoles are present.     Chest Wall: PMI is not displaced.      Pulses: Normal pulses  and intact distal pulses.      Heart sounds: Normal heart sounds. No murmur heard.    No friction rub. No gallop.   Pulmonary:      Effort: Pulmonary effort is normal. No respiratory distress.      Breath sounds: Normal breath sounds. No wheezing or rales.   Abdominal:      General: There is no distension.      Palpations: Abdomen is soft.   Musculoskeletal:         General: Normal range of motion.      Cervical back: Normal range of motion. No edema.   Skin:     General: Skin is warm and dry.      Findings: No rash.   Neurological:      Mental Status: She is alert and oriented to person, place, and time.      Coordination: Coordination normal.   Psychiatric:         Speech: Speech normal.         Behavior: Behavior normal. Behavior is cooperative.         Thought Content: Thought content normal.           Assessment:       1. Essential hypertension    2. PAF (paroxysmal atrial fibrillation)         Plan:       Doing well on flec / dilt / xarelto. Continue.    Return in 1 year, or earlier prn.

## 2022-06-29 ENCOUNTER — PATIENT MESSAGE (OUTPATIENT)
Dept: OTOLARYNGOLOGY | Facility: CLINIC | Age: 77
End: 2022-06-29
Payer: MEDICARE

## 2022-09-02 ENCOUNTER — OFFICE VISIT (OUTPATIENT)
Dept: SPORTS MEDICINE | Facility: CLINIC | Age: 77
End: 2022-09-02
Payer: MEDICARE

## 2022-09-02 ENCOUNTER — HOSPITAL ENCOUNTER (OUTPATIENT)
Dept: RADIOLOGY | Facility: HOSPITAL | Age: 77
Discharge: HOME | End: 2022-09-02
Attending: PHYSICIAN ASSISTANT
Payer: MEDICARE

## 2022-09-02 VITALS
DIASTOLIC BLOOD PRESSURE: 84 MMHG | SYSTOLIC BLOOD PRESSURE: 139 MMHG | HEIGHT: 65 IN | BODY MASS INDEX: 27.49 KG/M2 | WEIGHT: 165 LBS

## 2022-09-02 DIAGNOSIS — M25.552 LEFT HIP PAIN: ICD-10-CM

## 2022-09-02 DIAGNOSIS — M70.62 GREATER TROCHANTERIC BURSITIS, LEFT: Primary | ICD-10-CM

## 2022-09-02 PROCEDURE — 1160F PR REVIEW ALL MEDS BY PRESCRIBER/CLIN PHARMACIST DOCUMENTED: ICD-10-PCS | Mod: CPTII,S$GLB,, | Performed by: PHYSICIAN ASSISTANT

## 2022-09-02 PROCEDURE — 99999 PR PBB SHADOW E&M-EST. PATIENT-LVL III: ICD-10-PCS | Mod: PBBFAC,,, | Performed by: PHYSICIAN ASSISTANT

## 2022-09-02 PROCEDURE — 3079F PR MOST RECENT DIASTOLIC BLOOD PRESSURE 80-89 MM HG: ICD-10-PCS | Mod: CPTII,S$GLB,, | Performed by: PHYSICIAN ASSISTANT

## 2022-09-02 PROCEDURE — 99214 OFFICE O/P EST MOD 30 MIN: CPT | Mod: S$GLB,,, | Performed by: PHYSICIAN ASSISTANT

## 2022-09-02 PROCEDURE — 1126F PR PAIN SEVERITY QUANTIFIED, NO PAIN PRESENT: ICD-10-PCS | Mod: CPTII,S$GLB,, | Performed by: PHYSICIAN ASSISTANT

## 2022-09-02 PROCEDURE — 1101F PT FALLS ASSESS-DOCD LE1/YR: CPT | Mod: CPTII,S$GLB,, | Performed by: PHYSICIAN ASSISTANT

## 2022-09-02 PROCEDURE — 73502 X-RAY EXAM HIP UNI 2-3 VIEWS: CPT | Mod: 26,LT,, | Performed by: RADIOLOGY

## 2022-09-02 PROCEDURE — 3075F SYST BP GE 130 - 139MM HG: CPT | Mod: CPTII,S$GLB,, | Performed by: PHYSICIAN ASSISTANT

## 2022-09-02 PROCEDURE — 1101F PR PT FALLS ASSESS DOC 0-1 FALLS W/OUT INJ PAST YR: ICD-10-PCS | Mod: CPTII,S$GLB,, | Performed by: PHYSICIAN ASSISTANT

## 2022-09-02 PROCEDURE — 1159F MED LIST DOCD IN RCRD: CPT | Mod: CPTII,S$GLB,, | Performed by: PHYSICIAN ASSISTANT

## 2022-09-02 PROCEDURE — 3008F PR BODY MASS INDEX (BMI) DOCUMENTED: ICD-10-PCS | Mod: CPTII,S$GLB,, | Performed by: PHYSICIAN ASSISTANT

## 2022-09-02 PROCEDURE — 3288F FALL RISK ASSESSMENT DOCD: CPT | Mod: CPTII,S$GLB,, | Performed by: PHYSICIAN ASSISTANT

## 2022-09-02 PROCEDURE — 3008F BODY MASS INDEX DOCD: CPT | Mod: CPTII,S$GLB,, | Performed by: PHYSICIAN ASSISTANT

## 2022-09-02 PROCEDURE — 99214 PR OFFICE/OUTPT VISIT, EST, LEVL IV, 30-39 MIN: ICD-10-PCS | Mod: S$GLB,,, | Performed by: PHYSICIAN ASSISTANT

## 2022-09-02 PROCEDURE — 3075F PR MOST RECENT SYSTOLIC BLOOD PRESS GE 130-139MM HG: ICD-10-PCS | Mod: CPTII,S$GLB,, | Performed by: PHYSICIAN ASSISTANT

## 2022-09-02 PROCEDURE — 73502 XR HIP WITH PELVIS WHEN PERFORMED, 2 OR 3 VIEWS LEFT: ICD-10-PCS | Mod: 26,LT,, | Performed by: RADIOLOGY

## 2022-09-02 PROCEDURE — 3079F DIAST BP 80-89 MM HG: CPT | Mod: CPTII,S$GLB,, | Performed by: PHYSICIAN ASSISTANT

## 2022-09-02 PROCEDURE — 1160F RVW MEDS BY RX/DR IN RCRD: CPT | Mod: CPTII,S$GLB,, | Performed by: PHYSICIAN ASSISTANT

## 2022-09-02 PROCEDURE — 1159F PR MEDICATION LIST DOCUMENTED IN MEDICAL RECORD: ICD-10-PCS | Mod: CPTII,S$GLB,, | Performed by: PHYSICIAN ASSISTANT

## 2022-09-02 PROCEDURE — 1126F AMNT PAIN NOTED NONE PRSNT: CPT | Mod: CPTII,S$GLB,, | Performed by: PHYSICIAN ASSISTANT

## 2022-09-02 PROCEDURE — 3288F PR FALLS RISK ASSESSMENT DOCUMENTED: ICD-10-PCS | Mod: CPTII,S$GLB,, | Performed by: PHYSICIAN ASSISTANT

## 2022-09-02 PROCEDURE — 73502 X-RAY EXAM HIP UNI 2-3 VIEWS: CPT | Mod: TC,LT

## 2022-09-02 PROCEDURE — 99999 PR PBB SHADOW E&M-EST. PATIENT-LVL III: CPT | Mod: PBBFAC,,, | Performed by: PHYSICIAN ASSISTANT

## 2022-09-02 NOTE — PROGRESS NOTES
Subjective:          Chief Complaint: Rachel Silver is a 76 y.o. female who had concerns including Pain of the Left Hip.    Rachel Silver is a retiree who presents to clinic for left hip pain. The pain started during a trip to [place], [place] 6 weeks ago and persisted until 2 weeks ago. She reports increased activity during this time, walking on uneven surfaces, and white water rafting.  She reported mild pain radiating down her lateral thigh from her hip to her knee. She took Tylenol prn. Patient is no longer experiencing pain, but wanted to keep appointment. Pain was located over (points to) lateral left hip. She reports that the pain is a 0 /10. No other concerns at this time.     No previous surgeries or recent trauma on left hip    Ambulating with no assistance.               Review of Systems   Constitutional: Negative for chills and fever.   Cardiovascular:  Negative for chest pain, leg swelling and palpitations.   Respiratory:  Negative for cough and shortness of breath.    Musculoskeletal:  Positive for joint pain. Negative for back pain, joint swelling, muscle weakness, myalgias and stiffness.   Gastrointestinal:  Negative for abdominal pain, constipation, diarrhea, nausea and vomiting.   Genitourinary: Negative.    Neurological:  Negative for dizziness, headaches, numbness and paresthesias.   Psychiatric/Behavioral:  Negative for altered mental status.      Pain Related Questions  Over the past 3 days, what was your average pain during activity? (I.e. running, jogging, walking, climbing stairs, getting dressed, ect.): 1  Over the past 3 days, what was your highest pain level?: 1  Over the past 3 days, what was your lowest pain level? : 1    Other  How many nights a week are you awakened by your affected body part?: 0  Was the patient's HEIGHT measured or patient reported?: Patient Reported  Was the patient's WEIGHT measured or patient reported?: Measured      Objective:        General: Rachel simental  well-developed, well-nourished, appears stated age, in no acute distress, alert and oriented to time, place and person.     General    Vitals reviewed.  Constitutional: She is oriented to person, place, and time. She appears well-developed and well-nourished. No distress.   HENT:   Head: Normocephalic and atraumatic.   Eyes: EOM are normal.   Cardiovascular:  Normal rate and regular rhythm.            Pulmonary/Chest: Effort normal. No respiratory distress.   Neurological: She is alert and oriented to person, place, and time.   Psychiatric: She has a normal mood and affect. Her behavior is normal. Thought content normal.     General Musculoskeletal Exam   Gait: normal   Pelvic Obliquity: none      Right Knee Exam     Inspection   Alignment:  normal  Effusion: absent    Left Knee Exam     Inspection   Alignment:  normal  Effusion: absent    Right Hip Exam   Right hip exam is normal.     Inspection   Scars: absent  Swelling: absent  Bruising: absent  No deformity of hip.  Quadriceps Atrophy:  Negative  Erythema: absent    Range of Motion   The patient has normal right hip ROM.  Abduction:  40 normal   Adduction:  20 normal   Extension:  0 normal   Flexion:  120 normal   External rotation:  50 normal   Internal rotation:  30 normal     Muscle Strength   The patient has normal right hip strength.    Tests   Pain w/ forced internal rotation (SUYAPA): absent  Pain w/ forced external rotation (FADIR): absent  Ed: negative  Trendelenburg Test: negative  Circumduction test: negative  Stinchfield test: negative  Log Roll: negative  Step-down test: negative  Resisted sit-up pain: negative    Other   Sensation: normal  Left Hip Exam   Left hip exam is normal.    Inspection   Scars: absent  Swelling: absent  No deformity of hip.  Quadriceps Atrophy:  negative  Erythema: absent  Bruising: absent    Tenderness   The patient tender to palpation of the trochanteric bursa.    Range of Motion   The patient has normal left hip  ROM.  Abduction:  40 normal   Adduction:  20 normal   Extension:  0 normal   Flexion:  120 normal   External rotation:  50 normal   Internal rotation: 30 normal     Muscle Strength   The patient has normal left hip strength.     Tests   Pain w/ forced internal rotation (SUYAPA): absent  Pain w/ forced external rotation (FADIR): absent  Ed: negative  Trendelenburg Test: negative  Circumduction test: negative  Log Roll: negative  Step-down test: negative  Resisted sit-up pain: negative    Other   Sensation: normal    Comments:  Mild pain with deep palpation of greater trochanteric bursa      Back (L-Spine & T-Spine) / Neck (C-Spine) Exam   Back exam is normal.      Muscle Strength   Right Lower Extremity   Hip Abduction: 5/5   Hip Adduction: 5/5   Hip Flexion: 5/5   Left Lower Extremity   Hip Abduction: 5/5   Hip Adduction: 5/5   Hip Flexion: 5/5     Reflexes     Left Side  Achilles:  2+  Quadriceps:  2+    Right Side   Achilles:  2+  Quadriceps:  2+    RADIOGRAPHS  Xray hip 9/2/2022  FINDINGS:  Hip joint spaces are normally maintained on both sides, without significant narrowing.  No conventional radiographic evidence to specifically suggest avascular necrosis of either femoral head.  No evidence of recent or healing fracture or lytic destructive process.  SI joints appear unremarkable.      Assessment:       Encounter Diagnoses   Name Primary?    Left hip pain     Greater trochanteric bursitis, left Yes          Plan:         I made the decision to obtain old records of the patient including previous notes and imaging. New imaging was ordered today of the extremity or extremities evaluated. I independently reviewed and interpreted the radiographs and/or MRIs today as well as prior imaging.  Patient no longer experiencing pain-- educated patient that increased activity during trip likely cause greater trochanteric bursitis and IT band syndrome, which self-resolved with rest and tylenol use prn  Voltaren gel up to 3x  daily to affected area prn  May continue Tylenol/NSAIDs prn  RTC as needed                Patient questionnaires may have been collected.

## 2022-09-21 ENCOUNTER — OFFICE VISIT (OUTPATIENT)
Dept: OTOLARYNGOLOGY | Facility: CLINIC | Age: 77
End: 2022-09-21
Payer: MEDICARE

## 2022-09-21 VITALS — BODY MASS INDEX: 27.85 KG/M2 | WEIGHT: 167.13 LBS | HEIGHT: 65 IN

## 2022-09-21 DIAGNOSIS — J30.1 SEASONAL ALLERGIC RHINITIS DUE TO POLLEN: ICD-10-CM

## 2022-09-21 DIAGNOSIS — D02.0 SQUAMOUS CELL CARCINOMA IN SITU (SCCIS) OF TRUE VOCAL CORD: Primary | ICD-10-CM

## 2022-09-21 DIAGNOSIS — K21.9 LARYNGOPHARYNGEAL REFLUX (LPR): ICD-10-CM

## 2022-09-21 DIAGNOSIS — J34.3 HYPERTROPHY OF INFERIOR NASAL TURBINATE: ICD-10-CM

## 2022-09-21 PROCEDURE — 1101F PT FALLS ASSESS-DOCD LE1/YR: CPT | Mod: CPTII,S$GLB,, | Performed by: OTOLARYNGOLOGY

## 2022-09-21 PROCEDURE — 3008F BODY MASS INDEX DOCD: CPT | Mod: CPTII,S$GLB,, | Performed by: OTOLARYNGOLOGY

## 2022-09-21 PROCEDURE — 1126F PR PAIN SEVERITY QUANTIFIED, NO PAIN PRESENT: ICD-10-PCS | Mod: CPTII,S$GLB,, | Performed by: OTOLARYNGOLOGY

## 2022-09-21 PROCEDURE — 1101F PR PT FALLS ASSESS DOC 0-1 FALLS W/OUT INJ PAST YR: ICD-10-PCS | Mod: CPTII,S$GLB,, | Performed by: OTOLARYNGOLOGY

## 2022-09-21 PROCEDURE — 1126F AMNT PAIN NOTED NONE PRSNT: CPT | Mod: CPTII,S$GLB,, | Performed by: OTOLARYNGOLOGY

## 2022-09-21 PROCEDURE — 99213 PR OFFICE/OUTPT VISIT, EST, LEVL III, 20-29 MIN: ICD-10-PCS | Mod: 25,S$GLB,, | Performed by: OTOLARYNGOLOGY

## 2022-09-21 PROCEDURE — 3288F PR FALLS RISK ASSESSMENT DOCUMENTED: ICD-10-PCS | Mod: CPTII,S$GLB,, | Performed by: OTOLARYNGOLOGY

## 2022-09-21 PROCEDURE — 1159F MED LIST DOCD IN RCRD: CPT | Mod: CPTII,S$GLB,, | Performed by: OTOLARYNGOLOGY

## 2022-09-21 PROCEDURE — 3008F PR BODY MASS INDEX (BMI) DOCUMENTED: ICD-10-PCS | Mod: CPTII,S$GLB,, | Performed by: OTOLARYNGOLOGY

## 2022-09-21 PROCEDURE — 99213 OFFICE O/P EST LOW 20 MIN: CPT | Mod: 25,S$GLB,, | Performed by: OTOLARYNGOLOGY

## 2022-09-21 PROCEDURE — 3288F FALL RISK ASSESSMENT DOCD: CPT | Mod: CPTII,S$GLB,, | Performed by: OTOLARYNGOLOGY

## 2022-09-21 PROCEDURE — 1159F PR MEDICATION LIST DOCUMENTED IN MEDICAL RECORD: ICD-10-PCS | Mod: CPTII,S$GLB,, | Performed by: OTOLARYNGOLOGY

## 2022-09-21 NOTE — PROGRESS NOTES
OTOLARYNGOLOGY CLINIC NOTE  Date:  09/21/2022     Chief complaint:  Chief Complaint   Patient presents with    Follow-up     Check throat       History of Present Illness  Rachel Silver is a 76 y.o. female  presenting today for a followup.    She was lost to f/u after visit in 2-2021 untilvisit 3-7-22 . She underwent DL biopsy which was concerning for SCCa in situ, could not rule out invasive scca (6-11-20) who underwent microlaryngeal excision of vocal cord lesion with CO2 laser on 7-9-20 of her left true vocal fold. CO2 laser also used to vaporize resection bed. Pathology from cord excision showed moderate dysplasia with some crush artifact and sectioning artifact but did not appear to have any residual scca in situ.     Takes ppi prn, had reflux episode last night because of dietary .  Still with the throat clearing   No throat pain   Voice is good , no owrsening ; can get worse after goes to games     Past Medical History  Past Medical History:   Diagnosis Date    Atrial fibrillation     Cataracts, bilateral     Hoarse     Hypertension     UTI (urinary tract infection)     Vaginal delivery     x3        Past Surgical History  Past Surgical History:   Procedure Laterality Date    HYSTERECTOMY      LARYNGOSCOPY N/A 6/11/2020    Procedure: LARYNGOSCOPY;  Surgeon: Yesenia Durham MD;  Location: [facility] OR;  Service: ENT;  Laterality: N/A;  RN PRE OP, COVID NEGATIVE-- 6-8-2020 [place]  HAS-Cardiac Clearance today 6-8 [facility] [place]    MICROLARYNGOSCOPY N/A 7/9/2020    Procedure: MICROLARYNGOSCOPY;  Surgeon: Yesenia Durham MD;  Location: [facility] OR;  Service: ENT;  Laterality: N/A;    Microlaryngoscopy with biopsy vocal cords      TONSILLECTOMY      torn meniscus      VOCAL FOLD LESION EXCISION  7/9/2020    Procedure: EXCISION, LESION, VOCAL CORD, USING LASER;  Surgeon: Yesenia Durham MD;  Location: [facility] OR;  Service: ENT;;        Medications  Current Outpatient Medications on File Prior to Visit   Medication Sig Dispense  Refill    cefdinir (OMNICEF) 300 MG capsule Take 300 mg by mouth 2 (two) times daily.      diltiaZEM HCl (CARDIZEM LA) 240 mg 24 hr tablet Take 1 tablet (240 mg total) by mouth once daily. 90 tablet 3    flecainide (TAMBOCOR) 100 MG Tab TAKE 1 TABLET BY MOUTH EVERY 12 HOURS 180 tablet 3    hydroCHLOROthiazide (HYDRODIURIL) 25 MG tablet Take 1 tablet by mouth once daily 90 tablet 3    omeprazole (PRILOSEC) 40 MG capsule Take 1 capsule (40 mg total) by mouth 2 (two) times daily before meals. Take first thing in the am and then 30 minutes prior to dinner 60 capsule 2    rosuvastatin (CRESTOR) 40 MG Tab TAKE 1 TABLET BY MOUTH ONCE DAILY IN THE EVENING 90 tablet 3    XARELTO 20 mg Tab Take 1 tablet by mouth once daily 30 tablet 6     Current Facility-Administered Medications on File Prior to Visit   Medication Dose Route Frequency Provider Last Rate Last Admin    sodium chloride 0.9% flush 10 mL  10 mL Intravenous PRN Yesenia Durham MD           Review of Systems  Review of Systems   Constitutional: Negative.    HENT:  Positive for ear pain.    Eyes: Negative.    Respiratory: Negative.     Cardiovascular: Negative.    Gastrointestinal:  Positive for heartburn.   Genitourinary: Negative.    Skin: Negative.    Neurological:  Positive for headaches.   Psychiatric/Behavioral: Negative.        Answers submitted by the patient for this visit:  Review of Symptoms Questionnaire  (Submitted on 9/21/2022)  sinus pressure : Yes  Sinus infection(s)?: Yes  postnasal drip: Yes  Seasonal Allergies?: Yes    Social History   reports that she quit smoking about 34 years ago. She smoked an average of 1.5 packs per day. She has never used smokeless tobacco. She reports current alcohol use. She reports that she does not use drugs.     Family History  Family History   Problem Relation Age of Onset    Heart failure Mother     Stroke Father     Stroke Brother     Heart disease Maternal Aunt     Heart disease Maternal Uncle     Heart disease  Maternal Aunt     Heart disease Maternal Aunt     Heart disease Maternal Uncle         Physical Exam   There were no vitals filed for this visit. Body mass index is 27.81 kg/m².          GENERAL: no acute distress.  HEAD: normocephalic.   EYES: No scleral icterus  EARS: external ear without lesion, normal pinna shape and position.  External auditory canal with normal cerumen, tympanic membrane fully visible, no perforation , no retraction. No middle ear effusion. Ossicles intact.   NOSE: external nose without significant bony abnormality  ORAL CAVITY/OROPHARYNX: tongue mobile.   NECK: trachea midline.   LYMPH NODES:No cervical lymphadenopathy.  RESPIRATORY: no stridor, no stertor. Voice sounds good . No raspy quality Respirations nonlabored.  NEURO: alert, responds to questions appropriately.    PSYCH:mood appropriate    PROCEDURE NOTE  NAME OF PROCEDURE: Flexible Laryngoscopy, diagnostic  INDICATIONS: gag reflex precludes mirror exam, dysphonia, throat clearing, follow up exam for history of scca in situ of left vocal fold   FINDINGS: no malignancy    Consent: After procedure was explained in detail and all questions answered, verbal consent was obtained for performing flexible laryngoscopy.  Anesthesia: topical 4% lidocaine and neosynephrine  Procedure: With patient in seated position, the scope was inserted into the bilateral nasal passageway and advanced atraumatically into the nasopharynx to examine the following structures:  Nasal cavity: Turbinates with moderate hypertrophy. no middle meatal edema. No purulent drainage.   Nasopharynx: no mass or lesion noted in nasopharynx.   Oropharynx: base of tongue without  mass or ulceration. Lingual tonsils normal in appearance  Hypopharynx: posterior pharyngeal wall without mass or lesion. No pooling of secretions. Pyriform sinuses visible without mass or lesion  Larynx: epiglottis normal without lesion. False vocal folds without edema/erythema/lesion. True vocal folds  mobile and without lesion. Mild interarytenoid edema no erythema . Postcricoid region with no edema no lesion   Subglottis: visualized portion of subglottis normal in appearance    After examination performed, the scope was removed atraumatically . The patient tolerated the procedure well. Photodocumentation obtained , all images and/or videos uploaded in media section of epic.      Imaging:  The patient does not have any new imaging of the head and neck since last visit.     Labs:  CBC  Recent Labs   Lab 06/08/20  0940   WBC 7.37   Hemoglobin 13.1   Hematocrit 37.7   MCV 85   Platelets 215     BMP  Recent Labs   Lab 07/07/20  1020 05/20/21  1205   Glucose 99 93   Sodium 140 142   Potassium 3.9 3.5   Chloride 104 104   CO2 30 H 31 H   BUN 15 13   Creatinine 0.9 0.9   Calcium 9.5 10.0     COAGS        Assessment  1. 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 Laryngopharyngeal reflux (LPR)    3. Seasonal allergic rhinitis due to pollen    4. Hypertrophy of inferior nasal turbinate     Plan:  Discussed plan of care with patient in detail and all questions answered. Patient reported understanding of plan of care.   Try daily use of ppi if throat clearing bothering her; counseled to try for 3 months daily if wants to see if that could help.  Saline for congestion,offered med sprays she declined  Counseled on si/sx of throat cancer, notify me should these come up for sooner f/u otherwise f/u 6 months  I spent a total of 25 minutes on the day of the visit.  This includes face to face time and non-face to face time preparing to see the patient (eg, review of tests), obtaining and/or reviewing separately obtained history, documenting clinical information in the electronic or other health record, independently interpreting results and communicating results to the patient/family/caregiver, or care coordinator.   Please be aware that this note has been generated with the assistance of MModal voice-to-text.   Please excuse any spelling or grammatical errors.

## 2022-11-03 ENCOUNTER — PATIENT MESSAGE (OUTPATIENT)
Dept: UROLOGY | Facility: CLINIC | Age: 77
End: 2022-11-03
Payer: MEDICARE

## 2023-01-19 ENCOUNTER — PATIENT MESSAGE (OUTPATIENT)
Dept: UROLOGY | Facility: CLINIC | Age: 78
End: 2023-01-19
Payer: MEDICARE

## 2023-01-19 ENCOUNTER — PATIENT MESSAGE (OUTPATIENT)
Dept: OTOLARYNGOLOGY | Facility: CLINIC | Age: 78
End: 2023-01-19
Payer: MEDICARE

## 2023-02-08 ENCOUNTER — OFFICE VISIT (OUTPATIENT)
Dept: UROLOGY | Facility: CLINIC | Age: 78
End: 2023-02-08
Payer: MEDICARE

## 2023-02-08 VITALS
DIASTOLIC BLOOD PRESSURE: 81 MMHG | BODY MASS INDEX: 27.93 KG/M2 | WEIGHT: 167.63 LBS | HEART RATE: 59 BPM | HEIGHT: 65 IN | SYSTOLIC BLOOD PRESSURE: 146 MMHG

## 2023-02-08 DIAGNOSIS — N13.30 HYDRONEPHROSIS OF RIGHT KIDNEY: Primary | ICD-10-CM

## 2023-02-08 DIAGNOSIS — R31.29 MICROSCOPIC HEMATURIA: ICD-10-CM

## 2023-02-08 DIAGNOSIS — N95.2 VAGINAL ATROPHY: ICD-10-CM

## 2023-02-08 PROCEDURE — 1126F PR PAIN SEVERITY QUANTIFIED, NO PAIN PRESENT: ICD-10-PCS | Mod: CPTII,S$GLB,, | Performed by: UROLOGY

## 2023-02-08 PROCEDURE — 1101F PR PT FALLS ASSESS DOC 0-1 FALLS W/OUT INJ PAST YR: ICD-10-PCS | Mod: CPTII,S$GLB,, | Performed by: UROLOGY

## 2023-02-08 PROCEDURE — 51701 INSERT BLADDER CATHETER: CPT | Mod: S$GLB,,, | Performed by: UROLOGY

## 2023-02-08 PROCEDURE — 3079F PR MOST RECENT DIASTOLIC BLOOD PRESSURE 80-89 MM HG: ICD-10-PCS | Mod: CPTII,S$GLB,, | Performed by: UROLOGY

## 2023-02-08 PROCEDURE — 81000 CHG URINALYSIS, NONAUTO, W/SCOPE: ICD-10-PCS | Mod: S$GLB,,, | Performed by: UROLOGY

## 2023-02-08 PROCEDURE — 1126F AMNT PAIN NOTED NONE PRSNT: CPT | Mod: CPTII,S$GLB,, | Performed by: UROLOGY

## 2023-02-08 PROCEDURE — 99215 OFFICE O/P EST HI 40 MIN: CPT | Mod: 25,S$GLB,, | Performed by: UROLOGY

## 2023-02-08 PROCEDURE — 51701 PR INSERTION OF NON-INDWELLING BLADDER CATHETERIZATION FOR RESIDUAL UR: ICD-10-PCS | Mod: S$GLB,,, | Performed by: UROLOGY

## 2023-02-08 PROCEDURE — 3288F PR FALLS RISK ASSESSMENT DOCUMENTED: ICD-10-PCS | Mod: CPTII,S$GLB,, | Performed by: UROLOGY

## 2023-02-08 PROCEDURE — 99999 PR PBB SHADOW E&M-EST. PATIENT-LVL III: CPT | Mod: PBBFAC,,, | Performed by: UROLOGY

## 2023-02-08 PROCEDURE — 99215 PR OFFICE/OUTPT VISIT, EST, LEVL V, 40-54 MIN: ICD-10-PCS | Mod: 25,S$GLB,, | Performed by: UROLOGY

## 2023-02-08 PROCEDURE — 3077F PR MOST RECENT SYSTOLIC BLOOD PRESSURE >= 140 MM HG: ICD-10-PCS | Mod: CPTII,S$GLB,, | Performed by: UROLOGY

## 2023-02-08 PROCEDURE — 1101F PT FALLS ASSESS-DOCD LE1/YR: CPT | Mod: CPTII,S$GLB,, | Performed by: UROLOGY

## 2023-02-08 PROCEDURE — 3079F DIAST BP 80-89 MM HG: CPT | Mod: CPTII,S$GLB,, | Performed by: UROLOGY

## 2023-02-08 PROCEDURE — 81000 URINALYSIS NONAUTO W/SCOPE: CPT | Mod: S$GLB,,, | Performed by: UROLOGY

## 2023-02-08 PROCEDURE — 3077F SYST BP >= 140 MM HG: CPT | Mod: CPTII,S$GLB,, | Performed by: UROLOGY

## 2023-02-08 PROCEDURE — 1159F MED LIST DOCD IN RCRD: CPT | Mod: CPTII,S$GLB,, | Performed by: UROLOGY

## 2023-02-08 PROCEDURE — 99999 PR PBB SHADOW E&M-EST. PATIENT-LVL III: ICD-10-PCS | Mod: PBBFAC,,, | Performed by: UROLOGY

## 2023-02-08 PROCEDURE — 3288F FALL RISK ASSESSMENT DOCD: CPT | Mod: CPTII,S$GLB,, | Performed by: UROLOGY

## 2023-02-08 PROCEDURE — 1159F PR MEDICATION LIST DOCUMENTED IN MEDICAL RECORD: ICD-10-PCS | Mod: CPTII,S$GLB,, | Performed by: UROLOGY

## 2023-02-08 RX ORDER — ESTRADIOL 0.1 MG/G
1 CREAM VAGINAL
Qty: 45 G | Refills: 3 | Status: SHIPPED | OUTPATIENT
Start: 2023-02-08

## 2023-02-08 RX ORDER — DOXYCYCLINE HYCLATE 100 MG
100 TABLET ORAL ONCE
Status: CANCELLED | OUTPATIENT
Start: 2023-02-08 | End: 2023-02-08

## 2023-02-08 RX ORDER — LIDOCAINE HYDROCHLORIDE 20 MG/ML
JELLY TOPICAL ONCE
Status: CANCELLED | OUTPATIENT
Start: 2023-02-08 | End: 2023-02-08

## 2023-02-08 NOTE — PROGRESS NOTES
CHIEF COMPLAINT:    Mrs. Silver is a 77 y.o. female presenting for a follow up on right hydronephrosis    PRESENTING ILLNESS:    Rachel Silver is a 77 y.o. female who is presents with a history of incomplete bladder emptying, who returns for follow up.  She states she takes time to empty her bladder and has not had to self cath since the last time she was here.  She has nocturia x 0-1, generally after 3 am.  She has not had a bladder infection since she was last here on 7/29/2021.  She just wanted to have follow up and be checked.      She has some vaginal itching, irritation from dryness.     REVIEW OF SYSTEMS:    Review of Systems   Constitutional: Negative.    HENT: Negative.     Eyes: Negative.    Respiratory: Negative.     Cardiovascular: Negative.    Gastrointestinal: Negative.    Genitourinary: Negative.    Musculoskeletal: Negative.    Skin: Negative.    Neurological: Negative.    Endo/Heme/Allergies: Negative.    Psychiatric/Behavioral: Negative.       PATIENT HISTORY:    Past Medical History:   Diagnosis Date    Atrial fibrillation     Cataracts, bilateral     Hoarse     Hypertension     UTI (urinary tract infection)     Vaginal delivery     x3       Past Surgical History:   Procedure Laterality Date    HYSTERECTOMY      LARYNGOSCOPY N/A 6/11/2020    Procedure: LARYNGOSCOPY;  Surgeon: Yesenia Durham MD;  Location: [facility] OR;  Service: ENT;  Laterality: N/A;  RN PRE OP, COVID NEGATIVE-- 6-8-2020 [place]  HAS-Cardiac Clearance today 6-8 [facility] [place]    MICROLARYNGOSCOPY N/A 7/9/2020    Procedure: MICROLARYNGOSCOPY;  Surgeon: Yesenia Durham MD;  Location: [facility] OR;  Service: ENT;  Laterality: N/A;    Microlaryngoscopy with biopsy vocal cords      TONSILLECTOMY      torn meniscus      VOCAL FOLD LESION EXCISION  7/9/2020    Procedure: EXCISION, LESION, VOCAL CORD, USING LASER;  Surgeon: Yesenia Durham MD;  Location: [facility] OR;  Service: ENT;;       Family History   Problem Relation Age of Onset    Heart failure  Mother     Stroke Father     Stroke Brother     Heart disease Maternal Aunt     Heart disease Maternal Uncle     Heart disease Maternal Aunt     Heart disease Maternal Aunt     Heart disease Maternal Uncle        Social History     Socioeconomic History    Marital status:    Tobacco Use    Smoking status: Former     Packs/day: 1.50     Types: Cigarettes     Quit date: 10/9/1987     Years since quittin.3    Smokeless tobacco: Never   Substance and Sexual Activity    Alcohol use: Yes     Comment: social    Drug use: Never    Sexual activity: Not Currently     Partners: Male       Allergies:  Patient has no known allergies.    Medications:  Outpatient Encounter Medications as of 2023   Medication Sig Dispense Refill    cefdinir (OMNICEF) 300 MG capsule Take 300 mg by mouth 2 (two) times daily.      diltiaZEM HCl (CARDIZEM LA) 240 mg 24 hr tablet Take 1 tablet by mouth once daily 90 tablet 0    flecainide (TAMBOCOR) 100 MG Tab TAKE 1 TABLET BY MOUTH EVERY 12 HOURS 180 tablet 1    hydroCHLOROthiazide (HYDRODIURIL) 25 MG tablet Take 1 tablet by mouth once daily 90 tablet 3    omeprazole (PRILOSEC) 40 MG capsule Take 1 capsule (40 mg total) by mouth 2 (two) times daily before meals. Take first thing in the am and then 30 minutes prior to dinner 60 capsule 2    rivaroxaban (XARELTO) 20 mg Tab Take 1 tablet (20 mg total) by mouth once daily. 90 tablet 3    rosuvastatin (CRESTOR) 40 MG Tab TAKE 1 TABLET BY MOUTH ONCE DAILY IN THE EVENING 90 tablet 3     Facility-Administered Encounter Medications as of 2023   Medication Dose Route Frequency Provider Last Rate Last Admin    sodium chloride 0.9% flush 10 mL  10 mL Intravenous PRN Yesenia Durham MD             PHYSICAL EXAMINATION:    The patient generally appears in good health, is appropriately interactive, and is in no apparent distress.    Skin: No lesions.    Mental: Cooperative with normal affect.    Neuro: Grossly intact.    HEENT: Normal. No  evidence of lymphadenopathy.    Chest:  normal inspiratory effort.    Abdomen: Soft, non-tender. No masses or organomegaly. Bladder is not palpable. No evidence of flank discomfort. No evidence of inguinal hernia.    Extremities: No clubbing, cyanosis, or edema    Grade II Urogenital atrophy  Normal external female genitalia  Urethral meatus is normal  Urethra and bladder are nontender to bimanual exam  Well supported anteriorly and posteriorly   Uterus and cervix are surgically absent  No adnexal masses  PVR by catheterization was 50 ml  LABS:    Lab Results   Component Value Date    BUN 13 05/20/2021    CREATININE 0.9 05/20/2021       UA 1.020, pH 5, tr protein, 250 blood, otherwise, negative. (Red cells in clumps and one cast seen on microscopy)    IMPRESSION:    Microscopic hematuria   Vaginal atrophy    PLAN:    1. BMP, CT urogram and cystoscopy for the microscopic hematuria  2.  Estrace cream for the vaginal atrophy.  [facility] information handout provided.    I spent 40 minutes with the patient of which more than half was spent in direct consultation with the patient in regards to our treatment and plan.

## 2023-02-09 ENCOUNTER — PATIENT MESSAGE (OUTPATIENT)
Dept: UROLOGY | Facility: CLINIC | Age: 78
End: 2023-02-09
Payer: MEDICARE

## 2023-02-09 DIAGNOSIS — I10 ESSENTIAL HYPERTENSION: Primary | ICD-10-CM

## 2023-02-10 ENCOUNTER — LAB VISIT (OUTPATIENT)
Dept: LAB | Facility: HOSPITAL | Age: 78
End: 2023-02-10
Attending: UROLOGY
Payer: MEDICARE

## 2023-02-10 DIAGNOSIS — N13.30 HYDRONEPHROSIS OF RIGHT KIDNEY: ICD-10-CM

## 2023-02-10 DIAGNOSIS — R31.29 MICROSCOPIC HEMATURIA: ICD-10-CM

## 2023-02-10 LAB
ANION GAP SERPL CALC-SCNC: 10 MMOL/L (ref 8–16)
BUN SERPL-MCNC: 13 MG/DL (ref 8–23)
CALCIUM SERPL-MCNC: 9.5 MG/DL (ref 8.7–10.5)
CHLORIDE SERPL-SCNC: 103 MMOL/L (ref 95–110)
CO2 SERPL-SCNC: 26 MMOL/L (ref 23–29)
CREAT SERPL-MCNC: 0.8 MG/DL (ref 0.5–1.4)
EST. GFR  (NO RACE VARIABLE): >60 ML/MIN/1.73 M^2
GLUCOSE SERPL-MCNC: 100 MG/DL (ref 70–110)
POTASSIUM SERPL-SCNC: 3.5 MMOL/L (ref 3.5–5.1)
SODIUM SERPL-SCNC: 139 MMOL/L (ref 136–145)

## 2023-02-10 PROCEDURE — 80048 BASIC METABOLIC PNL TOTAL CA: CPT | Performed by: UROLOGY

## 2023-02-10 PROCEDURE — 36415 COLL VENOUS BLD VENIPUNCTURE: CPT | Mod: PN | Performed by: UROLOGY

## 2023-02-28 ENCOUNTER — TELEPHONE (OUTPATIENT)
Dept: UROLOGY | Facility: CLINIC | Age: 78
End: 2023-02-28
Payer: MEDICARE

## 2023-02-28 NOTE — TELEPHONE ENCOUNTER
----- Message from Leighann Marmolejo MD sent at 2023  9:31 AM CST -----  Please reschedule the CT Sacn, the patient's car battery  and she missed the appointment.  Thanks. I'll reschedule the cystoscopy based on the CT scan results.

## 2023-03-07 ENCOUNTER — HOSPITAL ENCOUNTER (OUTPATIENT)
Dept: RADIOLOGY | Facility: HOSPITAL | Age: 78
Discharge: HOME | End: 2023-03-07
Attending: UROLOGY
Payer: MEDICARE

## 2023-03-07 DIAGNOSIS — R31.29 MICROSCOPIC HEMATURIA: ICD-10-CM

## 2023-03-07 PROCEDURE — 74178 CT ABD&PLV WO CNTR FLWD CNTR: CPT | Mod: 26,,, | Performed by: INTERNAL MEDICINE

## 2023-03-07 PROCEDURE — 74178 CT UROGRAM ABD PELVIS W WO: ICD-10-PCS | Mod: 26,,, | Performed by: INTERNAL MEDICINE

## 2023-03-07 PROCEDURE — 74178 CT ABD&PLV WO CNTR FLWD CNTR: CPT | Mod: TC

## 2023-03-07 PROCEDURE — 25500020 PHARM REV CODE 255: Performed by: UROLOGY

## 2023-03-07 RX ADMIN — IOHEXOL 100 ML: 350 INJECTION, SOLUTION INTRAVENOUS at 04:03

## 2023-03-27 ENCOUNTER — OFFICE VISIT (OUTPATIENT)
Dept: OTOLARYNGOLOGY | Facility: CLINIC | Age: 78
End: 2023-03-27
Payer: MEDICARE

## 2023-03-27 ENCOUNTER — CLINICAL SUPPORT (OUTPATIENT)
Dept: AUDIOLOGY | Facility: CLINIC | Age: 78
End: 2023-03-27
Payer: MEDICARE

## 2023-03-27 VITALS
WEIGHT: 165.38 LBS | DIASTOLIC BLOOD PRESSURE: 82 MMHG | BODY MASS INDEX: 32.47 KG/M2 | HEIGHT: 60 IN | SYSTOLIC BLOOD PRESSURE: 130 MMHG

## 2023-03-27 DIAGNOSIS — K21.9 LARYNGOPHARYNGEAL REFLUX (LPR): ICD-10-CM

## 2023-03-27 DIAGNOSIS — D02.0 SQUAMOUS CELL CARCINOMA IN SITU (SCCIS) OF TRUE VOCAL CORD: Primary | ICD-10-CM

## 2023-03-27 DIAGNOSIS — H90.3 SENSORINEURAL HEARING LOSS (SNHL) OF BOTH EARS: ICD-10-CM

## 2023-03-27 DIAGNOSIS — R09.89 THROAT CLEARING: ICD-10-CM

## 2023-03-27 DIAGNOSIS — H93.13 TINNITUS OF BOTH EARS: ICD-10-CM

## 2023-03-27 DIAGNOSIS — H90.3 SENSORINEURAL HEARING LOSS (SNHL) OF BOTH EARS: Primary | ICD-10-CM

## 2023-03-27 DIAGNOSIS — R49.0 DYSPHONIA: ICD-10-CM

## 2023-03-27 PROCEDURE — 3288F FALL RISK ASSESSMENT DOCD: CPT | Mod: CPTII,S$GLB,, | Performed by: OTOLARYNGOLOGY

## 2023-03-27 PROCEDURE — 1159F MED LIST DOCD IN RCRD: CPT | Mod: CPTII,S$GLB,, | Performed by: OTOLARYNGOLOGY

## 2023-03-27 PROCEDURE — 92550 TYMPANOMETRY & REFLEX THRESH: CPT | Mod: 52,S$GLB,,

## 2023-03-27 PROCEDURE — 3079F DIAST BP 80-89 MM HG: CPT | Mod: CPTII,S$GLB,, | Performed by: OTOLARYNGOLOGY

## 2023-03-27 PROCEDURE — 92557 COMPREHENSIVE HEARING TEST: CPT | Mod: S$GLB,,,

## 2023-03-27 PROCEDURE — 92550 PR TYMPANOMETRY AND REFLEX THRESHOLD MEASUREMENTS: ICD-10-PCS | Mod: 52,S$GLB,,

## 2023-03-27 PROCEDURE — 3075F PR MOST RECENT SYSTOLIC BLOOD PRESS GE 130-139MM HG: ICD-10-PCS | Mod: CPTII,S$GLB,, | Performed by: OTOLARYNGOLOGY

## 2023-03-27 PROCEDURE — 99214 OFFICE O/P EST MOD 30 MIN: CPT | Mod: 25,S$GLB,, | Performed by: OTOLARYNGOLOGY

## 2023-03-27 PROCEDURE — 99214 PR OFFICE/OUTPT VISIT, EST, LEVL IV, 30-39 MIN: ICD-10-PCS | Mod: 25,S$GLB,, | Performed by: OTOLARYNGOLOGY

## 2023-03-27 PROCEDURE — 3288F PR FALLS RISK ASSESSMENT DOCUMENTED: ICD-10-PCS | Mod: CPTII,S$GLB,, | Performed by: OTOLARYNGOLOGY

## 2023-03-27 PROCEDURE — 31575 PR LARYNGOSCOPY, FLEXIBLE; DIAGNOSTIC: ICD-10-PCS | Mod: S$GLB,,, | Performed by: OTOLARYNGOLOGY

## 2023-03-27 PROCEDURE — 31575 DIAGNOSTIC LARYNGOSCOPY: CPT | Mod: S$GLB,,, | Performed by: OTOLARYNGOLOGY

## 2023-03-27 PROCEDURE — 1126F AMNT PAIN NOTED NONE PRSNT: CPT | Mod: CPTII,S$GLB,, | Performed by: OTOLARYNGOLOGY

## 2023-03-27 PROCEDURE — 3079F PR MOST RECENT DIASTOLIC BLOOD PRESSURE 80-89 MM HG: ICD-10-PCS | Mod: CPTII,S$GLB,, | Performed by: OTOLARYNGOLOGY

## 2023-03-27 PROCEDURE — 1126F PR PAIN SEVERITY QUANTIFIED, NO PAIN PRESENT: ICD-10-PCS | Mod: CPTII,S$GLB,, | Performed by: OTOLARYNGOLOGY

## 2023-03-27 PROCEDURE — 92557 PR COMPREHENSIVE HEARING TEST: ICD-10-PCS | Mod: S$GLB,,,

## 2023-03-27 PROCEDURE — 1101F PT FALLS ASSESS-DOCD LE1/YR: CPT | Mod: CPTII,S$GLB,, | Performed by: OTOLARYNGOLOGY

## 2023-03-27 PROCEDURE — 1159F PR MEDICATION LIST DOCUMENTED IN MEDICAL RECORD: ICD-10-PCS | Mod: CPTII,S$GLB,, | Performed by: OTOLARYNGOLOGY

## 2023-03-27 PROCEDURE — 1101F PR PT FALLS ASSESS DOC 0-1 FALLS W/OUT INJ PAST YR: ICD-10-PCS | Mod: CPTII,S$GLB,, | Performed by: OTOLARYNGOLOGY

## 2023-03-27 PROCEDURE — 3075F SYST BP GE 130 - 139MM HG: CPT | Mod: CPTII,S$GLB,, | Performed by: OTOLARYNGOLOGY

## 2023-03-27 NOTE — PROGRESS NOTES
OTOLARYNGOLOGY CLINIC NOTE  Date:  03/27/2023     Chief complaint:  Chief Complaint   Patient presents with    Follow-up     6 week follow up for cancer surveillance, little sore throat since then and ear pain    Cerumen Impaction     Very small amount of ear wax       History of Present Illness  Rachel Silver is a 77 y.o. female  presenting today for a followup.  Sore throat that swaps sides has been off and on for about 6 weeks . Last seen 9-21-22.   Ear ache mostly on right side , feels it more when sinuses are really bad. She can't take flonase   Throat clearing a lot  Takes nexium now; has been having worse reflux. Nexium gives her diarrhea   No pain with swallowing  Voice is same. Has raspy voice but unchanged    She underwent DL biopsy which was concerning for SCCa in situ, could not rule out invasive scca (6-11-20) who underwent microlaryngeal excision of vocal cord lesion with CO2 laser on 7-9-20 of her left true vocal fold. CO2 laser also used to vaporize resection bed. Pathology from cord excision showed moderate dysplasia with some crush artifact and sectioning artifact but did not appear to have any residual scca in situ.       Past Medical History  Past Medical History:   Diagnosis Date    Atrial fibrillation     Cataracts, bilateral     Hoarse     Hypertension     UTI (urinary tract infection)     Vaginal delivery     x3        Past Surgical History  Past Surgical History:   Procedure Laterality Date    CYSTOSCOPY      HYSTERECTOMY      LARYNGOSCOPY N/A 06/11/2020    Procedure: LARYNGOSCOPY;  Surgeon: Yesenia Durham MD;  Location: [facility] OR;  Service: ENT;  Laterality: N/A;  RN PRE OP, COVID NEGATIVE-- 6-8-2020 [place]  HAS-Cardiac Clearance today 6-8 [facility] [place]    MICROLARYNGOSCOPY N/A 07/09/2020    Procedure: MICROLARYNGOSCOPY;  Surgeon: Yesenia Durham MD;  Location: [facility] OR;  Service: ENT;  Laterality: N/A;    Microlaryngoscopy with biopsy vocal cords      TONSILLECTOMY      torn meniscus      VOCAL  FOLD LESION EXCISION  07/09/2020    Procedure: EXCISION, LESION, VOCAL CORD, USING LASER;  Surgeon: Yesenia Durham MD;  Location: [facility] OR;  Service: ENT;;        Medications  Current Outpatient Medications on File Prior to Visit   Medication Sig Dispense Refill    diltiaZEM HCl (CARDIZEM LA) 240 mg 24 hr tablet Take 1 tablet by mouth once daily 90 tablet 0    estradioL (ESTRACE) 0.01 % (0.1 mg/gram) vaginal cream Place 1 g vaginally 3 (three) times a week. Place by fingertip application before bedtime three times a week (Monday, Wednesday, Friday) 45 g 3    flecainide (TAMBOCOR) 100 MG Tab TAKE 1 TABLET BY MOUTH EVERY 12 HOURS 180 tablet 1    hydroCHLOROthiazide (HYDRODIURIL) 25 MG tablet Take 1 tablet by mouth once daily 90 tablet 3    rivaroxaban (XARELTO) 20 mg Tab Take 1 tablet (20 mg total) by mouth once daily. 90 tablet 3    rosuvastatin (CRESTOR) 40 MG Tab TAKE 1 TABLET BY MOUTH ONCE DAILY IN THE EVENING 90 tablet 3     Current Facility-Administered Medications on File Prior to Visit   Medication Dose Route Frequency Provider Last Rate Last Admin    doxycycline tablet 100 mg  100 mg Oral Once Leighann Marmolejo MD        sodium chloride 0.9% flush 10 mL  10 mL Intravenous PRN Yesenia Durham MD           Review of Systems  Review of Systems   Constitutional: Negative.    HENT:  Positive for ear pain, hearing loss and sore throat.    Eyes: Negative.    Respiratory:  Positive for cough.    Cardiovascular: Negative.    Genitourinary: Negative.    Skin: Negative.    Neurological: Negative.    Psychiatric/Behavioral: Negative.        Answers submitted by the patient for this visit:  Review of Symptoms Questionnaire  (Submitted on 3/27/2023)  sinus pressure : Yes  Sinus infection(s)?: Yes  Acid Reflux?: Yes  Seasonal Allergies?: Yes  Social History   reports that she quit smoking about 35 years ago. Her smoking use included cigarettes. She smoked an average of 1.5 packs per day. She has never used smokeless  tobacco. She reports current alcohol use. She reports that she does not use drugs.     Family History  Family History   Problem Relation Age of Onset    Heart failure Mother     Stroke Father     Stroke Brother     Heart disease Maternal Aunt     Heart disease Maternal Uncle     Heart disease Maternal Aunt     Heart disease Maternal Aunt     Heart disease Maternal Uncle         Physical Exam   Vitals:    03/27/23 0843   BP: 130/82    Body mass index is 32.29 kg/m².  Weight: 75 kg (165 lb 5.5 oz)   Height: 5' (152.4 cm)     GENERAL: no acute distress.  HEAD: normocephalic.   EYES: No scleral icterus  EARS: external ear without lesion, normal pinna shape and position.  External auditory canal with normal cerumen, tympanic membrane fully visible, no perforation , no retraction. No middle ear effusion. Ossicles intact.   NOSE: external nose without significant bony abnormality  ORAL CAVITY/OROPHARYNX: tongue mobile.   NECK: trachea midline.   LYMPH NODES:No cervical lymphadenopathy.  RESPIRATORY: no stridor, no stertor. Voice normal. Respirations nonlabored.  NEURO: alert, responds to questions appropriately.    PSYCH:mood appropriate    PROCEDURE NOTE  NAME OF PROCEDURE: Flexible Laryngoscopy, diagnostic  INDICATIONS: gag reflex precludes mirror exam,f/u history of scca in situ of vocal fold  FINDINGS: no recurrence, brisk gag reflex    Consent: After procedure was explained in detail and all questions answered, verbal consent was obtained for performing flexible laryngoscopy.  Anesthesia: topical 4% lidocaine and neosynephrine  Procedure: With patient in seated position, the scope was inserted into the bilateral nasal passageway and advanced atraumatically into the nasopharynx to examine the following structures:  Nasal cavity: no polyps. No purulent drainage.   Nasopharynx: no mass or lesion noted in nasopharynx.   Oropharynx: base of tongue without  mass or ulceration. Lingual tonsils normal in  appearance  Hypopharynx: posterior pharyngeal wall without mass or lesion. No pooling of secretions. Pyriform sinuses visible without mass or lesion  Larynx: epiglottis normal without lesion. False vocal folds without edema/erythema/lesion. True vocal folds mobile and without lesion. Moderate interarytenoid edema no erythema . Postcricoid region with Moderateedema no lesion   Subglottis: visualized portion of subglottis normal in appearance    After examination performed, the scope was removed atraumatically . The patient tolerated the procedure well. Photodocumentation obtained with representative images below, all images and/or videos uploaded in media section of epic.              AUDIOLOGY RESULTS  Audiometric evaluation including audiogram, tympanometry, acoustic reflexes, and speech discrimination which was performed  3-27-23 was personally reviewed and interpreted.  Notable findings on the audiogram were bilateral normal sloping to mild sensorineural hearing loss (SNHL) at 1khz and then steeply sloping to moderate and then severe SNHL. Left ear with similar pattern but with mild snhl at 250 hz.  Tympanometry revealed unable to seal on the left and Type A tympanogram on the right. Speech discrimination was 80%  on the left, and 80% on the right.     Report of the audiologist performing this audiometric testing was also reviewed   Imaging:  The patient does not have any new imaging of the head and neck since last visit.     Labs:  CBC  Recent Labs   Lab 06/08/20  0940   WBC 7.37   Hemoglobin 13.1   Hematocrit 37.7   MCV 85   Platelets 215     BMP  Recent Labs   Lab 07/07/20  1020 05/20/21  1205 02/10/23  0919   Glucose 99 93 100   Sodium 140 142 139   Potassium 3.9 3.5 3.5   Chloride 104 104 103   CO2 30 H 31 H 26   BUN 15 13 13   Creatinine 0.9 0.9 0.8   Calcium 9.5 10.0 9.5     COAGS        Assessment  1. 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 Laryngopharyngeal reflux (LPR)    3.  Dysphonia    4. Sensorineural hearing loss (SNHL) of both ears    5. Throat clearing       Plan:  Discussed plan of care with patient in detail and all questions answered. Patient reported understanding of plan of care.   Bilateral sensorineural hearing loss: medically cleared for hearing amplification if desired. Findings consistent with age related/noise induced hearing loss (presbycusis) .Counseled patient that  formal audiogram should be done q1-2 years and immediately if has a sudden loss of hearing. Pt should wear noise protection when in an environment with loud noise exposure to protect from future hearing loss.  Patient  has people's health insurance. instructed patient to call insurance company to see if they are a candidate for discount hearing aid at true hearing location. Can also fit for HA here but no discount ability.     Throat clearing: Discussed about pathophysiology of throat clearing and dysphonia as well as differential diagnosis and mgmt options. Recommend pectin lozenges, gaviscon liquid otc prn, xylimelts and bedside humidifier. Given info about gargle and facail steamer as well. Discussed about importance of SLP in teaching exercises to prevent throat clearing however patient would like to try above recommendations first and see SLP if issue persists. Discussed vocal hygiene and that important to stay hydrated, avoid drying medications/caffeine as much as possible, and to avoid whispering and shouting. Some patients do need to do full voice rest to improve the voice      History of scca in situ: no recurrence     F/u 4 months with flex for monitoring of history of squamous cell carcinoma in situ    Please be aware that this note has been generated with the assistance of [employer] voice-to-text.  Please excuse any spelling or grammatical errors.

## 2023-03-27 NOTE — PROGRESS NOTES
Ms. Rachel Silver, a 77 y.o. female, was seen in the clinic today for an audiological evaluation. Ms. Silver reported bilateral hearing loss and intermittent bilateral tinnitus. Denied aural fullness, otalgia, and dizziness.     Otoscopy clear for the right ear; moderately occluding cerumen noted for the left ear. Audiological testing revealed normal hearing sloping to a mild to severe sensorineural hearing loss for the right ear and a mild low frequency sensorineural hearing loss rising to normal hearing sloping to a mild to severe sensorineural hearing loss for the left ear. A speech reception threshold was obtained at 30 dBHL for the right ear and at 25 dBHL for the left ear. Speech discrimination was 80% for the right ear and 80% for the left ear.      Tympanometry testing revealed a Type A tympanogram for the right ear. Could not obtain tympanogram and ipsilateral acoustic reflexes for the left ear due to lack of hermetic seal. Ipsilateral acoustic reflexes were present at 500 Hz for the right ear.    Recommendations:  1. Otologic evaluation  2. Annual audiological evaluation or sooner if hearing changes  3. Hearing protection when in noise   4. Hearing aid consultation following medical clearance    Please click on link to view Audiogram:  Document on 3/27/2023  9:47 AM by MARY Godinez: Audiogram

## 2023-04-10 ENCOUNTER — PROCEDURE VISIT (OUTPATIENT)
Dept: UROLOGY | Facility: CLINIC | Age: 78
End: 2023-04-10
Payer: MEDICARE

## 2023-04-10 ENCOUNTER — TELEPHONE (OUTPATIENT)
Dept: UROLOGY | Facility: CLINIC | Age: 78
End: 2023-04-10
Payer: MEDICARE

## 2023-04-10 VITALS
WEIGHT: 165.81 LBS | HEART RATE: 68 BPM | BODY MASS INDEX: 32.38 KG/M2 | SYSTOLIC BLOOD PRESSURE: 158 MMHG | RESPIRATION RATE: 20 BRPM | TEMPERATURE: 98 F | DIASTOLIC BLOOD PRESSURE: 79 MMHG

## 2023-04-10 DIAGNOSIS — Q62.11 HYDRONEPHROSIS WITH URETEROPELVIC JUNCTION (UPJ) OBSTRUCTION: Primary | ICD-10-CM

## 2023-04-10 DIAGNOSIS — R31.29 MICROSCOPIC HEMATURIA: ICD-10-CM

## 2023-04-10 PROCEDURE — 52000 CYSTOURETHROSCOPY: CPT | Mod: S$GLB,,, | Performed by: UROLOGY

## 2023-04-10 PROCEDURE — 52000 PR CYSTOURETHROSCOPY: ICD-10-PCS | Mod: S$GLB,,, | Performed by: UROLOGY

## 2023-04-10 RX ORDER — LIDOCAINE HYDROCHLORIDE 20 MG/ML
JELLY TOPICAL
Status: COMPLETED | OUTPATIENT
Start: 2023-04-10 | End: 2023-04-10

## 2023-04-10 RX ADMIN — LIDOCAINE HYDROCHLORIDE: 20 JELLY TOPICAL at 09:04

## 2023-04-10 NOTE — Clinical Note
I referred this patient to Dr. Castro to evaluate the right ureter.  Please reach out to her for an appointment.  Thanks!

## 2023-04-10 NOTE — PROCEDURES
Procedures    CYSTOSCOPY REPORT    Pre Procedure Diagnosis:  microscopic hematuria    Post Procedure Diagnosis:  normal lower urinary tract    Anesthesia: 10 cc 2% lidocaine jelly applied per urethra.    14 FR Flexible Olympus cystoscope used.    FINDINGS:  Dome, anterior, posterior, lateral walls and bladder base free of urothelial abnormalities. Right and left ureteral orifices in the normal postion and configuration, both effluxed clear urine.  Bladder neck and urethra were normal.    Specimen:   none    The patient was taken to the cystoscopy suite and placed in dorsal lithotomy position.  The genitalia was prepped and draped  in the usual sterile fashion.  Two percent lidocaine jelly was inserted in the urethra.  After sufficent time had passed to allow good local anesthesia, the cystoscope was inserted in the urethra and passed into the bladder visualizing the urethra along its entire course.  The dome, anterior, posterior and lateral walls were examined systematically.  The ureteral orifices were in their usual position and configuration.  The cystoscope was turned upon itself 180 degrees to visualize the bladder neck.  The cystoscope was then brought to the level of the bladder neck, the water was turned on and the urethra was visualized.  The cystoscope was removed and the patient was instructed to urinate prior to leaving the office.     Post procedure medication:  doxycycline 100 mg x 1    ADDITIONAL NOTES:  there is some tortuosity of the proximal ureter on the CT urogram.  Calcifications on the pancreatic head that they are recommending follow up in 2 years. Will alert Dr. Tu Marquis.      ASSESSMENT/PLAN:  77 year old woman status post flexible cystoscopy.  1. Push fluids for 24 hours.  2. May see blood in the urine, this should gradually improve over the next 2-3 days.  3. The patient was instructed to return to the office or go to the emergency should fever, chills, cloudy urine, or  inability to urinate develop.  4. Follow up will refer to Dr. Castro for consideration of addressing the proximal ureter.

## 2023-04-10 NOTE — TELEPHONE ENCOUNTER
I SWP Rachel now and scheduled per below.  Thank you.  4/26/2023 Status: Octavia    Appt Time: 9:45 AM Length: 15   Visit Type: ESTABLISHED PATIENT [5708] Copay: $20.00   Provider: Pal Castro MD       ----- Message from Leighann Marmolejo MD sent at 4/10/2023 10:17 AM CDT -----  I referred this patient to Dr. Castro to evaluate the right ureter.  Please reach out to her for an appointment.  Thanks!

## 2023-04-10 NOTE — LETTER
April 10, 2023        Tu Marquis MD  [address] [place] [address] - Urology  [address] [place] [id number]  Phone: [phone]   Patient: Rachel Silver   MR Number: [id number]   YOB: 1945   Date of Visit: 4/10/2023       Dear Dr. Silver:    Mrs. Silver had a workup for microscopic hematuria.  In the course of the workup, she was found to have microcalcifications of the pancreatic head for which it was recommended to have repeat imaging in 2 years.  I told her that I would let you know of the findings.  I have included a copy of the report with this letter.    I have also referred her to Dr. Pal Castro for evaluation of the proximal ureter, on the right side.  There is some tortuosity that is likely contributing to the hydronephrosis. Her cystoscopy was negative.    If you have questions, please do not hesitate to call me. I look forward to following Rachel along with you.    Sincerely,      Leighann Marmolejo MD           CC  No Recipients

## 2023-04-10 NOTE — PATIENT INSTRUCTIONS
What to Expect After a Cystoscopy  For the next 24-48 hours, you may feel a mild burning when you urinate. This burning is normal and expected. Drink plenty of water to dilute the urine to help relieve the burning sensation. You may also see a small amount of blood in your urine after the procedure.    Unless you are already taking antibiotics, you may be given an antibiotic after the test to prevent infection.    Signs and Symptoms to Report  Call the [facility] at [phone] if you develop any of the following:  Fever of 101 degrees or higher  Chills or persistent bleeding  Inability to urinate

## 2023-05-16 RX ORDER — DILTIAZEM HYDROCHLORIDE EXTENDED-RELEASE TABLETS 240 MG/1
TABLET, EXTENDED RELEASE ORAL
Qty: 90 TABLET | Refills: 0 | Status: SHIPPED | OUTPATIENT
Start: 2023-05-16 | End: 2023-08-14

## 2023-06-02 RX ORDER — HYDROCHLOROTHIAZIDE 25 MG/1
25 TABLET ORAL DAILY
Qty: 90 TABLET | Refills: 3 | Status: SHIPPED | OUTPATIENT
Start: 2023-06-02

## 2023-06-16 ENCOUNTER — OFFICE VISIT (OUTPATIENT)
Dept: CARDIOLOGY | Facility: CLINIC | Age: 78
End: 2023-06-16
Payer: MEDICARE

## 2023-06-16 ENCOUNTER — HOSPITAL ENCOUNTER (OUTPATIENT)
Dept: CARDIOLOGY | Facility: CLINIC | Age: 78
Discharge: HOME | End: 2023-06-16
Payer: MEDICARE

## 2023-06-16 VITALS
SYSTOLIC BLOOD PRESSURE: 131 MMHG | DIASTOLIC BLOOD PRESSURE: 67 MMHG | OXYGEN SATURATION: 98 % | HEART RATE: 58 BPM | HEIGHT: 60 IN | WEIGHT: 163.38 LBS | BODY MASS INDEX: 32.08 KG/M2

## 2023-06-16 DIAGNOSIS — Z86.79 HISTORY OF CARDIOMYOPATHY: ICD-10-CM

## 2023-06-16 DIAGNOSIS — Z01.810 PRE-OPERATIVE CARDIOVASCULAR EXAMINATION: ICD-10-CM

## 2023-06-16 DIAGNOSIS — I70.0 AORTIC ATHEROSCLEROSIS: ICD-10-CM

## 2023-06-16 DIAGNOSIS — Z82.49 FAMILY HISTORY OF CAROTID ENDARTERECTOMY: ICD-10-CM

## 2023-06-16 DIAGNOSIS — Z87.891 EX-SMOKER: ICD-10-CM

## 2023-06-16 DIAGNOSIS — R94.31 ST SEGMENT DEPRESSION: ICD-10-CM

## 2023-06-16 DIAGNOSIS — I10 ESSENTIAL HYPERTENSION: ICD-10-CM

## 2023-06-16 DIAGNOSIS — R07.89 OTHER CHEST PAIN: ICD-10-CM

## 2023-06-16 DIAGNOSIS — I48.0 PAF (PAROXYSMAL ATRIAL FIBRILLATION): ICD-10-CM

## 2023-06-16 DIAGNOSIS — I10 ESSENTIAL HYPERTENSION: Primary | ICD-10-CM

## 2023-06-16 DIAGNOSIS — Z86.79 HISTORY OF ASCVD: ICD-10-CM

## 2023-06-16 DIAGNOSIS — R09.89 OTHER SPECIFIED SYMPTOMS AND SIGNS INVOLVING THE CIRCULATORY AND RESPIRATORY SYSTEMS: ICD-10-CM

## 2023-06-16 DIAGNOSIS — E78.49 OTHER HYPERLIPIDEMIA: ICD-10-CM

## 2023-06-16 PROCEDURE — 93005 EKG 12-LEAD: ICD-10-PCS | Mod: S$GLB,,, | Performed by: INTERNAL MEDICINE

## 2023-06-16 PROCEDURE — 1160F RVW MEDS BY RX/DR IN RCRD: CPT | Mod: CPTII,S$GLB,, | Performed by: INTERNAL MEDICINE

## 2023-06-16 PROCEDURE — 1126F PR PAIN SEVERITY QUANTIFIED, NO PAIN PRESENT: ICD-10-PCS | Mod: CPTII,S$GLB,, | Performed by: INTERNAL MEDICINE

## 2023-06-16 PROCEDURE — 3075F SYST BP GE 130 - 139MM HG: CPT | Mod: CPTII,S$GLB,, | Performed by: INTERNAL MEDICINE

## 2023-06-16 PROCEDURE — 1159F PR MEDICATION LIST DOCUMENTED IN MEDICAL RECORD: ICD-10-PCS | Mod: CPTII,S$GLB,, | Performed by: INTERNAL MEDICINE

## 2023-06-16 PROCEDURE — 3078F DIAST BP <80 MM HG: CPT | Mod: CPTII,S$GLB,, | Performed by: INTERNAL MEDICINE

## 2023-06-16 PROCEDURE — 93010 ELECTROCARDIOGRAM REPORT: CPT | Mod: S$GLB,,, | Performed by: INTERNAL MEDICINE

## 2023-06-16 PROCEDURE — 93005 ELECTROCARDIOGRAM TRACING: CPT | Mod: S$GLB,,, | Performed by: INTERNAL MEDICINE

## 2023-06-16 PROCEDURE — 93010 EKG 12-LEAD: ICD-10-PCS | Mod: S$GLB,,, | Performed by: INTERNAL MEDICINE

## 2023-06-16 PROCEDURE — 99214 PR OFFICE/OUTPT VISIT, EST, LEVL IV, 30-39 MIN: ICD-10-PCS | Mod: S$GLB,,, | Performed by: INTERNAL MEDICINE

## 2023-06-16 PROCEDURE — 99999 PR PBB SHADOW E&M-EST. PATIENT-LVL IV: ICD-10-PCS | Mod: PBBFAC,,, | Performed by: INTERNAL MEDICINE

## 2023-06-16 PROCEDURE — 3078F PR MOST RECENT DIASTOLIC BLOOD PRESSURE < 80 MM HG: ICD-10-PCS | Mod: CPTII,S$GLB,, | Performed by: INTERNAL MEDICINE

## 2023-06-16 PROCEDURE — 3075F PR MOST RECENT SYSTOLIC BLOOD PRESS GE 130-139MM HG: ICD-10-PCS | Mod: CPTII,S$GLB,, | Performed by: INTERNAL MEDICINE

## 2023-06-16 PROCEDURE — 99999 PR PBB SHADOW E&M-EST. PATIENT-LVL IV: CPT | Mod: PBBFAC,,, | Performed by: INTERNAL MEDICINE

## 2023-06-16 PROCEDURE — 1159F MED LIST DOCD IN RCRD: CPT | Mod: CPTII,S$GLB,, | Performed by: INTERNAL MEDICINE

## 2023-06-16 PROCEDURE — 99214 OFFICE O/P EST MOD 30 MIN: CPT | Mod: S$GLB,,, | Performed by: INTERNAL MEDICINE

## 2023-06-16 PROCEDURE — 1160F PR REVIEW ALL MEDS BY PRESCRIBER/CLIN PHARMACIST DOCUMENTED: ICD-10-PCS | Mod: CPTII,S$GLB,, | Performed by: INTERNAL MEDICINE

## 2023-06-16 PROCEDURE — 1126F AMNT PAIN NOTED NONE PRSNT: CPT | Mod: CPTII,S$GLB,, | Performed by: INTERNAL MEDICINE

## 2023-06-16 NOTE — PROGRESS NOTES
Subjective:   Patient ID:  Rachel Silver is a 77 y.o. female who presents for follow-up of Hypertension  Rachel Silver is a 75 y.o. female who presents for follow-up of Essential hypertension (1 yr fu)    HPI:   Patient had chest pain going to the back radiating to the back. Patient was going up a hill and felt back pain and radiated to the chest. She had a regular treadmill stress test.   Ex smoker  Mother had CHF, uncle  of MI at the age of 48.  BP at home 152 or so.      ASCVD risk is 23%  EKG:  Sinus rhythm with T wave inversions and ST depression precordial leads.   T chol 250  HDL 60, TG 74     The ASCVD Risk score ([place] EDGAR Jr., et al., 2013) failed to calculate for the following reasons:    Cannot find a previous HDL lab    Cannot find a previous total cholesterol lab     PET:   The relative PET images are normal showing no clinically significant regional resting or stress induced perfusion defects.    Whole heart absolute myocardial perfusion (cc/min/g) averaged 1.17  at rest (which is elevated), 1.66 cc/min/g at stress (which is mildly reduced), and 1.81 cc/min/g CFR (which is mildly reduced in part due to elevated resting flow).    Visually estimated ejection fraction is normal at rest and normal at stress.    Wall motion was normal at rest and during stress.    LV cavity size is normal at rest and stress.    The EKG portion of this study is negative for ischemia.    Arrhythmias during stress: frequent PVCs.    The patient reported no chest pain during the stress test.    There are no prior studies for comparison.     Echo  AF with RVR noted.  Mildly decreased left ventricular systolic function. The estimated ejection fraction is 40%. Significant beat to beat variability in the setting of AF. EF may be underestimated.  Indeterminate left ventricular diastolic function.  Normal right ventricular systolic function.  Mild left atrial enlargement.  The estimated PA systolic pressure is 22 mm  Hg  Normal central venous pressure (3 mm Hg).  DSE aborted due to fast AF. Findings and plan discussed with the patient.  ECHO   Normal left ventricular systolic function. The estimated ejection fraction is 60%  Indeterminate left ventricular diastolic function.  No wall motion abnormalities.  Normal right ventricular systolic function.  Mild left atrial enlargement.  The estimated PA systolic pressure is 22 mm Hg  Normal central venous pressure (3 mm Hg).     HPI:   Patient is exercising patient does lion dancing.  No chest pain, Orthopnea, PND of heart failure symptoms.   Patient had minimal palpitation.      HPI:   Patient is very active.   Palpitations have improved since she was started on flecainide.   No chest pain, Orthopnea, PND of heart failure symptoms.   BP has been normal so far.     HPI:   Patient swims and does line dancing  She is very active with grand kids  No chest pain, Orthopnea, PND of heart failure symptoms.   Denies palpitations or fluttering in the chest  No dizziness or LOC  Ex smoker 20 py  Mother had CHF, uncle  of MI at the age of 48.    Patient Active Problem List   Diagnosis    Hoarseness    Other chest pain    ST segment depression    T wave inversion in EKG    History of ASCVD    Essential hypertension    PAF (paroxysmal atrial fibrillation)    HLD (hyperlipidemia)    Lesion of true vocal cord    Incomplete bladder emptying    Recurrent UTI    Hydronephrosis of right kidney     /67   Pulse (!) 58   Ht 5' (1.524 m)   Wt 74.1 kg (163 lb 5.8 oz)   SpO2 98%   BMI 31.90 kg/m²   Body mass index is 31.9 kg/m².  CrCl cannot be calculated (Patient's most recent lab result is older than the maximum 7 days allowed.).    Lab Results   Component Value Date     02/10/2023    K 3.5 02/10/2023     02/10/2023    CO2 26 02/10/2023    BUN 13 02/10/2023    CREATININE 0.8 02/10/2023     02/10/2023    AST 33 2019    ALT 35 2019    ALBUMIN 4.3 2019     PROT 7.7 09/06/2019    BILITOT 0.5 09/06/2019    WBC 7.37 06/08/2020    HGB 13.1 06/08/2020    HCT 37.7 06/08/2020    MCV 85 06/08/2020     06/08/2020    TSH 1.335 12/27/2019    CHOL 124 05/22/2023    CHOL 136 09/06/2019    HDL 53 05/22/2023    HDL 60 09/06/2019    LDLCALC 61 05/22/2023    LDLCALC 66.2 09/06/2019    TRIG 61 05/22/2023    TRIG 49 09/06/2019       Current Outpatient Medications   Medication Sig    diltiaZEM HCl (CARDIZEM LA) 240 mg 24 hr tablet Take 1 tablet by mouth once daily    flecainide (TAMBOCOR) 100 MG Tab TAKE 1 TABLET BY MOUTH EVERY 12 HOURS    hydroCHLOROthiazide (HYDRODIURIL) 25 MG tablet Take 1 tablet (25 mg total) by mouth once daily.    rivaroxaban (XARELTO) 20 mg Tab Take 1 tablet (20 mg total) by mouth once daily.    rosuvastatin (CRESTOR) 40 MG Tab TAKE 1 TABLET BY MOUTH ONCE DAILY IN THE EVENING    estradioL (ESTRACE) 0.01 % (0.1 mg/gram) vaginal cream Place 1 g vaginally 3 (three) times a week. Place by fingertip application before bedtime three times a week (Monday, Wednesday, Friday) (Patient not taking: Reported on 6/16/2023)     Current Facility-Administered Medications   Medication    doxycycline tablet 100 mg    sodium chloride 0.9% flush 10 mL       Review of Systems   Constitutional: Negative for chills, decreased appetite, malaise/fatigue, night sweats, weight gain and weight loss.   Eyes:  Negative for blurred vision, double vision, visual disturbance and visual halos.   Cardiovascular:  Negative for chest pain, claudication, cyanosis, dyspnea on exertion, irregular heartbeat, leg swelling, near-syncope, orthopnea, palpitations, paroxysmal nocturnal dyspnea and syncope.   Respiratory:  Negative for cough, hemoptysis, snoring, sputum production and wheezing.    Endocrine: Negative for cold intolerance, heat intolerance, polydipsia and polyphagia.   Hematologic/Lymphatic: Negative for adenopathy and bleeding problem. Does not bruise/bleed easily.   Skin:  Negative for  flushing, itching, poor wound healing and rash.   Musculoskeletal:  Positive for arthritis. Negative for back pain, falls, gout, joint pain, joint swelling, muscle cramps, muscle weakness, myalgias, neck pain and stiffness.   Gastrointestinal:  Negative for bloating, abdominal pain, anorexia, diarrhea, dysphagia, excessive appetite, flatus, hematemesis, jaundice, melena and nausea.   Genitourinary:  Negative for hesitancy and incomplete emptying.   Neurological:  Negative for aphonia, brief paralysis, difficulty with concentration, disturbances in coordination, excessive daytime sleepiness, dizziness, focal weakness, light-headedness, loss of balance and weakness.   Psychiatric/Behavioral:  Negative for altered mental status, depression, hallucinations, hypervigilance, memory loss, substance abuse and suicidal ideas. The patient does not have insomnia and is not nervous/anxious.      Objective:   Physical Exam  Constitutional:       General: She is not in acute distress.     Appearance: She is well-developed. She is not diaphoretic.   HENT:      Head: Normocephalic and atraumatic.      Nose: Nose normal.      Mouth/Throat:      Pharynx: No oropharyngeal exudate.   Eyes:      General: No scleral icterus.        Right eye: No discharge.         Left eye: No discharge.      Conjunctiva/sclera: Conjunctivae normal.      Pupils: Pupils are equal, round, and reactive to light.   Neck:      Thyroid: No thyromegaly.      Vascular: No JVD.      Trachea: No tracheal deviation.   Cardiovascular:      Rate and Rhythm: Normal rate and regular rhythm.      Pulses: Intact distal pulses.      Heart sounds: Normal heart sounds. No murmur heard.    No friction rub. No gallop.   Pulmonary:      Effort: Pulmonary effort is normal. No respiratory distress.      Breath sounds: Normal breath sounds. No stridor. No wheezing or rales.   Chest:      Chest wall: No tenderness.   Abdominal:      General: Bowel sounds are normal. There is no  distension.      Palpations: Abdomen is soft. There is no mass.      Tenderness: There is no abdominal tenderness. There is no guarding or rebound.   Musculoskeletal:         General: No tenderness. Normal range of motion.      Cervical back: Normal range of motion and neck supple.   Lymphadenopathy:      Cervical: No cervical adenopathy.   Skin:     General: Skin is warm.      Coloration: Skin is not pale.      Findings: No erythema or rash.   Neurological:      Mental Status: She is alert and oriented to person, place, and time.      Cranial Nerves: No cranial nerve deficit.      Motor: No abnormal muscle tone.      Coordination: Coordination normal.      Deep Tendon Reflexes: Reflexes are normal and symmetric. Reflexes normal.   Psychiatric:         Behavior: Behavior normal.         Thought Content: Thought content normal.         Judgment: Judgment normal.       Assessment:     1. Essential hypertension    2. History of ASCVD    3. Other hyperlipidemia    4. Other chest pain    5. PAF (paroxysmal atrial fibrillation)    6. Pre-operative cardiovascular examination    7. ST segment depression        Plan:   Rachel was seen today for hypertension.    Diagnoses and all orders for this visit:    Essential hypertension    History of ASCVD    Other hyperlipidemia    Other chest pain    PAF (paroxysmal atrial fibrillation)  -     Echo Saline Bubble? No; Future    Pre-operative cardiovascular examination    ST segment depression    Family history of carotid endarterectomy  -     CV Ultrasound Bilateral Doppler Carotid; Future    Ex-smoker  -     CV Ultrasound Bilateral Doppler Carotid; Future  -     Echo Saline Bubble? No; Future    Aortic atherosclerosis    Other specified symptoms and signs involving the circulatory and respiratory systems  -     CV Ultrasound Bilateral Doppler Carotid; Future    History of cardiomyopathy  -     Echo Saline Bubble? No; Future      Counseled on importance of heart healthy diet low in  saturated and trans fat and salt as well gradually starting a regular aerobic exercise regimen with goal of 30min 5x/week. Recommend BP diary. Call if systolic BP > 130 mmHg on checking repeatedly

## 2023-07-03 ENCOUNTER — HOSPITAL ENCOUNTER (OUTPATIENT)
Dept: CARDIOLOGY | Facility: HOSPITAL | Age: 78
Discharge: HOME | End: 2023-07-03
Attending: INTERNAL MEDICINE
Payer: MEDICARE

## 2023-07-03 VITALS
WEIGHT: 163 LBS | DIASTOLIC BLOOD PRESSURE: 67 MMHG | HEART RATE: 53 BPM | SYSTOLIC BLOOD PRESSURE: 130 MMHG | BODY MASS INDEX: 32 KG/M2 | HEIGHT: 60 IN

## 2023-07-03 DIAGNOSIS — Z87.891 EX-SMOKER: ICD-10-CM

## 2023-07-03 DIAGNOSIS — R09.89 OTHER SPECIFIED SYMPTOMS AND SIGNS INVOLVING THE CIRCULATORY AND RESPIRATORY SYSTEMS: ICD-10-CM

## 2023-07-03 DIAGNOSIS — I48.0 PAF (PAROXYSMAL ATRIAL FIBRILLATION): ICD-10-CM

## 2023-07-03 DIAGNOSIS — Z86.79 HISTORY OF CARDIOMYOPATHY: ICD-10-CM

## 2023-07-03 DIAGNOSIS — Z82.49 FAMILY HISTORY OF CAROTID ENDARTERECTOMY: ICD-10-CM

## 2023-07-03 LAB
ASCENDING AORTA: 3.28 CM
ASCENDING AORTA: 3.28 CM
AV INDEX (PROSTH): 0.73
AV INDEX (PROSTH): 0.73
AV MEAN GRADIENT: 4 MMHG
AV MEAN GRADIENT: 4 MMHG
AV PEAK GRADIENT: 8 MMHG
AV PEAK GRADIENT: 8 MMHG
AV VALVE AREA: 2.32 CM2
AV VALVE AREA: 2.32 CM2
AV VELOCITY RATIO: 0.69
AV VELOCITY RATIO: 0.69
BSA FOR ECHO PROCEDURE: 1.77 M2
CV ECHO LV RWT: 0.32 CM
CV ECHO LV RWT: 0.32 CM
DOP CALC AO PEAK VEL: 1.37 M/S
DOP CALC AO PEAK VEL: 1.37 M/S
DOP CALC AO VTI: 30.6 CM
DOP CALC AO VTI: 30.61 CM
DOP CALC LVOT AREA: 3.2 CM2
DOP CALC LVOT AREA: 3.2 CM2
DOP CALC LVOT DIAMETER: 2.01 CM
DOP CALC LVOT DIAMETER: 2.01 CM
DOP CALC LVOT PEAK VEL: 0.95 M/S
DOP CALC LVOT PEAK VEL: 0.95 M/S
DOP CALC LVOT STROKE VOLUME: 70.95 CM3
DOP CALC LVOT STROKE VOLUME: 71.04 CM3
DOP CALCLVOT PEAK VEL VTI: 22.37 CM
DOP CALCLVOT PEAK VEL VTI: 22.4 CM
E WAVE DECELERATION TIME: 291.32 MSEC
E WAVE DECELERATION TIME: 291.32 MSEC
E/A RATIO: 0.62
E/A RATIO: 0.62
E/E' RATIO: 9.45 M/S
E/E' RATIO: 9.45 M/S
ECHO LV POSTERIOR WALL: 0.77 CM (ref 0.6–1.1)
ECHO LV POSTERIOR WALL: 0.77 CM (ref 0.6–1.1)
EJECTION FRACTION: 63 %
FRACTIONAL SHORTENING: 33 % (ref 28–44)
FRACTIONAL SHORTENING: 33 % (ref 28–44)
INTERVENTRICULAR SEPTUM: 0.69 CM (ref 0.6–1.1)
INTERVENTRICULAR SEPTUM: 0.69 CM (ref 0.6–1.1)
IVC DIAMETER: 1.55 CM
IVRT: 145.58 MSEC
IVRT: 145.58 MSEC
LA MAJOR: 4.46 CM
LA MAJOR: 4.46 CM
LA MINOR: 5.34 CM
LA MINOR: 5.34 CM
LA WIDTH: 4.31 CM
LA WIDTH: 4.31 CM
LEFT ARM DIASTOLIC BLOOD PRESSURE: 72 MMHG
LEFT ARM SYSTOLIC BLOOD PRESSURE: 136 MMHG
LEFT ATRIUM SIZE: 4 CM
LEFT ATRIUM VOLUME INDEX MOD: 35.6 ML/M2
LEFT ATRIUM VOLUME INDEX MOD: 36.8 ML/M2
LEFT ATRIUM VOLUME INDEX: 41.7 ML/M2
LEFT ATRIUM VOLUME MOD: 60.83 CM3
LEFT ATRIUM VOLUME MOD: 62.95 CM3
LEFT ATRIUM VOLUME: 71.23 CM3
LEFT CBA DIAS: 16 CM/S
LEFT CBA SYS: 45 CM/S
LEFT CCA DIST DIAS: 12 CM/S
LEFT CCA DIST SYS: 44 CM/S
LEFT CCA MID DIAS: 13 CM/S
LEFT CCA MID SYS: 50 CM/S
LEFT CCA PROX DIAS: 13 CM/S
LEFT CCA PROX SYS: 62 CM/S
LEFT ECA DIAS: 11 CM/S
LEFT ECA SYS: 73 CM/S
LEFT ICA DIST DIAS: 30 CM/S
LEFT ICA DIST SYS: 80 CM/S
LEFT ICA MID DIAS: 18 CM/S
LEFT ICA MID SYS: 59 CM/S
LEFT ICA PROX DIAS: 17 CM/S
LEFT ICA PROX SYS: 55 CM/S
LEFT INTERNAL DIMENSION IN SYSTOLE: 3.24 CM (ref 2.1–4)
LEFT INTERNAL DIMENSION IN SYSTOLE: 3.24 CM (ref 2.1–4)
LEFT VENTRICLE DIASTOLIC VOLUME INDEX: 65.05 ML/M2
LEFT VENTRICLE DIASTOLIC VOLUME INDEX: 65.05 ML/M2
LEFT VENTRICLE DIASTOLIC VOLUME: 111.24 ML
LEFT VENTRICLE DIASTOLIC VOLUME: 111.24 ML
LEFT VENTRICLE MASS INDEX: 68 G/M2
LEFT VENTRICLE MASS INDEX: 68 G/M2
LEFT VENTRICLE SYSTOLIC VOLUME INDEX: 24.8 ML/M2
LEFT VENTRICLE SYSTOLIC VOLUME INDEX: 24.8 ML/M2
LEFT VENTRICLE SYSTOLIC VOLUME: 42.33 ML
LEFT VENTRICLE SYSTOLIC VOLUME: 42.33 ML
LEFT VENTRICULAR INTERNAL DIMENSION IN DIASTOLE: 4.87 CM (ref 3.5–6)
LEFT VENTRICULAR INTERNAL DIMENSION IN DIASTOLE: 4.87 CM (ref 3.5–6)
LEFT VENTRICULAR MASS: 115.53 G
LEFT VENTRICULAR MASS: 115.53 G
LEFT VERTEBRAL DIAS: 21 CM/S
LEFT VERTEBRAL SYS: 89 CM/S
LV LATERAL E/E' RATIO: 8.67 M/S
LV LATERAL E/E' RATIO: 8.67 M/S
LV SEPTAL E/E' RATIO: 10.4 M/S
LV SEPTAL E/E' RATIO: 10.4 M/S
LVOT MG: 1.74 MMHG
LVOT MV: 0.61 CM/S
MV A" WAVE DURATION": 15.41 MSEC
MV A" WAVE DURATION": 15.41 MSEC
MV PEAK A VEL: 0.84 M/S
MV PEAK A VEL: 0.84 M/S
MV PEAK E VEL: 0.52 M/S
MV PEAK E VEL: 0.52 M/S
MV STENOSIS PRESSURE HALF TIME: 84.48 MS
MV STENOSIS PRESSURE HALF TIME: 84.48 MS
MV VALVE AREA P 1/2 METHOD: 2.6 CM2
MV VALVE AREA P 1/2 METHOD: 2.6 CM2
OHS CV CAROTID RIGHT ICA EDV HIGHEST: 25
OHS CV CAROTID ULTRASOUND LEFT ICA/CCA RATIO: 1.82
OHS CV CAROTID ULTRASOUND RIGHT ICA/CCA RATIO: 1.46
OHS CV PV CAROTID LEFT HIGHEST CCA: 62
OHS CV PV CAROTID LEFT HIGHEST ICA: 80
OHS CV PV CAROTID RIGHT HIGHEST CCA: 58
OHS CV PV CAROTID RIGHT HIGHEST ICA: 79
OHS CV US CAROTID LEFT HIGHEST EDV: 30
PISA TR MAX VEL: 2.52 M/S
PISA TR MAX VEL: 2.52 M/S
PULM VEIN S/D RATIO: 1.46
PULM VEIN S/D RATIO: 1.46
PV PEAK D VEL: 0.35 M/S
PV PEAK D VEL: 0.35 M/S
PV PEAK S VEL: 0.51 M/S
PV PEAK S VEL: 0.51 M/S
RA MAJOR: 5.42 CM
RA MAJOR: 5.42 CM
RA PRESSURE: 3 MMHG
RA WIDTH: 3.84 CM
RA WIDTH: 3.84 CM
RIGHT ARM DIASTOLIC BLOOD PRESSURE: 74 MMHG
RIGHT ARM SYSTOLIC BLOOD PRESSURE: 152 MMHG
RIGHT CBA DIAS: 14 CM/S
RIGHT CBA SYS: 43 CM/S
RIGHT CCA DIST DIAS: 17 CM/S
RIGHT CCA DIST SYS: 54 CM/S
RIGHT CCA MID DIAS: 17 CM/S
RIGHT CCA MID SYS: 58 CM/S
RIGHT CCA PROX DIAS: 13 CM/S
RIGHT CCA PROX SYS: 56 CM/S
RIGHT ECA DIAS: 7 CM/S
RIGHT ECA SYS: 44 CM/S
RIGHT ICA DIST DIAS: 25 CM/S
RIGHT ICA DIST SYS: 72 CM/S
RIGHT ICA MID DIAS: 24 CM/S
RIGHT ICA MID SYS: 79 CM/S
RIGHT ICA PROX DIAS: 16 CM/S
RIGHT ICA PROX SYS: 49 CM/S
RIGHT VENTRICULAR END-DIASTOLIC DIMENSION: 3.81 CM
RIGHT VENTRICULAR END-DIASTOLIC DIMENSION: 3.81 CM
RIGHT VERTEBRAL DIAS: 12 CM/S
RIGHT VERTEBRAL SYS: 43 CM/S
RV TISSUE DOPPLER FREE WALL SYSTOLIC VELOCITY 1 (APICAL 4 CHAMBER VIEW): 11.62 CM/S
SINUS: 2.89 CM
SINUS: 2.89 CM
STJ: 3.24 CM
STJ: 3.24 CM
TDI LATERAL: 0.06 M/S
TDI LATERAL: 0.06 M/S
TDI SEPTAL: 0.05 M/S
TDI SEPTAL: 0.05 M/S
TDI: 0.06 M/S
TDI: 0.06 M/S
TR MAX PG: 25 MMHG
TR MAX PG: 25 MMHG
TRICUSPID ANNULAR PLANE SYSTOLIC EXCURSION: 2 CM
TRICUSPID ANNULAR PLANE SYSTOLIC EXCURSION: 2 CM
TV REST PULMONARY ARTERY PRESSURE: 28 MMHG

## 2023-07-03 PROCEDURE — 93880 EXTRACRANIAL BILAT STUDY: CPT | Mod: 26,,, | Performed by: INTERNAL MEDICINE

## 2023-07-03 PROCEDURE — 93880 EXTRACRANIAL BILAT STUDY: CPT

## 2023-07-03 PROCEDURE — 93306 TTE W/DOPPLER COMPLETE: CPT | Mod: 26,,, | Performed by: INTERNAL MEDICINE

## 2023-07-03 PROCEDURE — 93306 ECHO (CUPID ONLY): ICD-10-PCS | Mod: 26,,, | Performed by: INTERNAL MEDICINE

## 2023-07-03 PROCEDURE — 93880 CV US DOPPLER CAROTID (CUPID ONLY): ICD-10-PCS | Mod: 26,,, | Performed by: INTERNAL MEDICINE

## 2023-07-03 PROCEDURE — 93306 TTE W/DOPPLER COMPLETE: CPT

## 2023-07-05 ENCOUNTER — PATIENT MESSAGE (OUTPATIENT)
Dept: ELECTROPHYSIOLOGY | Facility: CLINIC | Age: 78
End: 2023-07-05
Payer: MEDICARE

## 2023-07-05 DIAGNOSIS — I49.8 OTHER SPECIFIED CARDIAC ARRHYTHMIAS: Primary | ICD-10-CM

## 2023-07-05 RX ORDER — FLECAINIDE ACETATE 100 MG/1
TABLET ORAL
Qty: 180 TABLET | Refills: 0 | OUTPATIENT
Start: 2023-07-05

## 2023-07-05 RX ORDER — FLECAINIDE ACETATE 100 MG/1
100 TABLET ORAL EVERY 12 HOURS
Qty: 180 TABLET | Refills: 0 | Status: SHIPPED | OUTPATIENT
Start: 2023-07-05 | End: 2023-07-06 | Stop reason: SDUPTHER

## 2023-07-05 NOTE — PROGRESS NOTES
Ms. Silver is a patient of Dr. Perez and was last seen in clinic 6/3/2022.      Subjective:   Patient ID:  Rachel Silver is a 77 y.o. female who presents for follow up of Atrial Fibrillation  .     HPI:    Ms. Silver is a 77 y.o. female with HTN, HLD, pAF here for annual follow up.     Background:    Tachypalpitations on and off for years. Usually goes away by itself.  Had them several times per month. Lasts 5-10 mins.   Great exertion tolerance.  We started flecainide with a great response. Only 1 episode c/w AF, self-limited, for years.  echo 7/19 LVEF 40% (during rapid AF) -> 12/2019 60%  Holter 7/19 SR, PAF/AFL  PET neg  My interpretation of today's ECG is SB 57 bpm  Doing well on flec / dilt / xarelto. Continue.  Return in 1 year, or earlier prn.    Update (07/06/2023):    Today she says she has not felt any AF over the past year. Struggles with higher salt diet and knows her BPs are higher after high salt intake. No CP, LEVINE, palps, LH, syncope.   Line dancing and walks, but not consistently, brien in summer  Swims in the summer -does laps  Lives in grandchildren so she is very active    She is currently taking xarelto 20mg daily for stroke prophylaxis and denies significant bleeding episodes. She is currently being treated with flecainide 100mg BID for rhythm control and diltiazem 240mg daily for HR control. Kidney function is stable, with a creatinine of 0.8 on 2/10/2023.    I have personally reviewed the patient's EKG from 6/16/2023, which shows SB with 1st deg AVB at 55bpm. MA interval is 214. QRS is 104. QT is 454.    Relevant Cardiac Test Results:    2D Echo (7/3/2023):  The left ventricle is normal in size with normal systolic function.  The estimated ejection fraction is 63%.  Indeterminate left ventricular diastolic function.  Normal right ventricular size with normal right ventricular systolic function.  Moderate left atrial enlargement.  Mild right atrial enlargement.  Mild tricuspid  regurgitation.  Normal central venous pressure (3 mmHg).  The estimated PA systolic pressure is 28 mmHg.    Current Outpatient Medications   Medication Sig    diltiaZEM HCl (CARDIZEM LA) 240 mg 24 hr tablet Take 1 tablet by mouth once daily    estradioL (ESTRACE) 0.01 % (0.1 mg/gram) vaginal cream Place 1 g vaginally 3 (three) times a week. Place by fingertip application before bedtime three times a week (Monday, Wednesday, Friday) (Patient not taking: Reported on 6/16/2023)    flecainide (TAMBOCOR) 100 MG Tab Take 1 tablet (100 mg total) by mouth every 12 (twelve) hours.    hydroCHLOROthiazide (HYDRODIURIL) 25 MG tablet Take 1 tablet (25 mg total) by mouth once daily.    rivaroxaban (XARELTO) 20 mg Tab Take 1 tablet (20 mg total) by mouth once daily.    rosuvastatin (CRESTOR) 40 MG Tab TAKE 1 TABLET BY MOUTH ONCE DAILY IN THE EVENING     Current Facility-Administered Medications   Medication    doxycycline tablet 100 mg    sodium chloride 0.9% flush 10 mL     Review of Systems   Constitutional: Negative for malaise/fatigue.   Cardiovascular:  Negative for chest pain, dyspnea on exertion, irregular heartbeat, leg swelling and palpitations.   Respiratory:  Negative for shortness of breath.    Hematologic/Lymphatic: Negative for bleeding problem.   Skin:  Negative for rash.   Musculoskeletal:  Negative for myalgias.   Gastrointestinal:  Negative for hematemesis, hematochezia and nausea.   Genitourinary:  Negative for hematuria.   Neurological:  Negative for light-headedness.   Psychiatric/Behavioral:  Negative for altered mental status.    Allergic/Immunologic: Negative for persistent infections.     Objective:          BP (!) 141/68   Pulse (!) 55   Ht 5' (1.524 m)   Wt 74.4 kg (164 lb 0.4 oz)   BMI 32.03 kg/m²     Physical Exam  Vitals and nursing note reviewed.   Constitutional:       Appearance: Normal appearance. She is well-developed.   HENT:      Head: Normocephalic.      Nose: Nose normal.   Eyes:       Pupils: Pupils are equal, round, and reactive to light.   Cardiovascular:      Rate and Rhythm: Regular rhythm. Bradycardia present.   Pulmonary:      Effort: No respiratory distress.      Breath sounds: Normal breath sounds.   Musculoskeletal:         General: Normal range of motion.   Skin:     General: Skin is warm and dry.      Findings: No erythema.   Neurological:      Mental Status: She is alert and oriented to person, place, and time.   Psychiatric:         Speech: Speech normal.         Behavior: Behavior normal.     Lab Results   Component Value Date     02/10/2023    K 3.5 02/10/2023    BUN 13 02/10/2023    CREATININE 0.8 02/10/2023    ALT 35 09/06/2019    AST 33 09/06/2019    HGB 13.1 06/08/2020    HCT 37.7 06/08/2020    TSH 1.335 12/27/2019    LDLCALC 61 05/22/2023    LDLCALC 66.2 09/06/2019             Assessment:     1. PAF (paroxysmal atrial fibrillation)    2. Essential hypertension    3. On anticoagulant therapy    4. Encounter for monitoring flecainide therapy      Plan:     In summary, Ms. Silver is a 77 y.o. female with HTN, HLD, pAF here for annual follow up.   Stable from rhythm standpoint. Maintaining sinus rhythm on flecainide. ECG with stable intervals. Narrow QRS. Gold but no LH.  Echo shows preserved LV function. CHADSVASc 4 on xarelto. Follows with general cardiology.    Continue current regimen  RTC 1 yr, sooner if needed    *A copy of this note has been sent to Dr. Perez*    Follow up in about 1 year (around 7/6/2024).    ------------------------------------------------------------------    CHANTEL Alfaro, NP-C  Cardiac Electrophysiology

## 2023-07-06 ENCOUNTER — PATIENT MESSAGE (OUTPATIENT)
Dept: CARDIOLOGY | Facility: CLINIC | Age: 78
End: 2023-07-06
Payer: MEDICARE

## 2023-07-06 ENCOUNTER — OFFICE VISIT (OUTPATIENT)
Dept: ELECTROPHYSIOLOGY | Facility: CLINIC | Age: 78
End: 2023-07-06
Payer: MEDICARE

## 2023-07-06 VITALS
SYSTOLIC BLOOD PRESSURE: 141 MMHG | HEART RATE: 55 BPM | HEIGHT: 60 IN | WEIGHT: 164 LBS | DIASTOLIC BLOOD PRESSURE: 68 MMHG | BODY MASS INDEX: 32.2 KG/M2

## 2023-07-06 DIAGNOSIS — I48.0 PAF (PAROXYSMAL ATRIAL FIBRILLATION): Primary | ICD-10-CM

## 2023-07-06 DIAGNOSIS — I10 ESSENTIAL HYPERTENSION: ICD-10-CM

## 2023-07-06 DIAGNOSIS — Z79.01 ON ANTICOAGULANT THERAPY: ICD-10-CM

## 2023-07-06 DIAGNOSIS — Z79.899 ENCOUNTER FOR MONITORING FLECAINIDE THERAPY: ICD-10-CM

## 2023-07-06 DIAGNOSIS — Z51.81 ENCOUNTER FOR MONITORING FLECAINIDE THERAPY: ICD-10-CM

## 2023-07-06 PROCEDURE — 1101F PR PT FALLS ASSESS DOC 0-1 FALLS W/OUT INJ PAST YR: ICD-10-PCS | Mod: CPTII,S$GLB,, | Performed by: NURSE PRACTITIONER

## 2023-07-06 PROCEDURE — 1159F MED LIST DOCD IN RCRD: CPT | Mod: CPTII,S$GLB,, | Performed by: NURSE PRACTITIONER

## 2023-07-06 PROCEDURE — 1126F PR PAIN SEVERITY QUANTIFIED, NO PAIN PRESENT: ICD-10-PCS | Mod: CPTII,S$GLB,, | Performed by: NURSE PRACTITIONER

## 2023-07-06 PROCEDURE — 1126F AMNT PAIN NOTED NONE PRSNT: CPT | Mod: CPTII,S$GLB,, | Performed by: NURSE PRACTITIONER

## 2023-07-06 PROCEDURE — 3077F SYST BP >= 140 MM HG: CPT | Mod: CPTII,S$GLB,, | Performed by: NURSE PRACTITIONER

## 2023-07-06 PROCEDURE — 99214 PR OFFICE/OUTPT VISIT, EST, LEVL IV, 30-39 MIN: ICD-10-PCS | Mod: S$GLB,,, | Performed by: NURSE PRACTITIONER

## 2023-07-06 PROCEDURE — 3288F FALL RISK ASSESSMENT DOCD: CPT | Mod: CPTII,S$GLB,, | Performed by: NURSE PRACTITIONER

## 2023-07-06 PROCEDURE — 99999 PR PBB SHADOW E&M-EST. PATIENT-LVL III: ICD-10-PCS | Mod: PBBFAC,,, | Performed by: NURSE PRACTITIONER

## 2023-07-06 PROCEDURE — 3288F PR FALLS RISK ASSESSMENT DOCUMENTED: ICD-10-PCS | Mod: CPTII,S$GLB,, | Performed by: NURSE PRACTITIONER

## 2023-07-06 PROCEDURE — 1159F PR MEDICATION LIST DOCUMENTED IN MEDICAL RECORD: ICD-10-PCS | Mod: CPTII,S$GLB,, | Performed by: NURSE PRACTITIONER

## 2023-07-06 PROCEDURE — 1101F PT FALLS ASSESS-DOCD LE1/YR: CPT | Mod: CPTII,S$GLB,, | Performed by: NURSE PRACTITIONER

## 2023-07-06 PROCEDURE — 3078F DIAST BP <80 MM HG: CPT | Mod: CPTII,S$GLB,, | Performed by: NURSE PRACTITIONER

## 2023-07-06 PROCEDURE — 3077F PR MOST RECENT SYSTOLIC BLOOD PRESSURE >= 140 MM HG: ICD-10-PCS | Mod: CPTII,S$GLB,, | Performed by: NURSE PRACTITIONER

## 2023-07-06 PROCEDURE — 99999 PR PBB SHADOW E&M-EST. PATIENT-LVL III: CPT | Mod: PBBFAC,,, | Performed by: NURSE PRACTITIONER

## 2023-07-06 PROCEDURE — 1160F PR REVIEW ALL MEDS BY PRESCRIBER/CLIN PHARMACIST DOCUMENTED: ICD-10-PCS | Mod: CPTII,S$GLB,, | Performed by: NURSE PRACTITIONER

## 2023-07-06 PROCEDURE — 3078F PR MOST RECENT DIASTOLIC BLOOD PRESSURE < 80 MM HG: ICD-10-PCS | Mod: CPTII,S$GLB,, | Performed by: NURSE PRACTITIONER

## 2023-07-06 PROCEDURE — 1160F RVW MEDS BY RX/DR IN RCRD: CPT | Mod: CPTII,S$GLB,, | Performed by: NURSE PRACTITIONER

## 2023-07-06 PROCEDURE — 99214 OFFICE O/P EST MOD 30 MIN: CPT | Mod: S$GLB,,, | Performed by: NURSE PRACTITIONER

## 2023-07-06 RX ORDER — FLECAINIDE ACETATE 100 MG/1
100 TABLET ORAL EVERY 12 HOURS
Qty: 180 TABLET | Refills: 3 | Status: SHIPPED | OUTPATIENT
Start: 2023-07-06

## 2023-07-06 NOTE — PROGRESS NOTES
As Dr. Graff requested me to do is to inform Mrs. Silver about her test results. I tried to call but did not get a response. I then messaged her stating that her  echo (ultrasound of the heart) is normal. Some changes in the heart are related to high BP and atrial fibrillation but overall is normal. I am now waiting for her response.

## 2023-07-06 NOTE — PROGRESS NOTES
plz let pt. Know that echo (ultrasound of the heart) is normal. Some changes in the heart are related to high BP and atrial fibrillation but overall is normal

## 2023-07-07 ENCOUNTER — PATIENT MESSAGE (OUTPATIENT)
Dept: UROLOGY | Facility: CLINIC | Age: 78
End: 2023-07-07
Payer: MEDICARE

## 2023-07-07 ENCOUNTER — TELEPHONE (OUTPATIENT)
Dept: CARDIOLOGY | Facility: CLINIC | Age: 78
End: 2023-07-07
Payer: MEDICARE

## 2023-07-10 ENCOUNTER — PATIENT MESSAGE (OUTPATIENT)
Dept: CARDIOLOGY | Facility: CLINIC | Age: 78
End: 2023-07-10
Payer: MEDICARE

## 2023-08-14 RX ORDER — DILTIAZEM HYDROCHLORIDE EXTENDED-RELEASE TABLETS 240 MG/1
TABLET, EXTENDED RELEASE ORAL
Qty: 90 TABLET | Refills: 3 | Status: SHIPPED | OUTPATIENT
Start: 2023-08-14

## 2023-08-14 RX ORDER — ROSUVASTATIN CALCIUM 40 MG/1
TABLET, COATED ORAL
Qty: 90 TABLET | Refills: 3 | Status: SHIPPED | OUTPATIENT
Start: 2023-08-14

## 2023-08-31 ENCOUNTER — PATIENT MESSAGE (OUTPATIENT)
Dept: ELECTROPHYSIOLOGY | Facility: CLINIC | Age: 78
End: 2023-08-31
Payer: MEDICARE

## 2023-09-01 ENCOUNTER — TELEPHONE (OUTPATIENT)
Dept: PHARMACY | Facility: CLINIC | Age: 78
End: 2023-09-01
Payer: MEDICARE

## 2023-09-01 NOTE — TELEPHONE ENCOUNTER
Patient states she is able to afford Xarelto for 30 day supply at $85 a month. Currently awaiting patient registration into the [employer] program. Will follow up with patient in 5 business days for update and offer assistance.

## 2023-10-17 ENCOUNTER — TELEPHONE (OUTPATIENT)
Dept: SPORTS MEDICINE | Facility: CLINIC | Age: 78
End: 2023-10-17
Payer: MEDICARE

## 2023-10-17 DIAGNOSIS — G89.29 CHRONIC PAIN OF RIGHT KNEE: Primary | ICD-10-CM

## 2023-10-17 DIAGNOSIS — M25.561 CHRONIC PAIN OF RIGHT KNEE: Primary | ICD-10-CM

## 2023-10-17 NOTE — TELEPHONE ENCOUNTER
Spoke to the patient in regards to appointment with Amy Singh PA-C  on 10/26. While on the call I informed the patient that her provider will not be in clinic that day. Offered to see the patient sooner on 10/19 at 9:15 am for x-ray and 9:30 am for office visit with Amy Singh PA-C  at the [facility] location. The patient verbally accepted the appointment.

## 2023-10-19 ENCOUNTER — OFFICE VISIT (OUTPATIENT)
Dept: SPORTS MEDICINE | Facility: CLINIC | Age: 78
End: 2023-10-19
Payer: MEDICARE

## 2023-10-19 ENCOUNTER — HOSPITAL ENCOUNTER (OUTPATIENT)
Dept: RADIOLOGY | Facility: HOSPITAL | Age: 78
Discharge: HOME | End: 2023-10-19
Attending: PHYSICIAN ASSISTANT
Payer: MEDICARE

## 2023-10-19 VITALS
BODY MASS INDEX: 33.02 KG/M2 | SYSTOLIC BLOOD PRESSURE: 144 MMHG | HEIGHT: 60 IN | WEIGHT: 168.19 LBS | HEART RATE: 57 BPM | DIASTOLIC BLOOD PRESSURE: 75 MMHG

## 2023-10-19 DIAGNOSIS — M25.561 CHRONIC PAIN OF RIGHT KNEE: ICD-10-CM

## 2023-10-19 DIAGNOSIS — M17.12 PRIMARY OSTEOARTHRITIS OF LEFT KNEE: Primary | ICD-10-CM

## 2023-10-19 DIAGNOSIS — M25.562 ACUTE PAIN OF LEFT KNEE: ICD-10-CM

## 2023-10-19 DIAGNOSIS — G89.29 CHRONIC PAIN OF RIGHT KNEE: ICD-10-CM

## 2023-10-19 PROCEDURE — 1125F AMNT PAIN NOTED PAIN PRSNT: CPT | Mod: CPTII,S$GLB,, | Performed by: PHYSICIAN ASSISTANT

## 2023-10-19 PROCEDURE — 1159F MED LIST DOCD IN RCRD: CPT | Mod: CPTII,S$GLB,, | Performed by: PHYSICIAN ASSISTANT

## 2023-10-19 PROCEDURE — 1101F PT FALLS ASSESS-DOCD LE1/YR: CPT | Mod: CPTII,S$GLB,, | Performed by: PHYSICIAN ASSISTANT

## 2023-10-19 PROCEDURE — 1101F PR PT FALLS ASSESS DOC 0-1 FALLS W/OUT INJ PAST YR: ICD-10-PCS | Mod: CPTII,S$GLB,, | Performed by: PHYSICIAN ASSISTANT

## 2023-10-19 PROCEDURE — 1125F PR PAIN SEVERITY QUANTIFIED, PAIN PRESENT: ICD-10-PCS | Mod: CPTII,S$GLB,, | Performed by: PHYSICIAN ASSISTANT

## 2023-10-19 PROCEDURE — 1159F PR MEDICATION LIST DOCUMENTED IN MEDICAL RECORD: ICD-10-PCS | Mod: CPTII,S$GLB,, | Performed by: PHYSICIAN ASSISTANT

## 2023-10-19 PROCEDURE — 99213 OFFICE O/P EST LOW 20 MIN: CPT | Mod: S$GLB,,, | Performed by: PHYSICIAN ASSISTANT

## 2023-10-19 PROCEDURE — 3077F SYST BP >= 140 MM HG: CPT | Mod: CPTII,S$GLB,, | Performed by: PHYSICIAN ASSISTANT

## 2023-10-19 PROCEDURE — 73564 XR KNEE ORTHO BILAT WITH FLEXION: ICD-10-PCS | Mod: 26,50,, | Performed by: RADIOLOGY

## 2023-10-19 PROCEDURE — 73564 X-RAY EXAM KNEE 4 OR MORE: CPT | Mod: TC,50,PN

## 2023-10-19 PROCEDURE — 73564 X-RAY EXAM KNEE 4 OR MORE: CPT | Mod: 26,50,, | Performed by: RADIOLOGY

## 2023-10-19 PROCEDURE — 3288F FALL RISK ASSESSMENT DOCD: CPT | Mod: CPTII,S$GLB,, | Performed by: PHYSICIAN ASSISTANT

## 2023-10-19 PROCEDURE — 1160F RVW MEDS BY RX/DR IN RCRD: CPT | Mod: CPTII,S$GLB,, | Performed by: PHYSICIAN ASSISTANT

## 2023-10-19 PROCEDURE — 3077F PR MOST RECENT SYSTOLIC BLOOD PRESSURE >= 140 MM HG: ICD-10-PCS | Mod: CPTII,S$GLB,, | Performed by: PHYSICIAN ASSISTANT

## 2023-10-19 PROCEDURE — 3078F PR MOST RECENT DIASTOLIC BLOOD PRESSURE < 80 MM HG: ICD-10-PCS | Mod: CPTII,S$GLB,, | Performed by: PHYSICIAN ASSISTANT

## 2023-10-19 PROCEDURE — 3288F PR FALLS RISK ASSESSMENT DOCUMENTED: ICD-10-PCS | Mod: CPTII,S$GLB,, | Performed by: PHYSICIAN ASSISTANT

## 2023-10-19 PROCEDURE — 99999 PR PBB SHADOW E&M-EST. PATIENT-LVL III: CPT | Mod: PBBFAC,,, | Performed by: PHYSICIAN ASSISTANT

## 2023-10-19 PROCEDURE — 99213 PR OFFICE/OUTPT VISIT, EST, LEVL III, 20-29 MIN: ICD-10-PCS | Mod: S$GLB,,, | Performed by: PHYSICIAN ASSISTANT

## 2023-10-19 PROCEDURE — 1160F PR REVIEW ALL MEDS BY PRESCRIBER/CLIN PHARMACIST DOCUMENTED: ICD-10-PCS | Mod: CPTII,S$GLB,, | Performed by: PHYSICIAN ASSISTANT

## 2023-10-19 PROCEDURE — 99999 PR PBB SHADOW E&M-EST. PATIENT-LVL III: ICD-10-PCS | Mod: PBBFAC,,, | Performed by: PHYSICIAN ASSISTANT

## 2023-10-19 PROCEDURE — 3078F DIAST BP <80 MM HG: CPT | Mod: CPTII,S$GLB,, | Performed by: PHYSICIAN ASSISTANT

## 2023-10-19 NOTE — PROGRESS NOTES
Subjective:          Chief Complaint: Rachel Silver is a 77 y.o. female who had concerns including Pain of the Left Knee.    Patient presents to clinic with left knee pain x 6 weeks. She remembers pain beginning when crossing her left leg over her right to do a pedicure on herself. Pain is located along the medial aspect of her knee. Pain increases at night when turning in bed. Swelling increases with increased activity. Pain at rest is 0/10. Pain at its worst is 6/10. She has been using a topical cream over her knee as needed. She is here today to discuss treatment options. Denies any previous history of injections in her knee.      Previous history of right knee medial meniscus repair at [facility]           Review of Systems   Constitutional: Negative. Negative for chills, fever, weight gain and weight loss.   HENT:  Negative for congestion and sore throat.    Eyes:  Negative for blurred vision and double vision.   Cardiovascular:  Negative for chest pain, leg swelling and palpitations.   Respiratory:  Negative for cough and shortness of breath.    Hematologic/Lymphatic: Does not bruise/bleed easily.   Skin:  Negative for itching, poor wound healing and rash.   Musculoskeletal:  Positive for joint pain. Negative for back pain, joint swelling, muscle weakness, myalgias and stiffness.   Gastrointestinal:  Negative for abdominal pain, constipation, diarrhea, nausea and vomiting.   Genitourinary: Negative.  Negative for frequency and hematuria.   Neurological:  Negative for dizziness, headaches, numbness, paresthesias and sensory change.   Psychiatric/Behavioral:  Negative for altered mental status and depression. The patient is not nervous/anxious.    Allergic/Immunologic: Negative for hives.                   Objective:        General: Rachel is well-developed, well-nourished, appears stated age, in no acute distress, alert and oriented to time, place and person.     General    Vitals reviewed.  Constitutional:  She is oriented to person, place, and time. She appears well-developed and well-nourished. No distress.   HENT:   Head: Normocephalic and atraumatic.   Eyes: EOM are normal.   Cardiovascular:  Normal rate and regular rhythm.            Pulmonary/Chest: Effort normal. No respiratory distress.   Neurological: She is alert and oriented to person, place, and time. She has normal reflexes. No cranial nerve deficit. Coordination normal.   Psychiatric: She has a normal mood and affect. Her behavior is normal. Judgment and thought content normal.     General Musculoskeletal Exam   Gait: abnormal and antalgic       Right Knee Exam     Inspection   Erythema: absent  Scars: absent  Swelling: absent  Effusion: absent  Deformity: absent  Bruising: absent    Tenderness   The patient is experiencing no tenderness.     Crepitus   The patient has crepitus of the patella.    Range of Motion   Extension:  0 normal   Flexion:  130 normal     Tests   Meniscus   Amari:  Medial - negative Lateral - negative  Ligament Examination   Lachman: normal (-1 to 2mm)   PCL-Posterior Drawer: normal (0 to 2mm)     MCL - Valgus: normal (0 to 2mm)  LCL - Varus: normal  Pivot Shift: normal (Equal)  Reverse Pivot Shift: normal (Equal)  Posterolateral Corner: stable  Patella   Passive Patellar Tilt: neutral  Patellar Tracking: normal  Patellar Glide (quadrants): Lateral - 1   Medial - 2  Patellar Grind: negative    Other   Sensation: normal    Left Knee Exam     Inspection   Erythema: absent  Scars: absent  Swelling: absent  Effusion: absent  Deformity: absent  Bruising: absent    Tenderness   The patient tender to palpation of the medial joint line and pes anserinus.    Crepitus   The patient has crepitus of the patella.    Range of Motion   Extension:  0 normal   Flexion:  130 normal     Tests   Meniscus   Amari:  Medial - positive Lateral - negative  Stability   Lachman: normal (-1 to 2mm)   PCL-Posterior Drawer: normal (0 to 2mm)  MCL - Valgus:  normal (0 to 2mm)  LCL - Varus: normal (0 to 2mm)  Pivot Shift: normal (Equal)  Reverse Pivot Shift: normal (Equal)  Posterolateral Corner: stable  Patella   Passive Patellar Tilt: neutral  Patellar Tracking: normal  Patellar Glide (Quadrants): Lateral - 1 Medial - 2  Patellar Grind: negative    Other   Sensation: normal    Muscle Strength   Right Lower Extremity   Hip Abduction: 5/5   Quadriceps:  5/5   Hamstrin/5   Left Lower Extremity   Hip Abduction: 5/5   Quadriceps:  5/5   Hamstrin/5     Reflexes     Left Side  Achilles:  2+  Quadriceps:  2+    Right Side   Achilles:  2+  Quadriceps:  2+    Vascular Exam     Right Pulses  Dorsalis Pedis:      2+  Posterior Tibial:      2+        Left Pulses  Dorsalis Pedis:      2+  Posterior Tibial:      2+        RADIOGRAPHS: 10/19/23  Bilateral knees:  FINDINGS:        Assessment:       Encounter Diagnoses   Name Primary?    Acute pain of left knee     Primary osteoarthritis of left knee Yes          Plan:       1. I made the decision to obtain old records of the patient including previous notes and imaging. New imaging was ordered today of the extremity or extremities evaluated. I independently reviewed and interpreted the radiographs and/or MRIs today as well as prior imaging. Reviewed radiographs with patient in detail.    2. Ice/elevate/compression    3. Recommended voltaren gel over left knee    4. Discussed visco-supplementation-Synvisc one  vs CSI with patient in detail. Patient will think about it and let me know if she is interested in moving forward with this.     5. RTC prn.                      Patient questionnaires may have been collected.

## 2023-11-17 ENCOUNTER — OFFICE VISIT (OUTPATIENT)
Dept: URGENT CARE | Facility: CLINIC | Age: 78
End: 2023-11-17
Payer: MEDICARE

## 2023-11-17 ENCOUNTER — HOSPITAL ENCOUNTER (INPATIENT)
Facility: HOSPITAL | Age: 78
LOS: 1 days | Discharge: HOME | DRG: 310 | End: 2023-11-18
Attending: EMERGENCY MEDICINE | Admitting: EMERGENCY MEDICINE
Payer: MEDICARE

## 2023-11-17 VITALS
BODY MASS INDEX: 32.81 KG/M2 | DIASTOLIC BLOOD PRESSURE: 86 MMHG | RESPIRATION RATE: 19 BRPM | HEART RATE: 96 BPM | SYSTOLIC BLOOD PRESSURE: 151 MMHG | WEIGHT: 168 LBS | TEMPERATURE: 98 F | OXYGEN SATURATION: 98 %

## 2023-11-17 DIAGNOSIS — I48.92 ATRIAL FLUTTER: ICD-10-CM

## 2023-11-17 DIAGNOSIS — I49.9 IRREGULAR HEART RATE: Primary | ICD-10-CM

## 2023-11-17 DIAGNOSIS — Z79.01 CHRONIC ANTICOAGULATION: ICD-10-CM

## 2023-11-17 DIAGNOSIS — Z86.79 HISTORY OF ATRIAL FIBRILLATION: ICD-10-CM

## 2023-11-17 DIAGNOSIS — I10 ESSENTIAL HYPERTENSION: ICD-10-CM

## 2023-11-17 DIAGNOSIS — R00.0 TACHYCARDIA: ICD-10-CM

## 2023-11-17 DIAGNOSIS — I48.91 ATRIAL FIBRILLATION: ICD-10-CM

## 2023-11-17 DIAGNOSIS — I48.91 ATRIAL FIBRILLATION WITH RVR: Primary | ICD-10-CM

## 2023-11-17 DIAGNOSIS — I48.92 ATRIAL FLUTTER WITH RAPID VENTRICULAR RESPONSE: ICD-10-CM

## 2023-11-17 DIAGNOSIS — R07.9 CHEST PAIN: ICD-10-CM

## 2023-11-17 DIAGNOSIS — I48.92 ATRIAL FLUTTER BY ELECTROCARDIOGRAM: ICD-10-CM

## 2023-11-17 LAB
ALBUMIN SERPL BCP-MCNC: 4.6 G/DL (ref 3.5–5.2)
ALP SERPL-CCNC: 48 U/L (ref 55–135)
ALT SERPL W/O P-5'-P-CCNC: 11 U/L (ref 10–44)
AMPHET+METHAMPHET UR QL: NEGATIVE
ANION GAP SERPL CALC-SCNC: 12 MMOL/L (ref 8–16)
AST SERPL-CCNC: 16 U/L (ref 10–40)
BARBITURATES UR QL SCN>200 NG/ML: NEGATIVE
BASOPHILS # BLD AUTO: 0.08 K/UL (ref 0–0.2)
BASOPHILS NFR BLD: 1 % (ref 0–1.9)
BENZODIAZ UR QL SCN>200 NG/ML: NEGATIVE
BILIRUB SERPL-MCNC: 0.3 MG/DL (ref 0.1–1)
BUN SERPL-MCNC: 16 MG/DL (ref 8–23)
BZE UR QL SCN: NEGATIVE
CALCIUM SERPL-MCNC: 10.3 MG/DL (ref 8.7–10.5)
CANNABINOIDS UR QL SCN: NEGATIVE
CHLORIDE SERPL-SCNC: 105 MMOL/L (ref 95–110)
CO2 SERPL-SCNC: 28 MMOL/L (ref 23–29)
CREAT SERPL-MCNC: 0.9 MG/DL (ref 0.5–1.4)
CREAT UR-MCNC: 25 MG/DL (ref 15–325)
DIFFERENTIAL METHOD: NORMAL
EOSINOPHIL # BLD AUTO: 0.3 K/UL (ref 0–0.5)
EOSINOPHIL NFR BLD: 4 % (ref 0–8)
ERYTHROCYTE [DISTWIDTH] IN BLOOD BY AUTOMATED COUNT: 12.7 % (ref 11.5–14.5)
EST. GFR  (NO RACE VARIABLE): >60 ML/MIN/1.73 M^2
ETHANOL SERPL-MCNC: <10 MG/DL
GLUCOSE SERPL-MCNC: 89 MG/DL (ref 70–110)
HCT VFR BLD AUTO: 41.3 % (ref 37–48.5)
HGB BLD-MCNC: 14.7 G/DL (ref 12–16)
IMM GRANULOCYTES # BLD AUTO: 0.02 K/UL (ref 0–0.04)
IMM GRANULOCYTES NFR BLD AUTO: 0.2 % (ref 0–0.5)
LYMPHOCYTES # BLD AUTO: 2.4 K/UL (ref 1–4.8)
LYMPHOCYTES NFR BLD: 28.4 % (ref 18–48)
MAGNESIUM SERPL-MCNC: 2.1 MG/DL (ref 1.6–2.6)
MCH RBC QN AUTO: 30.4 PG (ref 27–31)
MCHC RBC AUTO-ENTMCNC: 35.6 G/DL (ref 32–36)
MCV RBC AUTO: 86 FL (ref 82–98)
METHADONE UR QL SCN>300 NG/ML: NEGATIVE
MONOCYTES # BLD AUTO: 0.7 K/UL (ref 0.3–1)
MONOCYTES NFR BLD: 8.9 % (ref 4–15)
NEUTROPHILS # BLD AUTO: 4.8 K/UL (ref 1.8–7.7)
NEUTROPHILS NFR BLD: 57.5 % (ref 38–73)
NRBC BLD-RTO: 0 /100 WBC
OPIATES UR QL SCN: NEGATIVE
PCP UR QL SCN>25 NG/ML: NEGATIVE
PLATELET # BLD AUTO: 267 K/UL (ref 150–450)
PMV BLD AUTO: 12.2 FL (ref 9.2–12.9)
POTASSIUM SERPL-SCNC: 3.5 MMOL/L (ref 3.5–5.1)
PROT SERPL-MCNC: 8 G/DL (ref 6–8.4)
RBC # BLD AUTO: 4.83 M/UL (ref 4–5.4)
SODIUM SERPL-SCNC: 145 MMOL/L (ref 136–145)
TOXICOLOGY INFORMATION: NORMAL
TROPONIN I SERPL DL<=0.01 NG/ML-MCNC: 0.01 NG/ML (ref 0–0.03)
TSH SERPL DL<=0.005 MIU/L-ACNC: 1.53 UIU/ML (ref 0.4–4)
WBC # BLD AUTO: 8.31 K/UL (ref 3.9–12.7)

## 2023-11-17 PROCEDURE — 93010 ELECTROCARDIOGRAM REPORT: CPT | Mod: S$GLB,,, | Performed by: INTERNAL MEDICINE

## 2023-11-17 PROCEDURE — 93005 ELECTROCARDIOGRAM TRACING: CPT

## 2023-11-17 PROCEDURE — 12000002 HC ACUTE/MED SURGE SEMI-PRIVATE ROOM

## 2023-11-17 PROCEDURE — 96376 TX/PRO/DX INJ SAME DRUG ADON: CPT

## 2023-11-17 PROCEDURE — 96365 THER/PROPH/DIAG IV INF INIT: CPT

## 2023-11-17 PROCEDURE — 96361 HYDRATE IV INFUSION ADD-ON: CPT

## 2023-11-17 PROCEDURE — 93005 EKG 12-LEAD: ICD-10-PCS | Mod: S$GLB,,, | Performed by: PHYSICIAN ASSISTANT

## 2023-11-17 PROCEDURE — 84484 ASSAY OF TROPONIN QUANT: CPT | Performed by: EMERGENCY MEDICINE

## 2023-11-17 PROCEDURE — 63600175 PHARM REV CODE 636 W HCPCS: Performed by: EMERGENCY MEDICINE

## 2023-11-17 PROCEDURE — 99215 OFFICE O/P EST HI 40 MIN: CPT | Mod: S$GLB,,, | Performed by: PHYSICIAN ASSISTANT

## 2023-11-17 PROCEDURE — 99215 PR OFFICE/OUTPT VISIT, EST, LEVL V, 40-54 MIN: ICD-10-PCS | Mod: S$GLB,,, | Performed by: PHYSICIAN ASSISTANT

## 2023-11-17 PROCEDURE — 93010 EKG 12-LEAD: ICD-10-PCS | Mod: S$GLB,,, | Performed by: INTERNAL MEDICINE

## 2023-11-17 PROCEDURE — 80307 DRUG TEST PRSMV CHEM ANLYZR: CPT | Performed by: EMERGENCY MEDICINE

## 2023-11-17 PROCEDURE — 99285 EMERGENCY DEPT VISIT HI MDM: CPT | Mod: 25

## 2023-11-17 PROCEDURE — 93005 ELECTROCARDIOGRAM TRACING: CPT | Mod: S$GLB,,, | Performed by: PHYSICIAN ASSISTANT

## 2023-11-17 PROCEDURE — 83735 ASSAY OF MAGNESIUM: CPT | Performed by: EMERGENCY MEDICINE

## 2023-11-17 PROCEDURE — 96375 TX/PRO/DX INJ NEW DRUG ADDON: CPT

## 2023-11-17 PROCEDURE — 25000003 PHARM REV CODE 250: Performed by: EMERGENCY MEDICINE

## 2023-11-17 PROCEDURE — 80053 COMPREHEN METABOLIC PANEL: CPT | Performed by: EMERGENCY MEDICINE

## 2023-11-17 PROCEDURE — 85025 COMPLETE CBC W/AUTO DIFF WBC: CPT | Performed by: EMERGENCY MEDICINE

## 2023-11-17 PROCEDURE — 93010 EKG 12-LEAD: ICD-10-PCS | Mod: ,,, | Performed by: INTERNAL MEDICINE

## 2023-11-17 PROCEDURE — 82077 ASSAY SPEC XCP UR&BREATH IA: CPT | Performed by: EMERGENCY MEDICINE

## 2023-11-17 PROCEDURE — 93010 ELECTROCARDIOGRAM REPORT: CPT | Mod: ,,, | Performed by: INTERNAL MEDICINE

## 2023-11-17 PROCEDURE — 84443 ASSAY THYROID STIM HORMONE: CPT | Performed by: EMERGENCY MEDICINE

## 2023-11-17 RX ORDER — IBUPROFEN 200 MG
16 TABLET ORAL
Status: DISCONTINUED | OUTPATIENT
Start: 2023-11-18 | End: 2023-11-18 | Stop reason: HOSPADM

## 2023-11-17 RX ORDER — FLECAINIDE ACETATE 100 MG/1
100 TABLET ORAL
Status: COMPLETED | OUTPATIENT
Start: 2023-11-17 | End: 2023-11-17

## 2023-11-17 RX ORDER — SODIUM CHLORIDE 0.9 % (FLUSH) 0.9 %
10 SYRINGE (ML) INJECTION EVERY 12 HOURS PRN
Status: DISCONTINUED | OUTPATIENT
Start: 2023-11-18 | End: 2023-11-18 | Stop reason: HOSPADM

## 2023-11-17 RX ORDER — ATORVASTATIN CALCIUM 20 MG/1
80 TABLET, FILM COATED ORAL NIGHTLY
Status: DISCONTINUED | OUTPATIENT
Start: 2023-11-18 | End: 2023-11-18 | Stop reason: HOSPADM

## 2023-11-17 RX ORDER — GLUCAGON 1 MG
1 KIT INJECTION
Status: DISCONTINUED | OUTPATIENT
Start: 2023-11-18 | End: 2023-11-18 | Stop reason: HOSPADM

## 2023-11-17 RX ORDER — DILTIAZEM HCL/D5W 125 MG/125
5 PLASTIC BAG, INJECTION (ML) INTRAVENOUS CONTINUOUS
Status: DISCONTINUED | OUTPATIENT
Start: 2023-11-17 | End: 2023-11-18

## 2023-11-17 RX ORDER — HYDROCHLOROTHIAZIDE 12.5 MG/1
25 TABLET ORAL DAILY
Status: DISCONTINUED | OUTPATIENT
Start: 2023-11-18 | End: 2023-11-18 | Stop reason: HOSPADM

## 2023-11-17 RX ORDER — DILTIAZEM HYDROCHLORIDE 5 MG/ML
0.25 INJECTION INTRAVENOUS
Status: COMPLETED | OUTPATIENT
Start: 2023-11-17 | End: 2023-11-17

## 2023-11-17 RX ORDER — DILTIAZEM HYDROCHLORIDE 5 MG/ML
0.35 INJECTION INTRAVENOUS
Status: COMPLETED | OUTPATIENT
Start: 2023-11-17 | End: 2023-11-17

## 2023-11-17 RX ORDER — NALOXONE HCL 0.4 MG/ML
0.02 VIAL (ML) INJECTION
Status: DISCONTINUED | OUTPATIENT
Start: 2023-11-18 | End: 2023-11-18 | Stop reason: HOSPADM

## 2023-11-17 RX ORDER — IBUPROFEN 200 MG
24 TABLET ORAL
Status: DISCONTINUED | OUTPATIENT
Start: 2023-11-18 | End: 2023-11-18 | Stop reason: HOSPADM

## 2023-11-17 RX ORDER — LORAZEPAM 0.5 MG/1
0.5 TABLET ORAL
Status: COMPLETED | OUTPATIENT
Start: 2023-11-17 | End: 2023-11-17

## 2023-11-17 RX ORDER — MAGNESIUM SULFATE HEPTAHYDRATE 40 MG/ML
2 INJECTION, SOLUTION INTRAVENOUS
Status: COMPLETED | OUTPATIENT
Start: 2023-11-17 | End: 2023-11-17

## 2023-11-17 RX ADMIN — DILTIAZEM HYDROCHLORIDE 25.5 MG: 5 INJECTION INTRAVENOUS at 09:11

## 2023-11-17 RX ADMIN — LORAZEPAM 0.5 MG: 0.5 TABLET ORAL at 07:11

## 2023-11-17 RX ADMIN — FLECAINIDE ACETATE 100 MG: 100 TABLET ORAL at 07:11

## 2023-11-17 RX ADMIN — FLECAINIDE ACETATE 100 MG: 100 TABLET ORAL at 09:11

## 2023-11-17 RX ADMIN — MAGNESIUM SULFATE HEPTAHYDRATE 2 G: 40 INJECTION, SOLUTION INTRAVENOUS at 09:11

## 2023-11-17 RX ADMIN — DILTIAZEM HYDROCHLORIDE 10 MG/HR: 5 INJECTION INTRAVENOUS at 10:11

## 2023-11-17 RX ADMIN — DILTIAZEM HYDROCHLORIDE 18 MG: 5 INJECTION INTRAVENOUS at 07:11

## 2023-11-17 RX ADMIN — SODIUM CHLORIDE 1000 ML: 9 INJECTION, SOLUTION INTRAVENOUS at 07:11

## 2023-11-17 NOTE — PROGRESS NOTES
Subjective:      Patient ID: Rachel Silver is a 78 y.o. female.    Vitals:  weight is 76.2 kg (168 lb). Her oral temperature is 97.6 °F (36.4 °C). Her blood pressure is 151/86 (abnormal) and her pulse is 96. Her respiration is 19 and oxygen saturation is 98%.     Chief Complaint: Palpitations and Irregular Heart Beat (Hx of A-FIB  )    78-year-old female with a history of atrial fibrillation, hypertension, on chronic Xarelto, and on chronic flecainide who presents to urgent care clinic with her daughter for evaluation.  Patient states that while cleaning and bending over today, she developed palpitations.  This started around 2:00 p.m. and went away spontaneously but returned again.  It has been intermittent.  Took her flecainide and Xarelto this morning.  When new palpitations started, she also took baby aspirin.  No other associated symptoms.  This feels like previous AFib.  Presenting to urgent care for evaluation.    Palpitations   This is a new problem. The current episode started today. The problem occurs constantly. The problem has been waxing and waning. On average, each episode lasts 13 seconds. Nothing aggravates the symptoms. Associated symptoms include an irregular heartbeat. Pertinent negatives include no anxiety, chest fullness, chest pain, coughing, diaphoresis, dizziness, fever, malaise/fatigue, nausea, near-syncope, numbness, shortness of breath, syncope, vomiting or weakness. She has tried nothing for the symptoms.       Constitution: Negative for activity change, appetite change, chills, sweating, fatigue, fever and generalized weakness.   HENT:  Negative for ear pain, hearing loss, facial swelling, congestion, postnasal drip, sinus pain, sinus pressure, sore throat, trouble swallowing and voice change.    Neck: Negative for neck pain, neck stiffness and painful lymph nodes.   Cardiovascular:  Positive for palpitations. Negative for chest pain, leg swelling, sob on exertion and passing out.    Eyes:  Negative for eye discharge, eye pain, photophobia, vision loss, double vision and blurred vision.   Respiratory:  Negative for chest tightness, cough, sputum production, bloody sputum, COPD, shortness of breath, stridor, wheezing and asthma.    Gastrointestinal:  Negative for abdominal pain, nausea, vomiting, constipation, diarrhea, bright red blood in stool, rectal bleeding, heartburn and bowel incontinence.   Genitourinary:  Negative for dysuria, frequency, urgency, urine decreased, flank pain, bladder incontinence and hematuria.   Musculoskeletal:  Negative for trauma, joint pain, joint swelling, abnormal ROM of joint, muscle cramps and muscle ache.   Skin:  Negative for color change, pale, rash and wound.   Allergic/Immunologic: Negative for seasonal allergies, asthma and immunocompromised state.   Neurological:  Negative for dizziness, history of vertigo, light-headedness, passing out, facial drooping, speech difficulty, coordination disturbances, loss of balance, headaches, disorientation, altered mental status, loss of consciousness, numbness, tingling and seizures.   Hematologic/Lymphatic: Negative for swollen lymph nodes, easy bruising/bleeding and trouble clotting. Does not bruise/bleed easily.   Psychiatric/Behavioral:  Negative for altered mental status, disorientation and nervous/anxious. The patient is not nervous/anxious.       Past Medical History:   Diagnosis Date    Atrial fibrillation     Cataracts, bilateral     Hoarse     Hypertension     UTI (urinary tract infection)     Vaginal delivery     x3       Objective:     Physical Exam   Constitutional: She is oriented to person, place, and time. She appears well-developed. She is cooperative. She does not appear ill. No distress.   HENT:   Head: Normocephalic.   Ears:   Right Ear: Hearing, external ear and ear canal normal. No no drainage, swelling or tenderness. No mastoid tenderness.   Left Ear: Hearing, external ear and ear canal normal.  No no drainage, swelling or tenderness. No mastoid tenderness.   Nose: Nose normal. No rhinorrhea or congestion. Right sinus exhibits no maxillary sinus tenderness and no frontal sinus tenderness. Left sinus exhibits no maxillary sinus tenderness and no frontal sinus tenderness.   Mouth/Throat: Uvula is midline, oropharynx is clear and moist and mucous membranes are normal. Mucous membranes are moist. No oral lesions. No trismus in the jaw. No uvula swelling. No oropharyngeal exudate, posterior oropharyngeal edema, posterior oropharyngeal erythema or tonsillar abscesses. No tonsillar exudate. Oropharynx is clear.   Eyes: Conjunctivae, EOM and lids are normal. Right eye exhibits no discharge. Left eye exhibits no discharge. Right conjunctiva is not injected. Right conjunctiva has no hemorrhage. Left conjunctiva is not injected. Left conjunctiva has no hemorrhage. Extraocular movement intact vision grossly intact gaze aligned appropriately   Neck: Phonation normal. Neck supple. No neck rigidity present.   Cardiovascular: Normal heart sounds and normal pulses. An irregular rhythm present. Tachycardia present.   No murmur heard.     Comments: No leg edema, calf tenderness/erythema, or Homans sign bilaterally.       Pulmonary/Chest: Effort normal and breath sounds normal. No accessory muscle usage. No respiratory distress. She has no wheezes. She exhibits no tenderness.   Abdominal: Normal appearance. She exhibits no distension. Soft. There is no abdominal tenderness. There is no rebound and no guarding.   Musculoskeletal: Normal range of motion.         General: Normal range of motion.      Comments: Moves all extremities with normal tone, strength, and ROM.  Gait normal.   Lymphadenopathy:     She has no cervical adenopathy.        Right cervical: No superficial cervical adenopathy present.       Left cervical: No superficial cervical adenopathy present.   Neurological: no focal deficit. She is alert, oriented to  person, place, and time and at baseline. She has normal motor skills and normal sensation. She displays facial symmetry and no dysarthria. She exhibits normal muscle tone. Gait and coordination normal. Coordination normal. GCS eye subscore is 4. GCS verbal subscore is 5. GCS motor subscore is 6.   Skin: Skin is warm, dry and no rash. Capillary refill takes less than 2 seconds.   Psychiatric: She experiences Normal attention. Her speech is normal and behavior is normal. Thought content normal.   Nursing note and vitals reviewed.    EKG 11/17/2023  This was reviewed with previous EKG on 06/16/2023  Atrial flutter with variable AV block.  Incomplete right bundle-branch block.    Vent. Rate      117   bpm  OK interval             ms    QRS duration         96 ms  QT/QTc                 280/390 ms  P-R-T Axes            265   71   212    Results for orders placed or performed during the hospital encounter of 06/16/23   SCHEDULED EKG 12-LEAD (to [facility])    Collection Time: 06/16/23  9:14 AM    Narrative    Test Reason : I10,    Vent. Rate : 055 BPM     Atrial Rate : 055 BPM     P-R Int : 214 ms          QRS Dur : 104 ms      QT Int : 454 ms       P-R-T Axes : 055 050 070 degrees     QTc Int : 434 ms    Sinus bradycardia with 1st degree A-V block  Incomplete right bundle branch block  ST and T wave abnormality, consider anterior ischemia  Abnormal ECG  When compared with ECG of 03-JUN-2022 10:29,  No significant change was found  Confirmed by Kallie Preciado MD ([id number]) on 6/16/2023 11:55:09 AM    Referred By: ALESHIA BHARDWAJ           Confirmed By:Kallie Preciado MD       Assessment:     1. Irregular heart rate    2. Atrial flutter by electrocardiogram    3. History of atrial fibrillation    4. Chronic anticoagulation      Patient presents with clinical exam findings and history concerning for atrial fibrillation exacerbation.  On exam, patient is nontoxic.  Vitals stable tachycardia ranging from 109 to 120s on EKG.    Clinic  testing showed EKG change compared to previous 06/16/2023 with atrial flutter, variable AV block, incomplete right bundle branch block.  Diagnostic testing results were independently reviewed and interpreted, which were discussed in depth with patient.    Given exam findings and history, recommend ED referral for high level evaluation and care.  Due to the high risk of complications the patient is being sent to the emergency room for further evaluation, treatment and possible hospitalization. This was discussed with patient/daughter and ED team who both agreed with my plan of care.  We discussed EMS to transfer her but patient daughter declined since she has been dealing with this for the last few hours and feels like her previous AFib flare-up.    Patient verbalized understanding and agreed with the entirety of plan of care.    Note dictated with voice recognition software, please excuse any grammatical errors.        Plan:       Irregular heart rate  -     IN OFFICE EKG 12-LEAD (to [place])  -     Refer to Emergency Dept.    Atrial flutter by electrocardiogram  -     Refer to Emergency Dept.    History of atrial fibrillation    Chronic anticoagulation             Additional MDM:     Heart Failure Score:   COPD = No

## 2023-11-17 NOTE — Clinical Note
Diagnosis: Atrial fibrillation with RVR [[id number]]   Future Attending Provider: BENJAMIN CASON [[id number]]   Admitting Provider:: PRASAD CHANCE [[id number]]   Reason for IP Medical Treatment  (Clinical interventions that can only be accomplished in the IP setting? ) :: Atrial fibrillation vs atrial flutter with RVR requiring IV antiarrhythmics   I certify that Inpatient services for greater than or equal to 2 midnights are medically necessary:: Yes   Plans for Post-Acute care--if anticipated (pick the single best option):: A. No post acute care anticipated at this time

## 2023-11-18 VITALS
DIASTOLIC BLOOD PRESSURE: 58 MMHG | WEIGHT: 163.81 LBS | RESPIRATION RATE: 17 BRPM | TEMPERATURE: 98 F | HEART RATE: 64 BPM | OXYGEN SATURATION: 97 % | HEIGHT: 65 IN | SYSTOLIC BLOOD PRESSURE: 118 MMHG | BODY MASS INDEX: 27.29 KG/M2

## 2023-11-18 LAB
ALBUMIN SERPL BCP-MCNC: 3.7 G/DL (ref 3.5–5.2)
ALP SERPL-CCNC: 36 U/L (ref 55–135)
ALT SERPL W/O P-5'-P-CCNC: 8 U/L (ref 10–44)
ANION GAP SERPL CALC-SCNC: 6 MMOL/L (ref 8–16)
AST SERPL-CCNC: 13 U/L (ref 10–40)
BASOPHILS # BLD AUTO: 0.09 K/UL (ref 0–0.2)
BASOPHILS NFR BLD: 1.2 % (ref 0–1.9)
BILIRUB SERPL-MCNC: 0.4 MG/DL (ref 0.1–1)
BUN SERPL-MCNC: 17 MG/DL (ref 8–23)
CALCIUM SERPL-MCNC: 8.9 MG/DL (ref 8.7–10.5)
CHLORIDE SERPL-SCNC: 107 MMOL/L (ref 95–110)
CO2 SERPL-SCNC: 30 MMOL/L (ref 23–29)
CREAT SERPL-MCNC: 0.9 MG/DL (ref 0.5–1.4)
DIFFERENTIAL METHOD: ABNORMAL
EOSINOPHIL # BLD AUTO: 0.3 K/UL (ref 0–0.5)
EOSINOPHIL NFR BLD: 3.8 % (ref 0–8)
ERYTHROCYTE [DISTWIDTH] IN BLOOD BY AUTOMATED COUNT: 12.8 % (ref 11.5–14.5)
EST. GFR  (NO RACE VARIABLE): >60 ML/MIN/1.73 M^2
GLUCOSE SERPL-MCNC: 112 MG/DL (ref 70–110)
HCT VFR BLD AUTO: 34.9 % (ref 37–48.5)
HGB BLD-MCNC: 12.5 G/DL (ref 12–16)
IMM GRANULOCYTES # BLD AUTO: 0.02 K/UL (ref 0–0.04)
IMM GRANULOCYTES NFR BLD AUTO: 0.3 % (ref 0–0.5)
LYMPHOCYTES # BLD AUTO: 2.1 K/UL (ref 1–4.8)
LYMPHOCYTES NFR BLD: 26.5 % (ref 18–48)
MAGNESIUM SERPL-MCNC: 2.3 MG/DL (ref 1.6–2.6)
MCH RBC QN AUTO: 30.5 PG (ref 27–31)
MCHC RBC AUTO-ENTMCNC: 35.8 G/DL (ref 32–36)
MCV RBC AUTO: 85 FL (ref 82–98)
MONOCYTES # BLD AUTO: 0.7 K/UL (ref 0.3–1)
MONOCYTES NFR BLD: 9.1 % (ref 4–15)
NEUTROPHILS # BLD AUTO: 4.6 K/UL (ref 1.8–7.7)
NEUTROPHILS NFR BLD: 59.1 % (ref 38–73)
NRBC BLD-RTO: 0 /100 WBC
PHOSPHATE SERPL-MCNC: 3.5 MG/DL (ref 2.7–4.5)
PLATELET # BLD AUTO: 238 K/UL (ref 150–450)
PMV BLD AUTO: 11.7 FL (ref 9.2–12.9)
POTASSIUM SERPL-SCNC: 3.7 MMOL/L (ref 3.5–5.1)
PROT SERPL-MCNC: 6.2 G/DL (ref 6–8.4)
RBC # BLD AUTO: 4.1 M/UL (ref 4–5.4)
SODIUM SERPL-SCNC: 143 MMOL/L (ref 136–145)
TROPONIN I SERPL DL<=0.01 NG/ML-MCNC: 0.01 NG/ML (ref 0–0.03)
WBC # BLD AUTO: 7.73 K/UL (ref 3.9–12.7)

## 2023-11-18 PROCEDURE — 93010 EKG 12-LEAD: ICD-10-PCS | Mod: ,,, | Performed by: INTERNAL MEDICINE

## 2023-11-18 PROCEDURE — 80053 COMPREHEN METABOLIC PANEL: CPT | Performed by: STUDENT IN AN ORGANIZED HEALTH CARE EDUCATION/TRAINING PROGRAM

## 2023-11-18 PROCEDURE — 36415 COLL VENOUS BLD VENIPUNCTURE: CPT | Performed by: STUDENT IN AN ORGANIZED HEALTH CARE EDUCATION/TRAINING PROGRAM

## 2023-11-18 PROCEDURE — 93010 ELECTROCARDIOGRAM REPORT: CPT | Mod: ,,, | Performed by: INTERNAL MEDICINE

## 2023-11-18 PROCEDURE — 84100 ASSAY OF PHOSPHORUS: CPT | Performed by: STUDENT IN AN ORGANIZED HEALTH CARE EDUCATION/TRAINING PROGRAM

## 2023-11-18 PROCEDURE — 36415 COLL VENOUS BLD VENIPUNCTURE: CPT | Performed by: EMERGENCY MEDICINE

## 2023-11-18 PROCEDURE — 93005 ELECTROCARDIOGRAM TRACING: CPT

## 2023-11-18 PROCEDURE — 25000003 PHARM REV CODE 250: Performed by: STUDENT IN AN ORGANIZED HEALTH CARE EDUCATION/TRAINING PROGRAM

## 2023-11-18 PROCEDURE — 85025 COMPLETE CBC W/AUTO DIFF WBC: CPT | Performed by: STUDENT IN AN ORGANIZED HEALTH CARE EDUCATION/TRAINING PROGRAM

## 2023-11-18 PROCEDURE — 84484 ASSAY OF TROPONIN QUANT: CPT | Performed by: EMERGENCY MEDICINE

## 2023-11-18 PROCEDURE — 83735 ASSAY OF MAGNESIUM: CPT | Performed by: STUDENT IN AN ORGANIZED HEALTH CARE EDUCATION/TRAINING PROGRAM

## 2023-11-18 RX ORDER — DILTIAZEM HYDROCHLORIDE 120 MG/1
240 CAPSULE, COATED, EXTENDED RELEASE ORAL DAILY
Status: DISCONTINUED | OUTPATIENT
Start: 2023-11-18 | End: 2023-11-18 | Stop reason: HOSPADM

## 2023-11-18 RX ORDER — POTASSIUM CHLORIDE 750 MG/1
30 CAPSULE, EXTENDED RELEASE ORAL ONCE
Status: COMPLETED | OUTPATIENT
Start: 2023-11-18 | End: 2023-11-18

## 2023-11-18 RX ADMIN — POTASSIUM CHLORIDE 30 MEQ: 10 CAPSULE, COATED, EXTENDED RELEASE ORAL at 08:11

## 2023-11-18 RX ADMIN — HYDROCHLOROTHIAZIDE 25 MG: 12.5 TABLET ORAL at 08:11

## 2023-11-18 RX ADMIN — DILTIAZEM HYDROCHLORIDE 240 MG: 120 CAPSULE, COATED, EXTENDED RELEASE ORAL at 09:11

## 2023-11-18 NOTE — PLAN OF CARE
Problem: Adult Inpatient Plan of Care  Goal: Plan of Care Review  11/18/2023 0429 by Cecilia Davila RN  Outcome: Ongoing, Progressing  11/18/2023 0428 by Cecilia Davila RN  Outcome: Ongoing, Progressing  Goal: Patient-Specific Goal (Individualized)  11/18/2023 0429 by Cecilia Davila RN  Outcome: Ongoing, Progressing  11/18/2023 0428 by Cecilia Davila RN  Outcome: Ongoing, Progressing  Goal: Absence of Hospital-Acquired Illness or Injury  11/18/2023 0429 by Cecilia Davila RN  Outcome: Ongoing, Progressing  11/18/2023 0428 by Cecilia Davila RN  Outcome: Ongoing, Progressing  Goal: Optimal Comfort and Wellbeing  11/18/2023 0429 by Cecilia Davila RN  Outcome: Ongoing, Progressing  11/18/2023 0428 by Cecilia Davila RN  Outcome: Ongoing, Progressing  Goal: Readiness for Transition of Care  11/18/2023 0429 by Cecilia Davila RN  Outcome: Ongoing, Progressing  11/18/2023 0428 by Cecilia Davila RN  Outcome: Ongoing, Progressing     Problem: Dysrhythmia  Goal: Normalized Cardiac Rhythm  Outcome: Ongoing, Progressing     Problem: Fall Injury Risk  Goal: Absence of Fall and Fall-Related Injury  Outcome: Ongoing, Progressing     Problem: Pain Acute  Goal: Acceptable Pain Control and Functional Ability  Outcome: Ongoing, Progressing

## 2023-11-18 NOTE — HPI
Ms. Silver is a 77 y.o. female with HTN, HLD, pAF (follows in EP clinic; on flecanide, dilt, xarelto) who presents for palpitations. She was in her usual state of health until this afternoon around 2pm while cleaning in her home, she bent down and suddenly felt palpitations. She reports palpitations hasn't occurred since January when it only lasted for 20 mins. She denies LH, dyspnea, chest pain, PND, orthopnea. She takes 240mg Dilt, flecainide 100mg BID and is compliant with her meds. She also takes xarelto 20mg in the mornings without a meal and has missed a couple of doses. Denies   She follows with EP and general cardiology. She reports recent stressors and drinks wine only socially (had 2  glasses of wine while at a show yesterday).     Upon arrival to the ED, she was hemodynamically stable. HR in 120s. ECG with suspected atypical atrial flutter. She received 1L of IVF and 2 doses of her flecainide. Cardiology was contacted and it was suggested that flecainide be held until EP is consulted. She was started on a diltiazem drip. She is admitted to Hospital Medicine for further management.

## 2023-11-18 NOTE — PLAN OF CARE
Patient is ready for discharge. Patient stable alert and oriented. IVs removed. No complaints of pain. Discussed discharge plan. Reviewed medications and side effects, appointments, and answered questions with patient and family. No new medications or changes to home meds.       Problem: Adult Inpatient Plan of Care  Goal: Plan of Care Review  Outcome: Met  Goal: Patient-Specific Goal (Individualized)  Outcome: Met  Goal: Absence of Hospital-Acquired Illness or Injury  Outcome: Met  Goal: Optimal Comfort and Wellbeing  Outcome: Met  Goal: Readiness for Transition of Care  Outcome: Met     Problem: Dysrhythmia  Goal: Normalized Cardiac Rhythm  Outcome: Met     Problem: Fall Injury Risk  Goal: Absence of Fall and Fall-Related Injury  Outcome: Met     Problem: Pain Acute  Goal: Acceptable Pain Control and Functional Ability  Outcome: Met

## 2023-11-18 NOTE — ED TRIAGE NOTES
Patient presents to the ED from urgent care for afib. Pt. Hr is 120's in triage. Pt. Is not complaining of any SOB or chest pain. Pt. Takes xarelto for afib and did take diltiazem.

## 2023-11-18 NOTE — ED PROVIDER NOTES
Encounter Date: 11/17/2023       History     Chief Complaint   Patient presents with    Atrial Fibrillation     Sent from [facility]  Rachel is a 78-year-old female with past medical history of atrial fibrillation on flecainide for rhythm control and also on diltiazem.  She presents due to acute onset of palpitations around 2:00 p.m. while bending over.  She denies chest pain, shortness of breath, dizziness or any other symptoms.  However her palpitations have not resolved.  She states that it has been a long time since the last time she went into atrial fibrillation.  She did drink several cups of coffee today.  Review of patient's allergies indicates:  No Known Allergies  Past Medical History:   Diagnosis Date    Atrial fibrillation     Cataracts, bilateral     Hoarse     Hypertension     UTI (urinary tract infection)     Vaginal delivery     x3     Past Surgical History:   Procedure Laterality Date    CYSTOSCOPY      HYSTERECTOMY      LARYNGOSCOPY N/A 06/11/2020    Procedure: LARYNGOSCOPY;  Surgeon: Yesenia Durham MD;  Location: [facility] OR;  Service: ENT;  Laterality: N/A;  RN PRE OP, COVID NEGATIVE-- 6-8-2020 [place]  HAS-Cardiac Clearance today 6-8 [facility] [place], [place]    MICROLARYNGOSCOPY N/A 07/09/2020    Procedure: MICROLARYNGOSCOPY;  Surgeon: Yesenia Durham MD;  Location: [facility] OR;  Service: ENT;  Laterality: N/A;    Microlaryngoscopy with biopsy vocal cords      TONSILLECTOMY      torn meniscus      VOCAL FOLD LESION EXCISION  07/09/2020    Procedure: EXCISION, LESION, VOCAL CORD, USING LASER;  Surgeon: Yesenia Durham MD;  Location: [facility] OR;  Service: ENT;;     Family History   Problem Relation Age of Onset    Heart failure Mother     Stroke Father     Stroke Brother     Heart disease Maternal Aunt     Heart disease Maternal Uncle     Heart disease Maternal Aunt     Heart disease Maternal Aunt     Heart disease Maternal Uncle      Social History     Tobacco Use    Smoking status: Former      Current packs/day: 0.00     Types: Cigarettes     Quit date: 10/9/1987     Years since quittin.1    Smokeless tobacco: Never   Substance Use Topics    Alcohol use: Yes     Comment: social    Drug use: Never     Review of Systems    Physical Exam     Initial Vitals [23]   BP Pulse Resp Temp SpO2   (!) 141/73 (!) 119 18 98.7 °F (37.1 °C) 96 %      MAP       --         Physical Exam  General: Awake and alert, well-nourished  HENT: moist mucous membranes  Eyes: No conjunctival injection  Pulm: CTAB, no increased work of breathing  CV: Tachycardic, irregularly irregular rhythm, no murmur noted  Abdomen: Nondistended, non-tender to palpation  MSK: No LE edema  Skin: No rash noted  Neuro: No facial asymmetry, grossly normal movements of arms and legs  Psychiatric: Cooperative    ED Course   Procedures  Labs Reviewed   COMPREHENSIVE METABOLIC PANEL - Abnormal; Notable for the following components:       Result Value    Alkaline Phosphatase 48 (*)     All other components within normal limits   CBC W/ AUTO DIFFERENTIAL   TSH   TROPONIN I   ALCOHOL,MEDICAL (ETHANOL)   DRUG SCREEN PANEL, URINE EMERGENCY    Narrative:     Specimen Source->Urine   MAGNESIUM   MAGNESIUM    Narrative:     Add on MG per Dr. Mederos @ 21:23 pm to order # [id number]     EKG Readings: (Independently Interpreted)   Irregularly irregular rhythm, atrial flutter with variable AV block vs atrial fibrillation, ST depressions in inferior and lateral leads, inconsistent ST elevation in aVR, no STEMI.       Imaging Results    None          Medications   diltiaZEM 125 mg in dextrose 5% 125 mL infusion (non-titrating) (10 mg/hr Intravenous New Bag 23)   diltiaZEM injection 18 mg (18 mg Intravenous Given 23)   sodium chloride 0.9% bolus 1,000 mL 1,000 mL (0 mLs Intravenous Stopped 23)   flecainide tablet 100 mg (100 mg Oral Given 23)   LORazepam tablet 0.5 mg (0.5 mg Oral Given 23)    diltiaZEM injection 25.5 mg (25.5 mg Intravenous Given 11/17/23 2133)   flecainide tablet 100 mg (100 mg Oral Given 11/17/23 2132)   magnesium sulfate 2g in water 50mL IVPB (premix) (0 g Intravenous Stopped 11/17/23 2206)     Medical Decision Making  Initial concern for atrial fibrillation on EKG, maybe questionable atrial flutter.  Patient really wants to try to rhythm convert medically if possible but if not rate control.  I gave her home dose of flecainide that was due and started on IV diltiazem.  On re-evaluation she did not have much improvement with this.  I gave her 0.35 mg/kg dose of diltiazem and 1 additional dose of flecainide to see if this will rhythm convert, this would take total daily dose of flecainide for today to 300mg.  When I evaluated her again she still is tachycardic to the low 110s.  Diltiazem drip started.  Given that she has not converted to sinus rhythm I discussed with Cardiology fellow and had him look at the EKG given that I had question of possible atrial flutter and in that case she may need different type of medication.  Cardiology fellow feels more consistent with atrial flutter and recommended stopping flecainide and continuing diltiazem.  They stated to admit to Internal Medicine and have electrophysiology consult in the morning.  EKG did have ischemic changes which may be rate related or partly from atrial rhythm morphology, initial troponin reassuring and she does not have significant ACS symptoms but will repeat troponin around 1AM for a 6 hour troponin.  Admitted to medicine for further care, pt stable at time of admission.    Critical Care:  Date: 11/18/2023  Performed by: Dr. Sunil Bah  Authorized by: Dr. Sunil Bah  Total critical care time (exclusive of procedural time) : 50 minutes  Critical care was necessary to treat or prevent imminent or life-threatening deterioration of the following conditions:  Atrial flutter with RVR      Amount and/or Complexity  of Data Reviewed  Labs: ordered.    Risk  Prescription drug management.  Decision regarding hospitalization.    Critical Care  Total time providing critical care: 50 minutes                                 Clinical Impression:  Final diagnoses:  [I48.91] Atrial fibrillation  [I48.91] Atrial fibrillation with RVR (Primary)          ED Disposition Condition    Admit                 Sunil Bah MD  11/18/23 8058

## 2023-11-18 NOTE — ASSESSMENT & PLAN NOTE
Chronic, controlled. Latest blood pressure and vitals reviewed-     Temp:  [97.6 °F (36.4 °C)-98.7 °F (37.1 °C)]   Pulse:  []   Resp:  [16-20]   BP: (127-164)/()   SpO2:  [96 %-98 %] .   Home meds for hypertension were reviewed and noted below.   Hypertension Medications               diltiaZEM HCl (CARDIZEM LA) 240 mg 24 hr tablet Take 1 tablet by mouth once daily    hydroCHLOROthiazide (HYDRODIURIL) 25 MG tablet Take 1 tablet (25 mg total) by mouth once daily.            While in the hospital, will manage blood pressure as follows; Continue home antihypertensive regimen    Will utilize p.r.n. blood pressure medication only if patient's blood pressure greater than 180/110 and she develops symptoms such as worsening chest pain or shortness of breath.

## 2023-11-18 NOTE — PLAN OF CARE
Patient is alert and oriented with no communication barriers. Patient is not on coumadin or dialysis at this time. Patient will transport home at discharge with her family.   11/18/23 1123   Discharge Assessment   Assessment Type Discharge Planning Assessment   Confirmed/corrected address, phone number and insurance Yes   Confirmed Demographics Correct on Facesheet   Source of Information patient   Do you expect to return to your current living situation? Yes   Do you have help at home or someone to help you manage your care at home? No   Prior to hospitilization cognitive status: Alert/Oriented   Current cognitive status: Alert/Oriented   Equipment Currently Used at Home none   Readmission within 30 days? No   Patient currently being followed by outpatient case management? No   Do you currently have service(s) that help you manage your care at home? No   Do you take prescription medications? Yes   Do you have prescription coverage? Yes   Coverage [employer]   Do you have any problems affording any of your prescribed medications? No   Is the patient taking medications as prescribed? yes   Who is going to help you get home at discharge? Family   How do you get to doctors appointments? family or friend will provide;health plan transportation   Are you on dialysis? No   Do you take coumadin? No   DME Needed Upon Discharge  none   Transition of Care Barriers None   Discharge Plan A Home   Discharge Plan B Home with family     [facility] - Cardiology Stepdown  Discharge Final Note    Primary Care Provider: Tu Marquis MD    Expected Discharge Date: 11/18/2023    Final Discharge Note (most recent)       Final Note - 11/18/23 1123          Final Note    Assessment Type Discharge Planning Assessment (P)         Post-Acute Status    Coverage [employer] (P)                      Important Message from Medicare             Contact Info       Tu Marquis MD   Specialty: Family Medicine    Relationship: PCP - General    [address] [address]   Phone: [phone]       Next Steps: Schedule an appointment as soon as possible for a visit    [facility] - Cardiology - [facility]   Specialty: Cardiology    [address]  [place] [address]   Phone: [phone]       Next Steps: Schedule an appointment as soon as possible for a visit

## 2023-11-18 NOTE — PROGRESS NOTES
Nurses Note -- 4 Eyes      11/18/2023   1:52 AM      Skin assessed during: Admit      [x] No Altered Skin Integrity Present    []Prevention Measures Documented      [] Yes- Altered Skin Integrity Present or Discovered   [] LDA Added if Not in Epic (Describe Wound)   [] New Altered Skin Integrity was Present on Admit and Documented in LDA   [] Wound Image Taken    Wound Care Consulted? No    Attending Nurse:  CARLOS Brooks    Second RN/Staff Member:   ANDREW Hood

## 2023-11-18 NOTE — H&P
[facility] - Emergency Dept  Hospital Medicine  History & Physical    Patient Name: Rachel Silver  MRN: [id number]  Patient Class: IP- Inpatient  Admission Date: 11/17/2023  Attending Physician: Sarah Lloyd MD   Primary Care Provider: Tu Marquis MD         Patient information was obtained from patient, past medical records, and ER records.     Subjective:     Principal Problem:Atrial flutter    Chief Complaint:   Chief Complaint   Patient presents with    Atrial Fibrillation     Sent from         HPI: Ms. Silver is a 77 y.o. female with HTN, HLD, pAF (follows in EP clinic; on flecanide, dilt, xarelto) who presents for palpitations. She was in her usual state of health until this afternoon around 2pm while cleaning in her home, she bent down and suddenly felt palpitations. She reports palpitations hasn't occurred since January when it only lasted for 20 mins. She denies LH, dyspnea, chest pain, PND, orthopnea. She takes 240mg Dilt, flecainide 100mg BID and is compliant with her meds. She also takes xarelto 20mg in the mornings without a meal and has missed a couple of doses. Denies   She follows with EP and general cardiology. She reports recent stressors and drinks wine only socially (had 2  glasses of wine while at a show yesterday).     Upon arrival to the ED, she was hemodynamically stable. HR in 120s. ECG with suspected atypical atrial flutter. She received 1L of IVF and 2 doses of her flecainide. Cardiology was contacted and it was suggested that flecainide be held until EP is consulted. She was started on a diltiazem drip. She is admitted to Hospital Medicine for further management.          Past Medical History:   Diagnosis Date    Atrial fibrillation     Cataracts, bilateral     Hoarse     Hypertension     UTI (urinary tract infection)     Vaginal delivery     x3       Past Surgical History:   Procedure Laterality Date    CYSTOSCOPY      HYSTERECTOMY      LARYNGOSCOPY N/A 06/11/2020     Procedure: LARYNGOSCOPY;  Surgeon: Yesenia Durham MD;  Location: [facility] OR;  Service: ENT;  Laterality: N/A;  RN PRE OP, COVID NEGATIVE-- 2020 [place]  HAS-Cardiac Clearance today  [facility]    MICROLARYNGOSCOPY N/A 2020    Procedure: MICROLARYNGOSCOPY;  Surgeon: Yesenia Durham MD;  Location: [facility] OR;  Service: ENT;  Laterality: N/A;    Microlaryngoscopy with biopsy vocal cords      TONSILLECTOMY      torn meniscus      VOCAL FOLD LESION EXCISION  2020    Procedure: EXCISION, LESION, VOCAL CORD, USING LASER;  Surgeon: Yesenia Durham MD;  Location: [facility] OR;  Service: ENT;;       Review of patient's allergies indicates:  No Known Allergies    Current Facility-Administered Medications on File Prior to Encounter   Medication    doxycycline tablet 100 mg    sodium chloride 0.9% flush 10 mL     Current Outpatient Medications on File Prior to Encounter   Medication Sig    diltiaZEM HCl (CARDIZEM LA) 240 mg 24 hr tablet Take 1 tablet by mouth once daily    estradioL (ESTRACE) 0.01 % (0.1 mg/gram) vaginal cream Place 1 g vaginally 3 (three) times a week. Place by fingertip application before bedtime three times a week (Monday, Wednesday, Friday)    flecainide (TAMBOCOR) 100 MG Tab Take 1 tablet (100 mg total) by mouth every 12 (twelve) hours.    hydroCHLOROthiazide (HYDRODIURIL) 25 MG tablet Take 1 tablet (25 mg total) by mouth once daily.    rivaroxaban (XARELTO) 20 mg Tab Take 1 tablet (20 mg total) by mouth once daily.    rosuvastatin (CRESTOR) 40 MG Tab TAKE 1 TABLET BY MOUTH ONCE DAILY IN THE EVENING     Family History       Problem Relation (Age of Onset)    Heart disease Maternal Aunt, Maternal Uncle, Maternal Aunt, Maternal Aunt, Maternal Uncle    Heart failure Mother    Stroke Father, Brother          Tobacco Use    Smoking status: Former     Current packs/day: 0.00     Types: Cigarettes     Quit date: 10/9/1987     Years since quittin.1    Smokeless tobacco: Never   Substance and Sexual  Activity    Alcohol use: Yes     Comment: social    Drug use: Never    Sexual activity: Not Currently     Partners: Male     Review of Systems   Constitutional:  Positive for activity change. Negative for chills, diaphoresis and fever.   HENT: Negative.     Eyes:  Negative for discharge.   Respiratory:  Negative for shortness of breath.    Cardiovascular:  Positive for palpitations. Negative for chest pain and leg swelling.   Gastrointestinal:  Negative for abdominal pain, nausea and vomiting.   Endocrine: Negative.  Negative for cold intolerance and heat intolerance.   Genitourinary: Negative.  Negative for difficulty urinating, dysuria and frequency.   Musculoskeletal: Negative.  Negative for arthralgias.   Skin:  Negative for color change and pallor.   Allergic/Immunologic: Negative for immunocompromised state.   Neurological:  Negative for dizziness, weakness and light-headedness.   Psychiatric/Behavioral: Negative.  Negative for agitation, behavioral problems and confusion.      Objective:     Vital Signs (Most Recent):  Temp: 98.7 °F (37.1 °C) (11/17/23 2230)  Pulse: (!) 111 (11/17/23 2230)  Resp: 20 (11/17/23 2230)  BP: 127/78 (11/17/23 2230)  SpO2: 96 % (11/17/23 2230) Vital Signs (24h Range):  Temp:  [97.6 °F (36.4 °C)-98.7 °F (37.1 °C)] 98.7 °F (37.1 °C)  Pulse:  [] 111  Resp:  [16-20] 20  SpO2:  [96 %-98 %] 96 %  BP: (127-164)/() 127/78     Weight: 72.6 kg (160 lb)  Body mass index is 31.25 kg/m².     Physical Exam  Constitutional:       General: She is not in acute distress.     Appearance: Normal appearance. She is normal weight. She is not ill-appearing, toxic-appearing or diaphoretic.   HENT:      Head: Normocephalic and atraumatic.      Nose: Nose normal.      Mouth/Throat:      Mouth: Mucous membranes are moist.   Eyes:      Extraocular Movements: Extraocular movements intact.      Conjunctiva/sclera: Conjunctivae normal.      Pupils: Pupils are equal, round, and reactive to light.    Cardiovascular:      Rate and Rhythm: Tachycardia present. Rhythm irregular.      Pulses: Normal pulses.      Heart sounds: Normal heart sounds. No murmur heard.     No gallop.   Pulmonary:      Effort: Pulmonary effort is normal. No respiratory distress.      Breath sounds: Normal breath sounds. No wheezing or rales.   Abdominal:      General: Abdomen is flat. Bowel sounds are normal. There is no distension.      Tenderness: There is no abdominal tenderness.   Musculoskeletal:         General: No swelling or tenderness. Normal range of motion.      Cervical back: Normal range of motion.   Skin:     General: Skin is warm.      Coloration: Skin is not jaundiced.   Neurological:      Mental Status: She is alert and oriented to person, place, and time. Mental status is at baseline.   Psychiatric:         Mood and Affect: Mood normal.         Behavior: Behavior normal.         Thought Content: Thought content normal.              CRANIAL NERVES     CN III, IV, VI   Pupils are equal, round, and reactive to light.       Significant Labs: All pertinent labs within the past 24 hours have been reviewed.  BMP:   Recent Labs   Lab 11/17/23 1921   GLU 89      K 3.5      CO2 28   BUN 16   CREATININE 0.9   CALCIUM 10.3   MG 2.1     CBC:   Recent Labs   Lab 11/17/23 1921   WBC 8.31   HGB 14.7   HCT 41.3        CMP:   Recent Labs   Lab 11/17/23 1921      K 3.5      CO2 28   GLU 89   BUN 16   CREATININE 0.9   CALCIUM 10.3   PROT 8.0   ALBUMIN 4.6   BILITOT 0.3   ALKPHOS 48*   AST 16   ALT 11   ANIONGAP 12     Troponin:   Recent Labs   Lab 11/17/23 1921   TROPONINI 0.013       Significant Imaging: I have reviewed all pertinent imaging results/findings within the past 24 hours.  Imaging Results    None         Assessment/Plan:     * Atrial flutter  Patient with prior Hx of atrial fibrillation is admitted with suspected atypical atrial flutter .   Home meds: Flecanide 100mg BD, ditliazem 240mg,  xarelto 20mg qd (non compliant). Extra dose of flecanide was given in the ER. Dilt gtt started in the ER.  TTE in July with preserved EF.     Plan  - EP consulted, appreciate recs.   - Continue diltiazem drip. HR now in 110s. No other systemic underlying issues.  - Will hold flecanide for now given its ability to convert atrial fib into organized flutter. Further recs to be given by EP in the morning.   - Continue rivaroxaban with dinner. Counseled patient on importance of anticoagulation compliance  - TSH ordered  - No hx of BRENNAN, but she does have a hx of snoring, so will benefit from sleep study in the outpatient setting.       PAF (paroxysmal atrial fibrillation)  Patient with Paroxysmal (<7 days) atrial fibrillation which is controlled currently with Calcium Channel Blocker. Patient is currently in atrial flutter.[id number] Score: 3. . Anticoagulation indicated. Anticoagulation done with rivaroxaban .    HLD (hyperlipidemia)  Continue statin      Essential hypertension  Chronic, controlled. Latest blood pressure and vitals reviewed-     Temp:  [97.6 °F (36.4 °C)-98.7 °F (37.1 °C)]   Pulse:  []   Resp:  [16-20]   BP: (127-164)/()   SpO2:  [96 %-98 %] .   Home meds for hypertension were reviewed and noted below.   Hypertension Medications               diltiaZEM HCl (CARDIZEM LA) 240 mg 24 hr tablet Take 1 tablet by mouth once daily    hydroCHLOROthiazide (HYDRODIURIL) 25 MG tablet Take 1 tablet (25 mg total) by mouth once daily.            While in the hospital, will manage blood pressure as follows; Continue home antihypertensive regimen    Will utilize p.r.n. blood pressure medication only if patient's blood pressure greater than 180/110 and she develops symptoms such as worsening chest pain or shortness of breath.      VTE Risk Mitigation (From admission, onward)           Ordered     rivaroxaban tablet 20 mg  with dinner         11/17/23 2315                                    Lindanolman Obi,  MD  Department of Hospital Medicine  [facility] - Emergency Dept

## 2023-11-18 NOTE — DISCHARGE SUMMARY
Assessment and Plan:   Acute kidney injury: Patient seen by Dr. Carrion yesterday.  He felt this was due to nonsteroidal anti-inflammatory drug, Bactrim, Spironolactone in the setting of advanced liver disease.  She has had episodes of acute kidney injury in the past, she was discharged from [facility] on 11/15/19 after hospital stay for FIGUEROA, hepatic encephalopathy and  ESBL UTI.    Labs today show sodium 129, potassium 4.9, bicarbonate 15, creatinine 5.86.    She was treated with IV normal saline and IV albumin today.  She was also started on midodrine. I will start her on IVF with HCO3.  We will recheck her laboratories in the morning.  We will check a urine sodium and fractional excretion of sodium.  We will place her on a renal diet.            Interval History:   Alcoholism  Alcoholic liver disease: Cirrhosis, ascites, hepatic encephalopathy.                Review of Systems:   She complains of ecchymosis around the eyes and mouth.  She denies head trauma.  She is taking p.o. reasonably well without nausea or vomiting.  She denies shortness of breath or chest pain.  She complains of abdominal distention and lower extremity edema.          Medications:       albumin human  12.5 g Intravenous Once     cyanocobalamin  1,000 mcg Oral Daily     folic acid  1 mg Oral Daily     gabapentin  100 mg Oral Daily     lactulose  10 g Oral Daily     lidocaine   Transdermal Q8H     lidocaine   Transdermal Q24H     melatonin  5 mg Oral At Bedtime     midodrine  10 mg Oral TID w/meals     multivitamin, therapeutic  1 tablet Oral Daily     omeprazole  20 mg Oral Daily     sodium chloride (PF)  3 mL Intracatheter Q8H     traZODone  75 mg Oral At Bedtime     vilazodone  40 mg Oral Daily     vitamin B1  100 mg Oral Daily     vitamin D3  2,000 Units Oral Daily         Current active medications and PTA medications reviewed, see medication list for details.            Physical Exam:   Vitals were reviewed  Patient Vitals for the past  [facility] - Cardiology [facility] Medicine  Discharge Summary      Patient Name: Rachel Silver  MRN: [id number]  EFRAÍN: [id number]  Patient Class: IP- Inpatient  Admission Date: 11/17/2023  Hospital Length of Stay: 1 days  Discharge Date and Time:  11/18/2023 2:18 PM  Attending Physician: Sarah Lloyd MD   Discharging Provider: Sarah Lloyd MD  Primary Care Provider: Tu Marquis MD  [facility] Medicine Team: [facility]  Sarah Lloyd MD  Primary Care Team: [facility]    HPI:   Ms. Silver is a 77 y.o. female with HTN, HLD, pAF (follows in EP clinic; on flecanide, dilt, xarelto) who presents for palpitations. She was in her usual state of health until this afternoon around 2pm while cleaning in her home, she bent down and suddenly felt palpitations. She reports palpitations hasn't occurred since January when it only lasted for 20 mins. She denies LH, dyspnea, chest pain, PND, orthopnea. She takes 240mg Dilt, flecainide 100mg BID and is compliant with her meds. She also takes xarelto 20mg in the mornings without a meal and has missed a couple of doses. Denies   She follows with EP and general cardiology. She reports recent stressors and drinks wine only socially (had 2  glasses of wine while at a show yesterday).     Upon arrival to the ED, she was hemodynamically stable. HR in 120s. ECG with suspected atypical atrial flutter. She received 1L of IVF and 2 doses of her flecainide. Cardiology was contacted and it was suggested that flecainide be held until EP is consulted. She was started on a diltiazem drip. She is admitted to Hospital Medicine for further management.        Hospital Course:   Patient started on dilt gtt on admission. She converted back to normal sinus rhythm. Transitioned back to dilt 240mg qday. Discussed with EP- ok for patient to continue w/home flecainide and dilt. Amb ref for EP for f/u.     Pt deemed appropriate for discharge. Plan discussed with pt, who  24 hrs:   BP Temp Temp src Heart Rate Resp SpO2 Weight   19 0842 (!) 78/34 -- -- 104 18 96 % --   19 0732 (!) 72/ 98.4  F (36.9  C) Oral 105 17 96 % --   19 0549 (!) 8936 -- -- -- -- -- 78.8 kg (173 lb 12.8 oz)   19 0300 (!) 8032 98.6  F (37  C) Oral 100 18 96 % --   19 0125 (!)  98  F (36.7  C) Oral 100 18 -- --   19 0055 (!)  -- -- -- -- -- --   19 0042 (!)  -- -- -- -- 97 % --   19 0041 (!)  98.3  F (36.8  C) Oral 102 18 -- --   19 2031 (!) 9431 99  F (37.2  C) Oral 96 18 97 % --   19 1610 95/41 96.3  F (35.7  C) Oral 96 18 98 % --       Temp:  [96.3  F (35.7  C)-99  F (37.2  C)] 98.4  F (36.9  C)  Heart Rate:  [] 104  Resp:  [17-18] 18  BP: (72-95)/(31-41) 78/34  SpO2:  [96 %-98 %] 96 %    Temperatures:  Current - Temp: 98.4  F (36.9  C); Max - Temp  Av.1  F (36.7  C)  Min: 96.3  F (35.7  C)  Max: 99  F (37.2  C)  Respiration range: Resp  Av.9  Min: 17  Max: 18  Pulse range: No data recorded  Blood pressure range: Systolic (24hrs), Av , Min:72 , Max:95   ; Diastolic (24hrs), Av, Min:31, Max:41    Pulse oximetry range: SpO2  Av.7 %  Min: 96 %  Max: 98 %    No intake/output data recorded.    No intake or output data in the 24 hours ending 19 1516    Alert and responsive  Marked periorbital ecchymosis and perioral ecchymosis  Spider angiomata across the chest  Icterus  Lungs with clear breath sounds  Cardiac exam regular rate and rhythm normal S1-S2 a 2/6 systolic murmur  Abdomen markedly distended with positive fluid wave nontender no pedal splenomegaly can be palpated  Lower extremities 2+ edema       Wt Readings from Last 4 Encounters:   19 78.8 kg (173 lb 12.8 oz)   19 77.2 kg (170 lb 3.2 oz)   11/15/19 74.3 kg (163 lb 11.2 oz)   19 72.5 kg (159 lb 12.8 oz)          Data:          Lab Results   Component Value Date     2019     2019      was agreeable and amenable; medications were discussed and reviewed, outpatient follow-up scheduled, ER precautions were given, all questions were answered to the pt's satisfaction, and Mrs. Silver was subsequently discharged.    Physical Exam  Gen: in NAD, appears stated age  Neuro: AAOx3, motor, sensory, and strength grossly intact BL  HEENT: NTNC, EOMI, PERRL, MMM  CVS: RRR, no m/r/g; S1/S2 auscultated with no S3 or S4; capillary refill < 2 sec  Resp: lungs CTAB, no w/r/r; no belabored breathing or accessory muscle use appreciated   Abd: BS+ in all 4 quadrants; NTND, soft to palpation; no organomegaly appreciated   Extrem: pulses full, equal, and regular over all 4 extremities; no UE or LE edema BL       Goals of Care Treatment Preferences:  Code Status: Full Code    Consults:   Consults (From admission, onward)          Status Ordering Provider     Inpatient consult to Electrophysiology  Once        Provider:  (Not yet assigned)    Completed OBI, KOYENUM          Final Active Diagnoses:    Diagnosis Date Noted POA    PRINCIPAL PROBLEM:  Atrial flutter [I48.92] 11/17/2023 Yes    HLD (hyperlipidemia) [E78.5] 06/08/2020 Yes    PAF (paroxysmal atrial fibrillation) [I48.0] 12/27/2019 Yes    Essential hypertension [I10] 07/26/2019 Yes      Problems Resolved During this Admission:     Discharged Condition: stable    Disposition: Home or Self Care    Follow Up:   Follow-up Information       Tu Marquis MD. Schedule an appointment as soon as possible for a visit .    Specialty: Family Medicine  Contact information:  [address] [address] [phone]               [facility] - Cardiology - [facility]. Schedule an appointment as soon as possible for a visit .    Specialty: Cardiology  Contact information:  [address]  [facility] [address] [phone]  Additional information:  Cardiology Services Clinics - 3rd floor                         Patient Instructions:      Ambulatory  11/20/2019    Lab Results   Component Value Date    CHLORIDE 103 11/23/2019    CHLORIDE 103 11/22/2019    CHLORIDE 106 11/20/2019    Lab Results   Component Value Date    BUN 62 11/23/2019    BUN 55 11/22/2019    BUN 48 11/20/2019      Lab Results   Component Value Date    POTASSIUM 4.9 11/23/2019    POTASSIUM 4.6 11/22/2019    POTASSIUM 5.1 11/20/2019    Lab Results   Component Value Date    CO2 15 11/23/2019    CO2 13 11/22/2019    CO2 15 11/20/2019    Lab Results   Component Value Date    CR 5.86 11/23/2019    CR 5.44 11/22/2019    CR 4.76 11/20/2019        Recent Labs   Lab Test 11/23/19  0612 11/22/19  0948 11/20/19  1253   WBC 8.4 7.0 7.4   HGB 7.0* 7.2* 8.2*   HCT 20.2* 20.6* 24.3*   MCV 95 97 96   PLT 53* 63* 71*     Recent Labs   Lab Test 11/23/19  0612 11/22/19  0948 11/20/19  1253  11/10/19  2153  10/12/19  1412   AST 39 51* 39   < >  --    < > 70*   ALT 19 19 22   < >  --    < > 38   ALKPHOS 79 100 115   < >  --    < > 174*   BILITOTAL 0.8 0.7 0.7   < >  --    < > 1.5*   BARBRA  --  23  --   --  69*  --  60*    < > = values in this interval not displayed.       Recent Labs   Lab Test 11/20/19  1253 11/12/19  0747 11/11/19  0105   MAG 1.4* 1.9 2.1     Recent Labs   Lab Test 11/11/19  0610 10/13/19  0930 10/12/19  1412   PHOS 4.2 4.0 3.6     Recent Labs   Lab Test 11/23/19  0612 11/22/19  0948 11/20/19  1253   KARLEY 7.2* 7.1* 8.1*       Lab Results   Component Value Date    KARLEY 7.2 (L) 11/23/2019     Lab Results   Component Value Date    WBC 8.4 11/23/2019    HGB 7.0 (L) 11/23/2019    HCT 20.2 (L) 11/23/2019    MCV 95 11/23/2019    PLT 53 (L) 11/23/2019     Lab Results   Component Value Date     (L) 11/23/2019    POTASSIUM 4.9 11/23/2019    CHLORIDE 103 11/23/2019    CO2 15 (L) 11/23/2019    GLC 66 (L) 11/23/2019     Lab Results   Component Value Date    BUN 62 (H) 11/23/2019    CR 5.86 (H) 11/23/2019     Lab Results   Component Value Date    MAG 1.4 (L) 11/20/2019     Lab Results   Component Value Date  referral/consult to Cardiac Electrophysiology   Standing Status: Future   Referral Priority: Routine Referral Type: Consultation   Referral Reason: Specialty Services Required   Referred to Provider: LOVELY DIXON Requested Specialty: Cardiology   Number of Visits Requested: 1     Diet Cardiac     Notify your health care provider if you experience any of the following:  difficulty breathing or increased cough     Notify your health care provider if you experience any of the following:   Order Comments: Palpitations- chest pain     Activity as tolerated       Significant Diagnostic Studies: Labs: CMP   Recent Labs   Lab 11/17/23 1921 11/18/23  0647    143   K 3.5 3.7    107   CO2 28 30*   GLU 89 112*   BUN 16 17   CREATININE 0.9 0.9   CALCIUM 10.3 8.9   PROT 8.0 6.2   ALBUMIN 4.6 3.7   BILITOT 0.3 0.4   ALKPHOS 48* 36*   AST 16 13   ALT 11 8*   ANIONGAP 12 6*    and CBC   Recent Labs   Lab 11/17/23 1921 11/18/23  0647   WBC 8.31 7.73   HGB 14.7 12.5   HCT 41.3 34.9*    238       Pending Diagnostic Studies:       None           Medications:  Reconciled Home Medications:      Medication List        CONTINUE taking these medications      diltiaZEM HCl 240 mg 24 hr tablet  Commonly known as: CARDIZEM LA  Take 1 tablet by mouth once daily     estradioL 0.01 % (0.1 mg/gram) vaginal cream  Commonly known as: ESTRACE  Place 1 g vaginally 3 (three) times a week. Place by fingertip application before bedtime three times a week (Monday, Wednesday, Friday)     flecainide 100 MG Tab  Commonly known as: TAMBOCOR  Take 1 tablet (100 mg total) by mouth every 12 (twelve) hours.     hydroCHLOROthiazide 25 MG tablet  Commonly known as: HYDRODIURIL  Take 1 tablet (25 mg total) by mouth once daily.     rivaroxaban 20 mg Tab  Commonly known as: XARELTO  Take 1 tablet (20 mg total) by mouth once daily.     rosuvastatin 40 MG Tab  Commonly known as: CRESTOR  TAKE 1 TABLET BY MOUTH ONCE DAILY IN THE EVENING               Indwelling Lines/Drains at time of discharge:   Lines/Drains/Airways       None                   Time spent on the discharge of patient: 35 minutes           Sarah Lloyd MD  Department of Hospital Medicine  [facility] - Cardiology Stepdown      PHOS 4.2 11/11/2019       Creatinine   Date Value Ref Range Status   11/23/2019 5.86 (H) 0.52 - 1.04 mg/dL Final   11/22/2019 5.44 (H) 0.52 - 1.04 mg/dL Final   11/20/2019 4.76 (H) 0.52 - 1.04 mg/dL Final   11/15/2019 1.14 (H) 0.52 - 1.04 mg/dL Final   11/14/2019 1.24 (H) 0.52 - 1.04 mg/dL Final   11/14/2019 1.47 (H) 0.52 - 1.04 mg/dL Final       Attestation:  I have reviewed today's vital signs, notes, medications, labs and imaging.     Kg Solorzano MD

## 2023-11-18 NOTE — ED NOTES
Assumed care of patient. Pt. Roomed in 23. Placed in gown and non skid socks. Pt placed on cardiac monitor, continuous pulse ox, and cycling blood pressures. Bed placed in low locked position, side rails up x2, call bell is within reach. Warm blanket and pillow provided to patient. Will continue to monitor.

## 2023-11-18 NOTE — PLAN OF CARE
Patient will transport home at discharge with family. No additional needs.   11/18/23 1415   Final Note   Assessment Type Final Discharge Note   Anticipated Discharge Disposition Home   Hospital Resources/Appts/Education Provided Provided patient/caregiver with written discharge plan information   Post-Acute Status   Discharge Delays None known at this time     [facility] Stepdown  Discharge Final Note    Primary Care Provider: Tu Marquis MD    Expected Discharge Date: 11/18/2023    Final Discharge Note (most recent)       Final Note - 11/18/23 1415          Final Note    Assessment Type Final Discharge Note (P)      Anticipated Discharge Disposition Home or Self Care (P)      Hospital Resources/Appts/Education Provided Provided patient/caregiver with written discharge plan information (P)         Post-Acute Status    Discharge Delays None known at this time (P)                      Important Message from Medicare             Contact Info       Tu Marquis MD   Specialty: Family Medicine   Relationship: PCP - General    [address] [address]   Phone: [phone]       Next Steps: Schedule an appointment as soon as possible for a visit    [facility] - Cardiology - [facility]   Specialty: Cardiology    [address]  [facility] [address]   Phone: [phone]       Next Steps: Schedule an appointment as soon as possible for a visit

## 2023-11-18 NOTE — ASSESSMENT & PLAN NOTE
Patient with Paroxysmal (<7 days) atrial fibrillation which is controlled currently with Calcium Channel Blocker. Patient is currently in atrial flutter.[id number] Score: 3. . Anticoagulation indicated. Anticoagulation done with rivaroxaban .

## 2023-11-18 NOTE — ASSESSMENT & PLAN NOTE
Patient with prior Hx of atrial fibrillation is admitted with suspected atypical atrial flutter .   Home meds: Flecanide 100mg BD, ditliazem 240mg, xarelto 20mg qd (non compliant). Extra dose of flecanide was given in the ER. Dilt gtt started in the ER.  TTE in July with preserved EF.     Plan  - EP consulted, appreciate recs.   - Continue diltiazem drip. HR now in 110s. No other systemic underlying issues.  - Will hold flecanide for now given its ability to convert atrial fib into organized flutter. Further recs to be given by EP in the morning.   - Continue rivaroxaban with dinner. Counseled patient on importance of anticoagulation compliance  - TSH ordered  - No hx of BRENNAN, but she does have a hx of snoring, so will benefit from sleep study in the outpatient setting.

## 2023-11-18 NOTE — HOSPITAL COURSE
Patient started on dilt gtt on admission. She converted back to normal sinus rhythm. Transitioned back to dilt 240mg qday. Discussed with EP- ok for patient to continue w/home flecainide and dilt. Amb ref for EP for f/u.     Pt deemed appropriate for discharge. Plan discussed with pt, who was agreeable and amenable; medications were discussed and reviewed, outpatient follow-up scheduled, ER precautions were given, all questions were answered to the pt's satisfaction, and Mrs. Silver was subsequently discharged.    Physical Exam  Gen: in NAD, appears stated age  Neuro: AAOx3, motor, sensory, and strength grossly intact BL  HEENT: NTNC, EOMI, PERRL, MMM  CVS: RRR, no m/r/g; S1/S2 auscultated with no S3 or S4; capillary refill < 2 sec  Resp: lungs CTAB, no w/r/r; no belabored breathing or accessory muscle use appreciated   Abd: BS+ in all 4 quadrants; NTND, soft to palpation; no organomegaly appreciated   Extrem: pulses full, equal, and regular over all 4 extremities; no UE or LE edema BL

## 2023-11-18 NOTE — SUBJECTIVE & OBJECTIVE
Past Medical History:   Diagnosis Date    Atrial fibrillation     Cataracts, bilateral     Hoarse     Hypertension     UTI (urinary tract infection)     Vaginal delivery     x3       Past Surgical History:   Procedure Laterality Date    CYSTOSCOPY      HYSTERECTOMY      LARYNGOSCOPY N/A 06/11/2020    Procedure: LARYNGOSCOPY;  Surgeon: Yesenia Durham MD;  Location: [facility] OR;  Service: ENT;  Laterality: N/A;  RN PRE OP, COVID NEGATIVE-- 6-8-2020 [place]  HAS-Cardiac Clearance today 6-8 OM    MICROLARYNGOSCOPY N/A 07/09/2020    Procedure: MICROLARYNGOSCOPY;  Surgeon: Yesenia Durham MD;  Location: [facility] OR;  Service: ENT;  Laterality: N/A;    Microlaryngoscopy with biopsy vocal cords      TONSILLECTOMY      torn meniscus      VOCAL FOLD LESION EXCISION  07/09/2020    Procedure: EXCISION, LESION, VOCAL CORD, USING LASER;  Surgeon: Yesenia Durham MD;  Location: [facility] OR;  Service: ENT;;       Review of patient's allergies indicates:  No Known Allergies    Current Facility-Administered Medications on File Prior to Encounter   Medication    doxycycline tablet 100 mg    sodium chloride 0.9% flush 10 mL     Current Outpatient Medications on File Prior to Encounter   Medication Sig    diltiaZEM HCl (CARDIZEM LA) 240 mg 24 hr tablet Take 1 tablet by mouth once daily    estradioL (ESTRACE) 0.01 % (0.1 mg/gram) vaginal cream Place 1 g vaginally 3 (three) times a week. Place by fingertip application before bedtime three times a week (Monday, Wednesday, Friday)    flecainide (TAMBOCOR) 100 MG Tab Take 1 tablet (100 mg total) by mouth every 12 (twelve) hours.    hydroCHLOROthiazide (HYDRODIURIL) 25 MG tablet Take 1 tablet (25 mg total) by mouth once daily.    rivaroxaban (XARELTO) 20 mg Tab Take 1 tablet (20 mg total) by mouth once daily.    rosuvastatin (CRESTOR) 40 MG Tab TAKE 1 TABLET BY MOUTH ONCE DAILY IN THE EVENING     Family History       Problem Relation (Age of Onset)    Heart disease Maternal Aunt, Maternal Uncle,  Maternal Aunt, Maternal Aunt, Maternal Uncle    Heart failure Mother    Stroke Father, Brother          Tobacco Use    Smoking status: Former     Current packs/day: 0.00     Types: Cigarettes     Quit date: 10/9/1987     Years since quittin.1    Smokeless tobacco: Never   Substance and Sexual Activity    Alcohol use: Yes     Comment: social    Drug use: Never    Sexual activity: Not Currently     Partners: Male     Review of Systems   Constitutional:  Positive for activity change. Negative for chills, diaphoresis and fever.   HENT: Negative.     Eyes:  Negative for discharge.   Respiratory:  Negative for shortness of breath.    Cardiovascular:  Positive for palpitations. Negative for chest pain and leg swelling.   Gastrointestinal:  Negative for abdominal pain, nausea and vomiting.   Endocrine: Negative.  Negative for cold intolerance and heat intolerance.   Genitourinary: Negative.  Negative for difficulty urinating, dysuria and frequency.   Musculoskeletal: Negative.  Negative for arthralgias.   Skin:  Negative for color change and pallor.   Allergic/Immunologic: Negative for immunocompromised state.   Neurological:  Negative for dizziness, weakness and light-headedness.   Psychiatric/Behavioral: Negative.  Negative for agitation, behavioral problems and confusion.      Objective:     Vital Signs (Most Recent):  Temp: 98.7 °F (37.1 °C) (23)  Pulse: (!) 111 (23)  Resp: 20 (23)  BP: 127/78 (23)  SpO2: 96 % (23) Vital Signs (24h Range):  Temp:  [97.6 °F (36.4 °C)-98.7 °F (37.1 °C)] 98.7 °F (37.1 °C)  Pulse:  [] 111  Resp:  [16-20] 20  SpO2:  [96 %-98 %] 96 %  BP: (127-164)/() 127/78     Weight: 72.6 kg (160 lb)  Body mass index is 31.25 kg/m².     Physical Exam  Constitutional:       General: She is not in acute distress.     Appearance: Normal appearance. She is normal weight. She is not ill-appearing, toxic-appearing or diaphoretic.   HENT:       Head: Normocephalic and atraumatic.      Nose: Nose normal.      Mouth/Throat:      Mouth: Mucous membranes are moist.   Eyes:      Extraocular Movements: Extraocular movements intact.      Conjunctiva/sclera: Conjunctivae normal.      Pupils: Pupils are equal, round, and reactive to light.   Cardiovascular:      Rate and Rhythm: Tachycardia present. Rhythm irregular.      Pulses: Normal pulses.      Heart sounds: Normal heart sounds. No murmur heard.     No gallop.   Pulmonary:      Effort: Pulmonary effort is normal. No respiratory distress.      Breath sounds: Normal breath sounds. No wheezing or rales.   Abdominal:      General: Abdomen is flat. Bowel sounds are normal. There is no distension.      Tenderness: There is no abdominal tenderness.   Musculoskeletal:         General: No swelling or tenderness. Normal range of motion.      Cervical back: Normal range of motion.   Skin:     General: Skin is warm.      Coloration: Skin is not jaundiced.   Neurological:      Mental Status: She is alert and oriented to person, place, and time. Mental status is at baseline.   Psychiatric:         Mood and Affect: Mood normal.         Behavior: Behavior normal.         Thought Content: Thought content normal.              CRANIAL NERVES     CN III, IV, VI   Pupils are equal, round, and reactive to light.       Significant Labs: All pertinent labs within the past 24 hours have been reviewed.  BMP:   Recent Labs   Lab 11/17/23 1921   GLU 89      K 3.5      CO2 28   BUN 16   CREATININE 0.9   CALCIUM 10.3   MG 2.1     CBC:   Recent Labs   Lab 11/17/23 1921   WBC 8.31   HGB 14.7   HCT 41.3        CMP:   Recent Labs   Lab 11/17/23 1921      K 3.5      CO2 28   GLU 89   BUN 16   CREATININE 0.9   CALCIUM 10.3   PROT 8.0   ALBUMIN 4.6   BILITOT 0.3   ALKPHOS 48*   AST 16   ALT 11   ANIONGAP 12     Troponin:   Recent Labs   Lab 11/17/23 1921   TROPONINI 0.013       Significant Imaging: I have  reviewed all pertinent imaging results/findings within the past 24 hours.  Imaging Results    None

## 2023-12-28 ENCOUNTER — TELEPHONE (OUTPATIENT)
Dept: SPORTS MEDICINE | Facility: CLINIC | Age: 78
End: 2023-12-28
Payer: MEDICARE

## 2023-12-28 DIAGNOSIS — M25.562 ACUTE PAIN OF LEFT KNEE: Primary | ICD-10-CM

## 2023-12-28 NOTE — TELEPHONE ENCOUNTER
Spoke to the patient in regards to requested call back. While on the call the patient informed me that the pain in her left knee increased approximately 1 week ago after stepping off the bus. She reports the pain is at the back of the knee. Offered the patient an appointment on 1/3/2024 at the [place] location with Amy Singh PA-C. The patient declined due to location. Offered the patient an appointment on 1/4/2024 at 4:00pm with Amy Singh PA-C at the [facility] location. The patient accepted the appointment. Informed the patient of scheduled x-ray at 3:15 pm.

## 2023-12-28 NOTE — TELEPHONE ENCOUNTER
----- Message from Ami Camara sent at 12/28/2023  9:46 AM CST -----  Regarding: appt  Name of who is calling:   Rachel      What is the request in detail: Pt is requesting a call back in ref to getting a sooner appt than 02/08/2024 due to back of knee pain possibly torn ligament after stepping down      Can the clinic reply by [facility]:yes      What number to call back if not [facility]: [phone]

## 2024-01-04 ENCOUNTER — OFFICE VISIT (OUTPATIENT)
Dept: SPORTS MEDICINE | Facility: CLINIC | Age: 79
End: 2024-01-04
Payer: MEDICARE

## 2024-01-04 ENCOUNTER — HOSPITAL ENCOUNTER (OUTPATIENT)
Dept: RADIOLOGY | Facility: HOSPITAL | Age: 79
Discharge: HOME | End: 2024-01-04
Attending: PHYSICIAN ASSISTANT
Payer: MEDICARE

## 2024-01-04 VITALS
HEIGHT: 65 IN | WEIGHT: 166.25 LBS | DIASTOLIC BLOOD PRESSURE: 56 MMHG | SYSTOLIC BLOOD PRESSURE: 111 MMHG | BODY MASS INDEX: 27.7 KG/M2 | HEART RATE: 68 BPM

## 2024-01-04 DIAGNOSIS — M25.562 ACUTE PAIN OF LEFT KNEE: ICD-10-CM

## 2024-01-04 DIAGNOSIS — M17.12 PRIMARY OSTEOARTHRITIS OF LEFT KNEE: Primary | ICD-10-CM

## 2024-01-04 PROCEDURE — 73564 X-RAY EXAM KNEE 4 OR MORE: CPT | Mod: TC,50,PN

## 2024-01-04 PROCEDURE — 99214 OFFICE O/P EST MOD 30 MIN: CPT | Mod: S$GLB,,, | Performed by: PHYSICIAN ASSISTANT

## 2024-01-04 PROCEDURE — 73564 X-RAY EXAM KNEE 4 OR MORE: CPT | Mod: 26,50,, | Performed by: RADIOLOGY

## 2024-01-04 PROCEDURE — 99999 PR PBB SHADOW E&M-EST. PATIENT-LVL III: CPT | Mod: PBBFAC,,, | Performed by: PHYSICIAN ASSISTANT

## 2024-01-04 NOTE — PROGRESS NOTES
Subjective:          Chief Complaint: Rachel Silver is a 78 y.o. female who had concerns including Pain of the Left Knee.    Patient presents to clinic for follow up left knee pain. Pain began approximately 3 months ago. She remembers pain beginning when crossing her left leg over her right to do a pedicure on herself. Pain is located along the medial aspect of her knee. Pain increases at night when turning in bed. Swelling increases with increased activity. Pain at rest is 0/10. Pain at its worst is 5/10. She has been using a topical cream over her knee as needed. She is here today to discuss treatment options. Denies any previous history of injections in her knee.    2 weeks ago, she reports stepping off a [employer] bus and experiencing pain in the posterior aspect of her left knee.  This pain has been decreasing daily and completely resolved 2 days ago.     Previous history of right knee medial meniscus repair at [facility]           Review of Systems   Constitutional: Negative. Negative for chills, fever, weight gain and weight loss.   HENT:  Negative for congestion and sore throat.    Eyes:  Negative for blurred vision and double vision.   Cardiovascular:  Negative for chest pain, leg swelling and palpitations.   Respiratory:  Negative for cough and shortness of breath.    Hematologic/Lymphatic: Does not bruise/bleed easily.   Skin:  Negative for itching, poor wound healing and rash.   Musculoskeletal:  Positive for joint pain. Negative for back pain, joint swelling, muscle weakness, myalgias and stiffness.   Gastrointestinal:  Negative for abdominal pain, constipation, diarrhea, nausea and vomiting.   Genitourinary: Negative.  Negative for frequency and hematuria.   Neurological:  Negative for dizziness, headaches, numbness, paresthesias and sensory change.   Psychiatric/Behavioral:  Negative for altered mental status and depression. The patient is not nervous/anxious.    Allergic/Immunologic: Negative for hives.                    Objective:        General: Rachel is well-developed, well-nourished, appears stated age, in no acute distress, alert and oriented to time, place and person.     General    Vitals reviewed.  Constitutional: She is oriented to person, place, and time. She appears well-developed and well-nourished. No distress.   HENT:   Head: Normocephalic and atraumatic.   Eyes: EOM are normal.   Cardiovascular:  Normal rate and regular rhythm.            Pulmonary/Chest: Effort normal. No respiratory distress.   Neurological: She is alert and oriented to person, place, and time. She has normal reflexes. No cranial nerve deficit. Coordination normal.   Psychiatric: She has a normal mood and affect. Her behavior is normal. Judgment and thought content normal.     General Musculoskeletal Exam   Gait: abnormal and antalgic       Right Knee Exam     Inspection   Erythema: absent  Scars: absent  Swelling: absent  Effusion: absent  Deformity: absent  Bruising: absent    Tenderness   The patient is experiencing no tenderness.     Crepitus   The patient has crepitus of the patella.    Range of Motion   Extension:  0 normal   Flexion:  130 normal     Tests   Meniscus   Amari:  Medial - negative Lateral - negative  Ligament Examination   Lachman: normal (-1 to 2mm)   PCL-Posterior Drawer: normal (0 to 2mm)     MCL - Valgus: normal (0 to 2mm)  LCL - Varus: normal  Pivot Shift: normal (Equal)  Reverse Pivot Shift: normal (Equal)  Posterolateral Corner: stable  Patella   Passive Patellar Tilt: neutral  Patellar Tracking: normal  Patellar Glide (quadrants): Lateral - 1   Medial - 2  Patellar Grind: negative    Other   Sensation: normal    Left Knee Exam     Inspection   Erythema: absent  Scars: absent  Swelling: absent  Effusion: absent  Deformity: absent  Bruising: absent    Tenderness   The patient tender to palpation of the medial joint line and pes anserinus.    Crepitus   The patient has crepitus of the  patella.    Range of Motion   Extension:  0 normal   Flexion:  130 normal     Tests   Meniscus   Amari:  Medial - positive Lateral - negative  Stability   Lachman: normal (-1 to 2mm)   PCL-Posterior Drawer: normal (0 to 2mm)  MCL - Valgus: normal (0 to 2mm)  LCL - Varus: normal (0 to 2mm)  Pivot Shift: normal (Equal)  Reverse Pivot Shift: normal (Equal)  Posterolateral Corner: stable  Patella   Passive Patellar Tilt: neutral  Patellar Tracking: normal  Patellar Glide (Quadrants): Lateral - 1 Medial - 2  Patellar Grind: negative    Other   Sensation: normal    Muscle Strength   Right Lower Extremity   Hip Abduction: 5/5   Quadriceps:  5/5   Hamstrin/5   Left Lower Extremity   Hip Abduction: 5/5   Quadriceps:  5/5   Hamstrin/5     Reflexes     Left Side  Achilles:  2+  Quadriceps:  2+    Right Side   Achilles:  2+  Quadriceps:  2+    Vascular Exam     Right Pulses  Dorsalis Pedis:      2+  Posterior Tibial:      2+        Left Pulses  Dorsalis Pedis:      2+  Posterior Tibial:      2+        RADIOGRAPHS: 10/19/23  Bilateral knees:  FINDINGS:  Joint spaces are narrowed medially with degenerative change.  Patellofemoral degenerative changes seen with no joint effusion.      Assessment:       Encounter Diagnosis   Name Primary?    Primary osteoarthritis of left knee Yes         Plan:       We have discussed a variety of treatment options including medications, injections, physical therapy and other alternative treatments. I also explained the indications, risks and benefits of surgery.  Patient's pain is refractory HEP, conservative management, and NSAIDs. Pt would like to proceed with visco-supplementation.    Medical Necessity for viscosupplementation use: After thorough evaluation of the patient, I have determined that viscosupplementation treatment is medically necessary. The patient has painful degenerative joint disease (DJD) of the knee(s) with failure of conservative treatments including lifestyle  modifications and rehabilitation exercises. Oral analgesics including NSAIDs have not adequately controlled the patient's symptoms. There is radiographic evidence of Kellgren-Taj grade II (or greater) osteoarthritic (OA) changes, or if lack of radiographic evidence, there is arthroscopic or other evidence of chondrosis of the knee(s).     I made the decision to obtain old records of the patient including previous notes and imaging. I independently reviewed and interpreted lab results today as well as prior imaging.  Reviewed with patient in detail.    1. Pre-authorization placed for left Synvisc-One injection.  2. Ice compress to the affected area 2-3x a day for 15-20 minutes as needed for pain management.  3. Information on Synvisc one provided to patient.  4.  RTC to see Amy Singh PA-C for left Synvisc-One injection in 2 weeks.    All of the patient's questions were answered and the patient will contact us if they have any questions or concerns in the interim.                          Patient questionnaires may have been collected.

## 2024-01-04 NOTE — PATIENT INSTRUCTIONS
DESCRIPTION  Synvisc-One (hylan G-F 20) is an elastoviscous high molecular weight fluid containing hylan A and hylan B polymers produced from chicken barton. Hylans are derivatives of hyaluronan (sodium hyaluronate). Hylan G-F 20 is unique in that the hyaluronan is chemically cross linked. Hyaluronan is a long-chain polymer containing repeating disaccharide units of Ch-isyqvrsmtmm-I-acetylglucosamine.     INDICATIONS FOR USE  Synvisc-One is indicated for the treatment of pain in osteoarthritis (OA) of the knee in patients who have failed to respond adequately to conservative nonpharmacologic therapy and simple analgesics, e.g., acetaminophen.     Who is a candidate for Synvisc-One  Patients with knee osteoarthritis, who have tried diet, exercise and over-the-counter pain medication but still have pain, should talk to their doctor to see if Synvisc-One is right for them.     How Synvisc-One is administered  Synvisc-One is a single injection. It's a simple, in-office procedure that only takes a few minutes.     What you can expect following a Synvisc-One knee injection Synvisc-One can provide up to six months of osteoarthritis knee pain relief. Everyone responds differently, but in a medical study* patients experienced relief starting one month after the injection.     After the injection, you can resume normal day-to-day activities, but you should avoid any strenuous activities for about 48 hours.     Contraindication  Do not administer to patients with known hypersensitivity (allergy) to hyaluronan (sodium hyaluronate) preparations.   Do not inject Synvisc-One in the knees of patients having knee joint infections or skin diseases or infections in the area of theinjection site.    What are possible side effects?  Synvisc may occur short-term pain, swelling at the injection site and / or the appearance of synovial exudate after injection. In some cases, exudation may be significant and cause more prolonged pain.      Important Safety Information  Before trying Synvisc-One or SYNVISC, tell your doctor if you are allergic to products from birds - such as feathers, eggs or poultry - or if your leg is swollen or infected. Synvisc-One and SYNVISC are only for injection into the knee, performed by a doctor or other qualified health care professional. Synvisc-One and SYNVISC have not been tested to show pain relief in joints other than the knee. Talk to your doctor before resuming strenuous weight-bearing activities after treatment. Synvisc-One and SYNVISC have not been tested in children, pregnant women or women who are nursing. You should tell your doctor if you think you are pregnant or if you are nursing a child. The side effects most commonly seen when Synvisc-One or SYNVISC is injected into the knee were pain, swelling and/or fluid buildup in or around the knee. Cases where the swelling is extensive or painful should be discussed with your doctor. Allergic reactions such as rash and hives have been reported rarely.

## 2024-01-29 ENCOUNTER — TELEPHONE (OUTPATIENT)
Dept: SPORTS MEDICINE | Facility: CLINIC | Age: 79
End: 2024-01-29
Payer: MEDICARE

## 2024-01-29 NOTE — TELEPHONE ENCOUNTER
----- Message from Albertina Linda sent at 1/29/2024 10:04 AM CST -----  Regarding: Appt  Contact: pt [phone]  Pt is calling to reschedule injection for 2/1 due to conflicting appts and locations, please call pt @ [phone]

## 2024-01-29 NOTE — TELEPHONE ENCOUNTER
Spoke to the patient in regard to her appointment on 2/1 with Amy Singh PA-C. While on the call the patient informed that she has another appointment on 2/1 at a different location and she's trying to avoid being late. I offered the patient a latter appointment time on 2/1 at 12pm. The patient accepted the appointment and stated she will call if she feels she will be running late.

## 2024-02-01 ENCOUNTER — OFFICE VISIT (OUTPATIENT)
Dept: OTOLARYNGOLOGY | Facility: CLINIC | Age: 79
End: 2024-02-01
Payer: MEDICARE

## 2024-02-01 ENCOUNTER — OFFICE VISIT (OUTPATIENT)
Dept: SPORTS MEDICINE | Facility: CLINIC | Age: 79
End: 2024-02-01
Payer: MEDICARE

## 2024-02-01 VITALS
SYSTOLIC BLOOD PRESSURE: 155 MMHG | DIASTOLIC BLOOD PRESSURE: 78 MMHG | HEART RATE: 67 BPM | BODY MASS INDEX: 27.22 KG/M2 | HEIGHT: 65 IN | WEIGHT: 163.38 LBS

## 2024-02-01 VITALS — WEIGHT: 166 LBS | HEIGHT: 65 IN | BODY MASS INDEX: 27.66 KG/M2

## 2024-02-01 DIAGNOSIS — M17.12 PRIMARY OSTEOARTHRITIS OF LEFT KNEE: Primary | ICD-10-CM

## 2024-02-01 DIAGNOSIS — R49.0 DYSPHONIA: ICD-10-CM

## 2024-02-01 DIAGNOSIS — R09.89 THROAT CLEARING: Primary | ICD-10-CM

## 2024-02-01 DIAGNOSIS — J30.2 SEASONAL ALLERGIC RHINITIS, UNSPECIFIED TRIGGER: ICD-10-CM

## 2024-02-01 DIAGNOSIS — D02.0 SQUAMOUS CELL CARCINOMA IN SITU (SCCIS) OF TRUE VOCAL CORD: ICD-10-CM

## 2024-02-01 DIAGNOSIS — J34.3 HYPERTROPHY OF INFERIOR NASAL TURBINATE: ICD-10-CM

## 2024-02-01 PROCEDURE — 20610 DRAIN/INJ JOINT/BURSA W/O US: CPT | Mod: LT,S$GLB,, | Performed by: PHYSICIAN ASSISTANT

## 2024-02-01 PROCEDURE — 99214 OFFICE O/P EST MOD 30 MIN: CPT | Mod: 25,S$GLB,, | Performed by: OTOLARYNGOLOGY

## 2024-02-01 PROCEDURE — 99999 PR PBB SHADOW E&M-EST. PATIENT-LVL III: CPT | Mod: PBBFAC,,, | Performed by: PHYSICIAN ASSISTANT

## 2024-02-01 PROCEDURE — 31575 DIAGNOSTIC LARYNGOSCOPY: CPT | Mod: S$GLB,,, | Performed by: OTOLARYNGOLOGY

## 2024-02-01 PROCEDURE — 99499 UNLISTED E&M SERVICE: CPT | Mod: S$GLB,,, | Performed by: PHYSICIAN ASSISTANT

## 2024-02-01 RX ORDER — AZELASTINE 1 MG/ML
1 SPRAY, METERED NASAL 2 TIMES DAILY
Qty: 30 ML | Refills: 3 | Status: SHIPPED | OUTPATIENT
Start: 2024-02-01

## 2024-02-01 RX ORDER — FLUTICASONE PROPIONATE 50 MCG
2 SPRAY, SUSPENSION (ML) NASAL 2 TIMES DAILY
Qty: 18.2 ML | Refills: 3 | Status: SHIPPED | OUTPATIENT
Start: 2024-02-01

## 2024-02-01 NOTE — PROGRESS NOTES
Patient is here for follow up of  left knee arthritis. Pt is requesting left knee Synvisc one injection.  [facility]. Has failed other conservative modalities including NSAIDS, activity modification, weight loss.    The prior shot was tolerated well.    PHYSICAL EXAMINATION:     General: The patient is alert and oriented x 3. Mood is pleasant.   Observation of ears, eyes and nose reveals no gross abnormalities. No   labored breathing observed.     No signs of infection or adverse reaction to knee.    PROCEDURE NOTE:  Injection Procedure left knee  A time out was performed, including verification of patient ID, procedure, site and side, availability of information and equipment, review of safety issues, and agreement with consent, the procedure site was marked.    After time out was performed, the patient was prepped aseptically with povidone-iodine swabsticks. A diagnostic and therapeutic injection of 6cc SynviscOne was given under sterile technique using a 22g x 1.5 needle from the Superolateral  aspect of the left Knee Joint in the supine position.      Rachel Silver had no adverse reactions to the medication. Pain decreased. She was instructed to apply ice to the joint for 20 minutes and avoid strenuous activities for 24-36 hours following the injection. She was warned of possible blood sugar and/or blood pressure changes during that time. Following that time, she can resume regular activities.    She was reminded to call the clinic immediately for any adverse side effects as explained in clinic today.    RTC in 3 months with Amy Singh PA-C for follow up.  All questions were answered, pt will contact us for questions or concerns in the interim.

## 2024-02-01 NOTE — PROGRESS NOTES
OTOLARYNGOLOGY CLINIC NOTE  Date:  02/01/2024     Chief complaint:  Chief Complaint   Patient presents with    Follow-up     Over due 4 month follow up for ca surveillance of the true VC       History of Present Illness  Rachel Silver is a 78 y.o. female  presenting today for a followup.  Has been getting hoarse off and on but has been having a lot of throat clearing too. Has only been for the past week or two and not consistent. Is getting chest xray   No throat pain   No issues with swallowing  Sometimes throat feels dry but does not drink a lot of water  No hemoptysis  No heartburn  +congestion and postnasal drip takes zyrtec has problems doing nasal sprays  When allergies flaring up gets left earache but omes and geos and is mild    I last saw the patient on 3-27 - 23. Below text is copied from  note on that date describing history of present illness at that time :Sore throat that swaps sides has been off and on for about 6 weeks . Last seen 9-21-22.   Ear ache mostly on right side , feels it more when sinuses are really bad. She can't take flonase   Throat clearing a lot  Takes nexium now; has been having worse reflux. Nexium gives her diarrhea   No pain with swallowing  Voice is same. Has raspy voice but unchanged     She underwent DL biopsy which was concerning for SCCa in situ, could not rule out invasive scca (6-11-20) who underwent microlaryngeal excision of vocal cord lesion with CO2 laser on 7-9-20 of her left true vocal fold. CO2 laser also used to vaporize resection bed. Pathology from cord excision showed moderate dysplasia with some crush artifact and sectioning artifact but did not appear to have any residual scca in situ.       Past Medical History  Past Medical History:   Diagnosis Date    Atrial fibrillation     Cataracts, bilateral     Hoarse     Hypertension     UTI (urinary tract infection)     Vaginal delivery     x3        Past Surgical History  Past Surgical History:   Procedure  Laterality Date    CYSTOSCOPY      HYSTERECTOMY      LARYNGOSCOPY N/A 06/11/2020    Procedure: LARYNGOSCOPY;  Surgeon: Yesenia Durham MD;  Location: [facility] OR;  Service: ENT;  Laterality: N/A;  RN PRE OP, COVID NEGATIVE-- 6-8-2020 [place]  HAS-Cardiac Clearance today 6-8 [facility]    MICROLARYNGOSCOPY N/A 07/09/2020    Procedure: MICROLARYNGOSCOPY;  Surgeon: Yesenia Durham MD;  Location: [facility] OR;  Service: ENT;  Laterality: N/A;    Microlaryngoscopy with biopsy vocal cords      TONSILLECTOMY      torn meniscus      VOCAL FOLD LESION EXCISION  07/09/2020    Procedure: EXCISION, LESION, VOCAL CORD, USING LASER;  Surgeon: Yesenia Durham MD;  Location: [facility] OR;  Service: ENT;;        Medications  Current Outpatient Medications on File Prior to Visit   Medication Sig Dispense Refill    diltiaZEM HCl (CARDIZEM LA) 240 mg 24 hr tablet Take 1 tablet by mouth once daily 90 tablet 3    flecainide (TAMBOCOR) 100 MG Tab Take 1 tablet (100 mg total) by mouth every 12 (twelve) hours. 180 tablet 3    hydroCHLOROthiazide (HYDRODIURIL) 25 MG tablet Take 1 tablet (25 mg total) by mouth once daily. 90 tablet 3    rivaroxaban (XARELTO) 20 mg Tab Take 1 tablet (20 mg total) by mouth once daily. 90 tablet 3    rosuvastatin (CRESTOR) 40 MG Tab TAKE 1 TABLET BY MOUTH ONCE DAILY IN THE EVENING 90 tablet 3    estradioL (ESTRACE) 0.01 % (0.1 mg/gram) vaginal cream Place 1 g vaginally 3 (three) times a week. Place by fingertip application before bedtime three times a week (Monday, Wednesday, Friday) 45 g 3     No current facility-administered medications on file prior to visit.       Review of Systems  Review of Systems   Constitutional: Negative.    HENT:  Positive for ear pain.    Eyes: Negative.    Respiratory:  Positive for cough.    Gastrointestinal: Negative.    Genitourinary: Negative.    Musculoskeletal:  Positive for back pain.   Skin: Negative.    Neurological:  Positive for headaches.   Psychiatric/Behavioral: Negative.      Answers  "submitted by the patient for this visit:  Review of Symptoms Questionnaire  (Submitted on 2/1/2024)  sinus pressure : Yes  postnasal drip: Yes  Irregular heartbeat?: Yes  Seasonal Allergies?: Yes    Social History   reports that she quit smoking about 36 years ago. Her smoking use included cigarettes. She has never used smokeless tobacco. She reports current alcohol use. She reports that she does not use drugs.     Family History  Family History   Problem Relation Age of Onset    Heart failure Mother     Stroke Father     Stroke Brother     Heart disease Maternal Aunt     Heart disease Maternal Uncle     Heart disease Maternal Aunt     Heart disease Maternal Aunt     Heart disease Maternal Uncle         Physical Exam   There were no vitals filed for this visit. Body mass index is 27.62 kg/m².  Weight: 75.3 kg (166 lb)   Height: 5' 5" (165.1 cm)     GENERAL: no acute distress.  HEAD: normocephalic.   EYES: No scleral icterus  EARS: external ear without lesion, normal pinna shape and position.    NOSE: external nose without significant bony abnormality  ORAL CAVITY/OROPHARYNX: tongue mobile.   NECK: trachea midline.   LYMPH NODES:No cervical lymphadenopathy.  RESPIRATORY: no stridor, no stertor. Voice slightly raspy . Respirations nonlabored.  NEURO: alert, responds to questions appropriately.    PSYCH:mood appropriate    PROCEDURE NOTE  NAME OF PROCEDURE: Flexible Laryngoscopy, diagnostic  INDICATIONS: gag reflex precludes mirror exam, dysphonia; h/o scca in situ  FINDINGS: turbinate hypertrophy; stable scar on vocal fold. No mass or lesion     Consent: After procedure was explained in detail and all questions answered, verbal consent was obtained for performing flexible laryngoscopy.  Anesthesia: topical 4% lidocaine and neosynephrine  Procedure: With patient in seated position, the scope was inserted into the bilateral nasal passageway and advanced atraumatically into the nasopharynx to examine the following " structures:  Nasal cavity: Turbinates with  hypertrophy. no middle meatal edema. No purulent drainage.   Nasopharynx: no mass or lesion noted in nasopharynx.   Oropharynx: base of tongue without  mass or ulceration. Lingual tonsils normal in appearance  Hypopharynx: posterior pharyngeal wall without mass or lesion. No pooling of secretions. Pyriform sinuses visible without mass or lesion  Larynx: epiglottis normal without lesion. False vocal folds without edema/erythema/lesion. True vocal folds mobile and without lesion. Small scar from prior excision of scca in situ unchanged no interarytenoid edema no erythema . Postcricoid region without edema no lesion   Subglottis: visualized portion of subglottis normal in appearance    After examination performed, the scope was removed atraumatically . The patient tolerated the procedure well. Photodocumentation obtained with representative images below, all images and/or videos uploaded in media section of epic.                                      Imaging:  The patient does not have any new imaging of the head and neck since last visit.     Labs:  CBC  Recent Labs   Lab 11/17/23  1921 11/18/23  0647   WBC 8.31 7.73   Hemoglobin 14.7 12.5   Hematocrit 41.3 34.9 L   MCV 86 85   Platelets 267 238     BMP  Recent Labs   Lab 02/10/23  0919 11/17/23  1921 11/18/23  0647   Glucose 100 89 112 H   Sodium 139 145 143   Potassium 3.5 3.5 3.7   Chloride 103 105 107   CO2 26 28 30 H   BUN 13 16 17   Creatinine 0.8 0.9 0.9   Calcium 9.5 10.3 8.9   Phosphorus  --   --  3.5   Magnesium  --  2.1 2.3     COAGS        Assessment  1. Throat clearing  - Ambulatory referral/consult to Speech Therapy; Future    2. Seasonal allergic rhinitis, unspecified trigger    3. Hypertrophy of inferior nasal turbinate    4. Dysphonia    5. 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 Patient reported  understanding of plan of care. I gave the patient the opportunity to ask questions and patient confirmed all questions answered to satisfaction.     Vocal fold that had scca in situ s/p excision - no recurrent disease noted, has  slight scar but looks stable compared to prior scope exam photos  Hoarse- recent onset but I want to do close folow up and may need dl which I discsused with her. Because it just started and she is also having allergy flare up/sinus drip and scope stable today will monitor but stressed importance about close follow up as could be something small and subtle and want to ensure scope continues to look stable and voice improves because if not I will take her to there operating room to evaluate. I will repeat flex and see if responding after sprays and control of throat clearing. If flex looks normal and still having issues may need dl to ensure normal, will determine based on overall picture at follow up.   Refersher with slp   Discussed otc recs and vocal hygiene for throat clearing   Reviewed symptoms to notify me of for immediate apptmt should those develop     F/u 4-6 weeks with flex       Please be aware that this note has been generated with the assistance of Issa voice-to-text.  Please excuse any spelling or grammatical errors.

## 2024-02-01 NOTE — PATIENT INSTRUCTIONS
Information and instructions from your visit with me today:      Clearing your throat leads to more swelling in the voice box.  This will worsen your symptoms.  You should try to minimize throat clearing as much as possible.    Can try gaviscon liquid over the counter - take 15 minutes after a meal as needed for throat phlegm    Avoid mouthwash with alcohol such as listerine. You should use biotene mouth wash    Can sleep with bedside humidifier     You should aim to drink 2 to 3 liters of water daily if you are drinking one cup of coffee.  If you have trouble drinking water, you can cut back or cut out caffeine. ouble drinking water, you can cut back or cut out caffeine.    Lozenges:  Halls Breezers - sold with cough drops, Can try sugar free lozenges with pectin ,  such as halls fruit breezers    Xylimelts, on toothpaste aisle, these are convenient for when you are talking and or sleeping - they stick to gumline and provide moisture - [employer] or Amazon        Steam and gargle - 3x day  You may consider getting a facial steamer, breathe in warm moist air  through mouth and nose to hydrate vocal folds. On amazon, I like the [employer] version that is under $25.        Gargle:   1/2 tsp each salt, baking soda, clear corn syrup, 6 oz warm water  Sip, gargle quietly, spit, repeat until gone, don't eat/drink for 30 minutes after.      Chew gum with baking soda after meals, Orbit White     Order online, when it's time to get more Gaviscon, either Alginate therapy such as Reflux Gourmet  or Gaviscon Advance can be used following meals and at bedtime for reflux coverage. The Acid Watcher Diet by Dr Null as well as the Chronic Cough [place] by Dr Cohen are good reads to gain improved understanding of dietary and behavioral changes that can aid in reduction of LPRD, and to better understand cough and cough control     www.acidwatcher.com  Always use saline every time before a medication spray. You can also use saline on its own. If  you are using saline and/or the medication sprays on an as needed basis and you have symptoms use the regimen daily for at least 2 weeks. You can use the flonase and astelin together, or if you prefer to start with just one medication spray, the flonase works better by itself compared to astelin by itself. You can try doing the saline and flonase and if still congested, add on the astelin again doing this regimen daily for up to two weeks when congestion. There may be times of the year that you only need saline and there may be times of the year that you need saline, flonase and astelin to control symptoms.   Start using the following medication nasal sprays:   Fluticasone spray:    This medication is a steroid spray. It stays within the nose and does not have absorption into the body that leads to side effects that one has with oral steroid medication. Fluticasone nasal spray is the same as the Flonase brand nasal spray. Discuss with your pharmacist if the price is lower over the counter or with a prescription ( this varies depending on insurance). The medication that is over the counter is the same as the prescription medication. Use this medication as instructed on the prescription, 1-2 sprays on each side of your nose twice daily.     Azelastine  spray:  This medication is an antihistamine used to treat nasal symptoms of allergy, which works specifically in the nose unlike antihistamine pills which have more of an effect on the whole body. Use this medication as instructed on the prescription, 1 spray on each side of your nose twice daily.     Additional instructions for medication sprays  Place the tip of the medication bottle in your nose and aim slightly up and out on each side to get medication high and deep into your nose and sinuses, and not have it all deposit in the very front of your nose. Aim the tip of the nozzle towards the outer corner of your eye . You can imagine aiming towards the back of your  "eyeball on each side for this, as opposed to straight back to the center of your nose and head.     You need to use this medication every day regardless of symptoms, as it takes time ( a few weeks) to work and get the benefits. It does not work on an "as needed" basis like taking a decongestant. If your symptoms only occur in a particular season, then the medication can be used seasonally instead of year long. For seasonal symptoms, you should start using the spray twice daily a month before when you normally have symptoms ( for example, if symptoms start in August, should start at the end of June).     Start nasal irrigations with saline solution- you can either use a rinse or a mist spray:    NASAL SALINE SPRAY ( simply saline and arm and hammer are examples) There are several different brands found in the cold and flu aisle of the pharmacy. You can use any brand of saline spray - this will deliver the saline by a gentle mist ( if you have difficulty or discomfort with nasal rinse/ a lot of fluid in the nose, this will be more comfortable).       Always rinse your nose with saline prior to using medication sprays and wait a couple of hours before using again. You can use the saline throughout the day to help with stuffy nose or dry nose.    Do not use nasal decongestant sprays such as Afrin or similar products long term ( over 3 days) .  This can cause long term physical nasal addiction. Afrin should only be used if having nose bleeds, severe nasal congestion , or severe ear pain/fullness and should not be used for more than 2-3 days in a row . It is a not a medication that should be used for a long period of time.     It was nice meeting you today, and I look forward to helping you feel better soon. Please don't hesitate to call if you have any other questions or concerns, or if I can be of any assistance in the meantime.      Yesenia Durham MD    [facility]     Phone  [phone]    Fax      " [phone]        Yesenia Durham MD  Otorhinolaryngology

## 2024-02-08 RX ORDER — RIVAROXABAN 20 MG/1
20 TABLET, FILM COATED ORAL
Qty: 30 TABLET | Refills: 3 | Status: SHIPPED | OUTPATIENT
Start: 2024-02-08 | End: 2024-05-31

## 2024-02-19 PROBLEM — Z79.01 ON ANTICOAGULANT THERAPY: Status: ACTIVE | Noted: 2024-02-19

## 2024-02-19 PROBLEM — I70.0 AORTIC ATHEROSCLEROSIS: Status: ACTIVE | Noted: 2024-02-19

## 2024-02-19 NOTE — PROGRESS NOTES
Ms. Silver is a patient of Dr. Perez and was last seen in clinic 7/6/2023.      Subjective:   Patient ID:  Rachel Silver is a 78 y.o. female who presents for follow up of Atrial Fibrillation  .     HPI:    Ms. Silver is a 78 y.o. female with HTN, HLD, pAF here for hospital follow up.     Background:    Tachypalpitations on and off for years. Usually goes away by itself.  Had them several times per month. Lasts 5-10 mins.   Great exertion tolerance.  We started flecainide with a great response. Only 1 episode c/w AF, self-limited, for years.  echo 7/19 LVEF 40% (during rapid AF) -> 12/2019 60%  Holter 7/19 SR, PAF/AFL  PET neg  My interpretation of today's ECG is SB 57 bpm  Doing well on flec / dilt / xarelto. Continue.    7/6/2023.: Stable from rhythm standpoint. Maintaining sinus rhythm on flecainide. ECG with stable intervals. Narrow QRS. Gold but no LH. Echo shows preserved LV function. CHADSVASc 4 on xarelto. Follows with general cardiology.    Update (02/20/2024):    11/18/2023: Pt presented to hospital in AF. She reports recent stressors and drinks wine only socially (had 2 glasses of wine while at a show yesterday).   Patient started on dilt gtt on admission. She converted back to normal sinus rhythm. Transitioned back to dilt 240mg qday. Discussed with EP- ok for patient to continue w/home flecainide and dilt.   TSH WNL    Today she says she believes she was quite dehydrated prior to last hospital visit. No palpitations since discharge. Primary complaint is left knee pain which she treats with tylenol. Has not been exercising as much as usual but does walk, swim at [employer]. She does say she is always moving with no limitations in activity tolerance reported. No CP, LEVINE, LH, syncope reported.    She is currently taking xarelto 20mg daily for stroke prophylaxis and denies significant bleeding episodes. She is currently being treated with flecainide 100mg BID for rhythm control and diltiazem  240mg daily for HR control. Kidney function is stable, with a creatinine of 0.9 on 11/18/2023.    I have personally reviewed the patient's EKG today, which shows sinus bradycardia at 54bpm. MD interval is 212. QRS is 96. QT is 420.    Relevant Cardiac Test Results:    2D Echo (7/3/2023):  The left ventricle is normal in size with normal systolic function.  The estimated ejection fraction is 63%.  Indeterminate left ventricular diastolic function.  Normal right ventricular size with normal right ventricular systolic function.  Moderate left atrial enlargement.  Mild right atrial enlargement.  Mild tricuspid regurgitation.  Normal central venous pressure (3 mmHg).  The estimated PA systolic pressure is 28 mmHg.    Current Outpatient Medications   Medication Sig    azelastine (ASTELIN) 137 mcg (0.1 %) nasal spray 1 spray (137 mcg total) by Nasal route 2 (two) times daily.    diltiaZEM HCl (CARDIZEM LA) 240 mg 24 hr tablet Take 1 tablet by mouth once daily    flecainide (TAMBOCOR) 100 MG Tab Take 1 tablet (100 mg total) by mouth every 12 (twelve) hours.    hydroCHLOROthiazide (HYDRODIURIL) 25 MG tablet Take 1 tablet (25 mg total) by mouth once daily.    rosuvastatin (CRESTOR) 40 MG Tab TAKE 1 TABLET BY MOUTH ONCE DAILY IN THE EVENING    XARELTO 20 mg Tab Take 1 tablet by mouth once daily    estradioL (ESTRACE) 0.01 % (0.1 mg/gram) vaginal cream Place 1 g vaginally 3 (three) times a week. Place by fingertip application before bedtime three times a week (Monday, Wednesday, Friday)    fluticasone propionate (FLONASE) 50 mcg/actuation nasal spray 2 sprays (100 mcg total) by Each Nostril route 2 (two) times daily. (Patient not taking: Reported on 2/20/2024)     No current facility-administered medications for this visit.       Review of Systems   Constitutional: Negative for malaise/fatigue.   Cardiovascular:  Negative for chest pain, dyspnea on exertion, irregular heartbeat, leg swelling and palpitations.   Respiratory:   "Negative for shortness of breath.    Hematologic/Lymphatic: Negative for bleeding problem.   Skin:  Negative for rash.   Musculoskeletal:  Negative for myalgias.   Gastrointestinal:  Negative for hematemesis, hematochezia and nausea.   Genitourinary:  Negative for hematuria.   Neurological:  Negative for light-headedness.   Psychiatric/Behavioral:  Negative for altered mental status.    Allergic/Immunologic: Negative for persistent infections.       Objective:          /60   Pulse (!) 54   Ht 5' 5" (1.651 m)   Wt 74.6 kg (164 lb 7.4 oz)   BMI 27.37 kg/m²     Physical Exam  Vitals and nursing note reviewed.   Constitutional:       Appearance: Normal appearance. She is well-developed.   HENT:      Head: Normocephalic.      Nose: Nose normal.   Eyes:      Pupils: Pupils are equal, round, and reactive to light.   Cardiovascular:      Rate and Rhythm: Regular rhythm. Bradycardia present.   Pulmonary:      Effort: No respiratory distress.      Breath sounds: Normal breath sounds.   Musculoskeletal:         General: Normal range of motion.   Skin:     General: Skin is warm and dry.      Findings: No erythema.   Neurological:      Mental Status: She is alert and oriented to person, place, and time.   Psychiatric:         Speech: Speech normal.         Behavior: Behavior normal.           Lab Results   Component Value Date     11/18/2023    K 3.7 11/18/2023    MG 2.3 11/18/2023    BUN 17 11/18/2023    CREATININE 0.9 11/18/2023    ALT 8 (L) 11/18/2023    AST 13 11/18/2023    HGB 12.5 11/18/2023    HCT 34.9 (L) 11/18/2023    TSH 1.531 11/17/2023    LDLCALC 61 05/22/2023    LDLCALC 66.2 09/06/2019             Assessment:     1. PAF (paroxysmal atrial fibrillation)    2. Aortic atherosclerosis    3. Atrial flutter, unspecified type    4. Essential hypertension    5. On anticoagulant therapy    6. Encounter for monitoring flecainide therapy    7. Atrial fibrillation with RVR      Plan:     In summary, Ms. Silver is " a 78 y.o. female with HTN, HLD, pAF here for hospital follow up.   She is 3 mo s/p hospital visit for AF breakthrough. Says she was dehydrated (and report of wine intake day prior) which was likely trigger. Otherwise she has been feeling well. Very busy during [place].   We discussed PVI. She would like to wait and see if AF recurs unprovoked which is reasonable. On flecainide 100mg BID. Gold but no LH. Narrow QRS.  CHADSVASc 4 on xarelto.    Continue current regimen  Consider PVI if another AF breakthrough on flecainide  RTC 6 mo, sooner if needed    *A copy of this note has been sent to Dr. Perez*    Follow up in about 6 months (around 8/20/2024).    ------------------------------------------------------------------    CHANTEL Alfaro, NP-C  Cardiac Electrophysiology

## 2024-02-20 ENCOUNTER — OFFICE VISIT (OUTPATIENT)
Dept: ELECTROPHYSIOLOGY | Facility: CLINIC | Age: 79
End: 2024-02-20
Payer: MEDICARE

## 2024-02-20 ENCOUNTER — HOSPITAL ENCOUNTER (OUTPATIENT)
Dept: CARDIOLOGY | Facility: CLINIC | Age: 79
Discharge: HOME | End: 2024-02-20
Payer: MEDICARE

## 2024-02-20 VITALS
HEIGHT: 65 IN | WEIGHT: 164.44 LBS | BODY MASS INDEX: 27.4 KG/M2 | DIASTOLIC BLOOD PRESSURE: 60 MMHG | SYSTOLIC BLOOD PRESSURE: 131 MMHG | HEART RATE: 54 BPM

## 2024-02-20 DIAGNOSIS — I49.8 OTHER SPECIFIED CARDIAC ARRHYTHMIAS: ICD-10-CM

## 2024-02-20 DIAGNOSIS — Z51.81 ENCOUNTER FOR MONITORING FLECAINIDE THERAPY: ICD-10-CM

## 2024-02-20 DIAGNOSIS — I48.92 ATRIAL FLUTTER, UNSPECIFIED TYPE: ICD-10-CM

## 2024-02-20 DIAGNOSIS — I48.91 ATRIAL FIBRILLATION WITH RVR: ICD-10-CM

## 2024-02-20 DIAGNOSIS — Z79.01 ON ANTICOAGULANT THERAPY: ICD-10-CM

## 2024-02-20 DIAGNOSIS — Z79.899 ENCOUNTER FOR MONITORING FLECAINIDE THERAPY: ICD-10-CM

## 2024-02-20 DIAGNOSIS — I70.0 AORTIC ATHEROSCLEROSIS: ICD-10-CM

## 2024-02-20 DIAGNOSIS — I48.0 PAF (PAROXYSMAL ATRIAL FIBRILLATION): Primary | ICD-10-CM

## 2024-02-20 DIAGNOSIS — I10 ESSENTIAL HYPERTENSION: ICD-10-CM

## 2024-02-20 LAB
OHS QRS DURATION: 96 MS
OHS QTC CALCULATION: 398 MS

## 2024-02-20 PROCEDURE — 93010 ELECTROCARDIOGRAM REPORT: CPT | Mod: S$GLB,,, | Performed by: INTERNAL MEDICINE

## 2024-02-20 PROCEDURE — 99999 PR PBB SHADOW E&M-EST. PATIENT-LVL III: CPT | Mod: PBBFAC,,, | Performed by: NURSE PRACTITIONER

## 2024-02-20 PROCEDURE — 93005 ELECTROCARDIOGRAM TRACING: CPT | Mod: S$GLB,,, | Performed by: INTERNAL MEDICINE

## 2024-02-20 PROCEDURE — 99214 OFFICE O/P EST MOD 30 MIN: CPT | Mod: S$GLB,,, | Performed by: NURSE PRACTITIONER

## 2024-03-14 ENCOUNTER — OFFICE VISIT (OUTPATIENT)
Dept: OTOLARYNGOLOGY | Facility: CLINIC | Age: 79
End: 2024-03-14
Payer: MEDICARE

## 2024-03-14 VITALS
SYSTOLIC BLOOD PRESSURE: 132 MMHG | WEIGHT: 164.88 LBS | HEIGHT: 65 IN | DIASTOLIC BLOOD PRESSURE: 72 MMHG | BODY MASS INDEX: 27.47 KG/M2

## 2024-03-14 DIAGNOSIS — R49.0 DYSPHONIA: ICD-10-CM

## 2024-03-14 DIAGNOSIS — R09.89 THROAT CLEARING: Primary | ICD-10-CM

## 2024-03-14 DIAGNOSIS — K21.9 LARYNGOPHARYNGEAL REFLUX (LPR): ICD-10-CM

## 2024-03-14 DIAGNOSIS — D02.0 SQUAMOUS CELL CARCINOMA IN SITU (SCCIS) OF TRUE VOCAL CORD: ICD-10-CM

## 2024-03-14 DIAGNOSIS — J30.1 SEASONAL ALLERGIC RHINITIS DUE TO POLLEN: ICD-10-CM

## 2024-03-14 PROCEDURE — 99213 OFFICE O/P EST LOW 20 MIN: CPT | Mod: 25,S$GLB,, | Performed by: OTOLARYNGOLOGY

## 2024-03-14 PROCEDURE — 31575 DIAGNOSTIC LARYNGOSCOPY: CPT | Mod: S$GLB,,, | Performed by: OTOLARYNGOLOGY

## 2024-03-14 NOTE — PROGRESS NOTES
"OTOLARYNGOLOGY CLINIC NOTE  Date:  03/14/2024     Chief complaint:  Chief Complaint   Patient presents with    Follow-up     VC cancer with flex       History of Present Illness  Rachel Silver is a 78 y.o. female  presenting today for a followup.has h/o scca in situ s/p partial cordectomy and was having hoarseness at last visit. Here today for repeat scope  No hemoptysis, no sore throat     Brothers 80 birthday this weekend and was talking a lot so hard for her to say regarding voice. Forgot to take nasal sprays ; voice was improving up until this past weekend and back to how was when she was here but definietly no worsenign    Did slp in the past for throat clearing but forgot exercises   Has been ahving some reflux lately as well has nexium otc     Still with postnasal drip and throat clearing still   Is getting chest xray upcoming as well by pcp as feels some chest congestion   Has gaviscon     I last saw the patient on 2-1-24. Started on saline, flonase and astelin that visit. Plan from last visit copied in quotations "Vocal fold that had scca in situ s/p excision - no recurrent disease noted, has  slight scar but looks stable compared to prior scope exam photos  Hoarse- recent onset but I want to do close folow up and may need dl which I discsused with her. Because it just started and she is also having allergy flare up/sinus drip and scope stable today will monitor but stressed importance about close follow up as could be something small and subtle and want to ensure scope continues to look stable and voice improves because if not I will take her to there operating room to evaluate." Below text is copied from  note on that date describing history of present illness at that time :  Has been getting hoarse off and on but has been having a lot of throat clearing too. Has only been for the past week or two and not consistent. Is getting chest xray   No throat pain   No issues with swallowing  Sometimes throat " feels dry but does not drink a lot of water  No hemoptysis  No heartburn  +congestion and postnasal drip takes zyrtec has problems doing nasal sprays  When allergies flaring up gets left earache but omes and geos and is mild     I last saw the patient on 3-27 - 23. Below text is copied from  note on that date describing history of present illness at that time :Sore throat that swaps sides has been off and on for about 6 weeks . Last seen 9-21-22.   Ear ache mostly on right side , feels it more when sinuses are really bad. She can't take flonase   Throat clearing a lot  Takes nexium now; has been having worse reflux. Nexium gives her diarrhea   No pain with swallowing  Voice is same. Has raspy voice but unchanged     She underwent DL biopsy which was concerning for SCCa in situ, could not rule out invasive scca (6-11-20) who underwent microlaryngeal excision of vocal cord lesion with CO2 laser on 7-9-20 of her left true vocal fold. CO2 laser also used to vaporize resection bed. Pathology from cord excision showed moderate dysplasia with some crush artifact and sectioning artifact but did not appear to have any residual scca in situ.     Past Medical History  Past Medical History:   Diagnosis Date    Atrial fibrillation     Cataracts, bilateral     Hoarse     Hypertension     UTI (urinary tract infection)     Vaginal delivery     x3        Past Surgical History  Past Surgical History:   Procedure Laterality Date    CYSTOSCOPY      HYSTERECTOMY      LARYNGOSCOPY N/A 06/11/2020    Procedure: LARYNGOSCOPY;  Surgeon: Yesenia Durham MD;  Location: [facility] OR;  Service: ENT;  Laterality: N/A;  RN PRE OP, COVID NEGATIVE-- 6-8-2020 [place]  HAS-Cardiac Clearance today 6-8 OM    MICROLARYNGOSCOPY N/A 07/09/2020    Procedure: MICROLARYNGOSCOPY;  Surgeon: Yesenia Durham MD;  Location: [facility] OR;  Service: ENT;  Laterality: N/A;    Microlaryngoscopy with biopsy vocal cords      TONSILLECTOMY      torn meniscus      VOCAL FOLD  LESION EXCISION  07/09/2020    Procedure: EXCISION, LESION, VOCAL CORD, USING LASER;  Surgeon: Yesenia Durham MD;  Location: [facility] OR;  Service: ENT;;        Medications  Current Outpatient Medications on File Prior to Visit   Medication Sig Dispense Refill    azelastine (ASTELIN) 137 mcg (0.1 %) nasal spray 1 spray (137 mcg total) by Nasal route 2 (two) times daily. 30 mL 3    diltiaZEM HCl (CARDIZEM LA) 240 mg 24 hr tablet Take 1 tablet by mouth once daily 90 tablet 3    flecainide (TAMBOCOR) 100 MG Tab Take 1 tablet (100 mg total) by mouth every 12 (twelve) hours. 180 tablet 3    fluticasone propionate (FLONASE) 50 mcg/actuation nasal spray 2 sprays (100 mcg total) by Each Nostril route 2 (two) times daily. 18.2 mL 3    hydroCHLOROthiazide (HYDRODIURIL) 25 MG tablet Take 1 tablet (25 mg total) by mouth once daily. 90 tablet 3    rosuvastatin (CRESTOR) 40 MG Tab TAKE 1 TABLET BY MOUTH ONCE DAILY IN THE EVENING 90 tablet 3    XARELTO 20 mg Tab Take 1 tablet by mouth once daily 30 tablet 3    estradioL (ESTRACE) 0.01 % (0.1 mg/gram) vaginal cream Place 1 g vaginally 3 (three) times a week. Place by fingertip application before bedtime three times a week (Monday, Wednesday, Friday) 45 g 3     No current facility-administered medications on file prior to visit.       Review of Systems  Review of Systems   Constitutional:  Negative for fever.   HENT:  Negative for ear pain and sore throat.    Respiratory:  Negative for hemoptysis.    Gastrointestinal:  Positive for heartburn (worse recently, diet related).   Endo/Heme/Allergies:  Positive for environmental allergies.        Social History   reports that she quit smoking about 36 years ago. Her smoking use included cigarettes. She has never used smokeless tobacco. She reports current alcohol use. She reports that she does not use drugs.     Family History  Family History   Problem Relation Age of Onset    Heart failure Mother     Stroke Father     Stroke Brother      "Heart disease Maternal Aunt     Heart disease Maternal Uncle     Heart disease Maternal Aunt     Heart disease Maternal Aunt     Heart disease Maternal Uncle         Physical Exam   There were no vitals filed for this visit. Body mass index is 27.44 kg/m².  Weight: 74.8 kg (164 lb 14.5 oz)   Height: 5' 5" (165.1 cm)     GENERAL: no acute distress.  HEAD: normocephalic.   EYES: No scleral icterus  EARS: external ear without lesion, normal pinna shape and position.    NOSE: external nose without significant bony abnormality  ORAL CAVITY/OROPHARYNX: tongue mobile.   NECK: trachea midline.   LYMPH NODES:No cervical lymphadenopathy.  RESPIRATORY: no stridor, no stertor. Voice with slight rasp, unchanged Respirations nonlabored.  NEURO: alert, responds to questions appropriately.    PSYCH:mood appropriate    PROCEDURE NOTE  NAME OF PROCEDURE: Flexible Laryngoscopy, diagnostic  INDICATIONS: gag reflex precludes mirror exam,f/u history of scca in situ, dysphonia  FINDINGS: stable exam     Consent: After procedure was explained in detail and all questions answered, verbal consent was obtained for performing flexible laryngoscopy.  Anesthesia: topical 4% lidocaine and neosynephrine  Procedure: With patient in seated position, the scope was inserted into the bilateral nasal passageway and advanced atraumatically into the nasopharynx to examine the following structures:  Nasal cavity: Turbinates with  hypertrophy. no middle meatal edema. No purulent drainage.   Nasopharynx: no mass or lesion noted in nasopharynx.   Oropharynx: base of tongue without  mass or ulceration. Lingual tonsils normal in appearance  Hypopharynx: posterior pharyngeal wall without mass or lesion. No pooling of secretions. Pyriform sinuses visible without mass or lesion  Larynx: epiglottis normal without lesion. False vocal folds without edema/erythema/lesion. True vocal folds mobile and without lesion.slight scar but stable  Mild interarytenoid edema no " erythema . Postcricoid region with mild edema no lesion   Subglottis: visualized portion of subglottis normal in appearance    After examination performed, the scope was removed atraumatically . The patient tolerated the procedure well. Photodocumentation obtained with representative images below, all images and/or videos uploaded in media section of epic.                          Imaging:  The patient does not have any new imaging of the head and neck since last visit.     Labs:  CBC  Recent Labs   Lab 11/17/23 1921 11/18/23  0647   WBC 8.31 7.73   Hemoglobin 14.7 12.5   Hematocrit 41.3 34.9 L   MCV 86 85   Platelets 267 238     BMP  Recent Labs   Lab 02/10/23  0919 11/17/23 1921 11/18/23  0647   Glucose 100 89 112 H   Sodium 139 145 143   Potassium 3.5 3.5 3.7   Chloride 103 105 107   CO2 26 28 30 H   BUN 13 16 17   Creatinine 0.8 0.9 0.9   Calcium 9.5 10.3 8.9   Phosphorus  --   --  3.5   Magnesium  --  2.1 2.3     COAGS        Assessment  1. Throat clearing    2. Dysphonia    3. 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 Laryngopharyngeal reflux (LPR)    5. Seasonal allergic rhinitis due to pollen       Plan:  Discussed plan of care with patient in detail and all questions answered. Patient reported understanding of plan of care. I gave the patient the opportunity to ask questions and patient confirmed all questions answered to satisfaction.     Reschedule with slp for throat clearing exercise refersher  Obtain saline for use prior to medication sprays- she does not have, discussed about this as well  Gaviscon liquid for throat phlegm prn    F/u 3 months if scope stable will return to q6 months follow up notify if any worsening for sooner visit       I spent a total of 20 minutes on the day of the visit ( this excludes procedure time).  This includes face to face time and non-face to face time preparing to see the patient (eg, review of tests), obtaining and/or reviewing separately  obtained history, documenting clinical information in the electronic or other health record, independently interpreting results and communicating results to the patient/family/caregiver, or care coordinator.   Please be aware that this note has been generated with the assistance of MModal voice-to-text.  Please excuse any spelling or grammatical errors.

## 2024-03-14 NOTE — PATIENT INSTRUCTIONS
Information and instructions from your visit with me today:      Always use saline every time before a medication spray. You can also use saline on its own. If you are using saline and/or the medication sprays on an as needed basis and you have symptoms use the regimen daily for at least 2 weeks. You can use the flonase and astelin together, or if you prefer to start with just one medication spray, the flonase works better by itself compared to astelin by itself. You can try doing the saline and flonase and if still congested, add on the astelin again doing this regimen daily for up to two weeks when congestion. There may be times of the year that you only need saline and there may be times of the year that you need saline, flonase and astelin to control symptoms.     Start using the following medication nasal sprays:   Fluticasone spray:    This medication is a steroid spray. It stays within the nose and does not have absorption into the body that leads to side effects that one has with oral steroid medication. Fluticasone nasal spray is the same as the Flonase brand nasal spray. Discuss with your pharmacist if the price is lower over the counter or with a prescription ( this varies depending on insurance). The medication that is over the counter is the same as the prescription medication. Use this medication as instructed on the prescription, 1-2 sprays on each side of your nose twice daily.     Azelastine  spray:  This medication is an antihistamine used to treat nasal symptoms of allergy, which works specifically in the nose unlike antihistamine pills which have more of an effect on the whole body. Use this medication as instructed on the prescription, 1 spray on each side of your nose twice daily.     Additional instructions for medication sprays  Place the tip of the medication bottle in your nose and aim slightly up and out on each side to get medication high and deep into your nose and sinuses, and not have  "it all deposit in the very front of your nose. Aim the tip of the nozzle towards the outer corner of your eye . You can imagine aiming towards the back of your eyeball on each side for this, as opposed to straight back to the center of your nose and head.     You need to use this medication every day regardless of symptoms, as it takes time ( a few weeks) to work and get the benefits. It does not work on an "as needed" basis like taking a decongestant. If your symptoms only occur in a particular season, then the medication can be used seasonally instead of year long. For seasonal symptoms, you should start using the spray twice daily a month before when you normally have symptoms ( for example, if symptoms start in August, should start at the end of June).     Start nasal irrigations with saline solution- you can either use a rinse or a mist spray:    NASAL SALINE SPRAY ( simply saline and arm and hammer are examples) There are several different brands found in the cold and flu aisle of the pharmacy. You can use any brand of saline spray - this will deliver the saline by a gentle mist ( if you have difficulty or discomfort with nasal rinse/ a lot of fluid in the nose, this will be more comfortable).       Always rinse your nose with saline prior to using medication sprays and wait a couple of hours before using again. You can use the saline throughout the day to help with stuffy nose or dry nose.    Do not use nasal decongestant sprays such as Afrin or similar products long term ( over 3 days) .  This can cause long term physical nasal addiction. Afrin should only be used if having nose bleeds, severe nasal congestion , or severe ear pain/fullness and should not be used for more than 2-3 days in a row . It is a not a medication that should be used for a long period of time.     It was nice meeting you today, and I look forward to helping you feel better soon. Please don't hesitate to call if you have any other " questions or concerns, or if I can be of any assistance in the meantime.      Yesenia Durham MD    [facility]     Phone  [phone]    Fax      [phone]        Yesenia Durham MD  Otorhinolaryngology

## 2024-05-31 RX ORDER — RIVAROXABAN 20 MG/1
20 TABLET, FILM COATED ORAL
Qty: 30 TABLET | Refills: 0 | Status: SHIPPED | OUTPATIENT
Start: 2024-05-31

## 2024-05-31 RX ORDER — HYDROCHLOROTHIAZIDE 25 MG/1
25 TABLET ORAL
Qty: 90 TABLET | Refills: 0 | Status: SHIPPED | OUTPATIENT
Start: 2024-05-31

## 2024-06-24 ENCOUNTER — PATIENT MESSAGE (OUTPATIENT)
Dept: OTOLARYNGOLOGY | Facility: CLINIC | Age: 79
End: 2024-06-24
Payer: MEDICARE

## 2024-06-28 ENCOUNTER — OFFICE VISIT (OUTPATIENT)
Dept: OTOLARYNGOLOGY | Facility: CLINIC | Age: 79
End: 2024-06-28
Payer: MEDICARE

## 2024-06-28 VITALS
DIASTOLIC BLOOD PRESSURE: 78 MMHG | SYSTOLIC BLOOD PRESSURE: 134 MMHG | BODY MASS INDEX: 27.47 KG/M2 | WEIGHT: 164.88 LBS | HEIGHT: 65 IN

## 2024-06-28 DIAGNOSIS — D02.0 SQUAMOUS CELL CARCINOMA IN SITU (SCCIS) OF TRUE VOCAL CORD: Primary | ICD-10-CM

## 2024-06-28 DIAGNOSIS — J35.1 LINGUAL TONSIL HYPERTROPHY: ICD-10-CM

## 2024-06-28 DIAGNOSIS — H61.22 IMPACTED CERUMEN OF LEFT EAR: ICD-10-CM

## 2024-06-28 DIAGNOSIS — K21.9 LARYNGOPHARYNGEAL REFLUX (LPR): ICD-10-CM

## 2024-06-28 DIAGNOSIS — J34.3 HYPERTROPHY OF INFERIOR NASAL TURBINATE: ICD-10-CM

## 2024-06-28 NOTE — PATIENT INSTRUCTIONS
Clearing your throat leads to more swelling in the voice box.  This will worsen your symptoms.  You should try to minimize throat clearing as much as possible.Get a cup of water and a straw and when you feel like you want to clear your throat, blow bubble through the straw into the water     Can try gaviscon over the counter - take 15 minutes after a meal as needed for throat phlegm    Avoid mouthwash with alcohol such as listerine. You should use biotene mouth wash    Can sleep with bedside humidifier     You should aim to drink 2 to 3 liters of water daily if you are drinking one cup of coffee.  If you have trouble drinking water, you can cut back or cut out caffeine. If you have trouble drinking water, you can cut back or cut out caffeine.    Lozenges:  Halls Breezers - sold with cough drops, Can try sugar free lozenges with pectin ,  such as halls fruit breezers NO MENTHOL       Xylimelts, on toothpaste aisle, these are convenient for when you are talking and or sleeping - they stick to gumline and provide moisture - Ambarella or [employer]        Steam and gargle - 3x day  You may consider getting a facial steamer, breathe in warm moist air  through mouth and nose to hydrate vocal folds. On amazon, I like the [employer] version that is under $25.        Gargle:   1/2 tsp each salt, baking soda, clear corn syrup, 6 oz warm water  Sip, gargle quietly, spit, repeat until gone, don't eat/drink for 30 minutes after.      Chew gum with baking soda after meals, Orbit White     Order online, when it's time to get more Gaviscon, either Alginate therapy such as Reflux Gourmet  or Gaviscon Advance can be used following meals and at bedtime for reflux coverage. The Acid Watcher Diet by Dr Null as well as the Chronic Cough White by Dr Cohen are good reads to gain improved understanding of dietary and behavioral changes that can aid in reduction of LPRD, and to better understand cough and cough control     [email or web address]

## 2024-07-01 RX ORDER — RIVAROXABAN 20 MG/1
20 TABLET, FILM COATED ORAL
Qty: 30 TABLET | Refills: 11 | Status: SHIPPED | OUTPATIENT
Start: 2024-07-01

## 2024-08-05 RX ORDER — DILTIAZEM HYDROCHLORIDE EXTENDED-RELEASE TABLETS 240 MG/1
TABLET, EXTENDED RELEASE ORAL
Qty: 90 TABLET | Refills: 0 | Status: SHIPPED | OUTPATIENT
Start: 2024-08-05

## 2024-08-30 RX ORDER — HYDROCHLOROTHIAZIDE 25 MG/1
25 TABLET ORAL
Qty: 90 TABLET | Refills: 0 | Status: SHIPPED | OUTPATIENT
Start: 2024-08-30

## 2024-08-30 RX ORDER — DILTIAZEM HYDROCHLORIDE EXTENDED-RELEASE TABLETS 240 MG/1
TABLET, EXTENDED RELEASE ORAL
Qty: 90 TABLET | Refills: 3 | Status: SHIPPED | OUTPATIENT
Start: 2024-08-30

## 2024-08-30 RX ORDER — ROSUVASTATIN CALCIUM 40 MG/1
TABLET, COATED ORAL
Qty: 90 TABLET | Refills: 3 | Status: SHIPPED | OUTPATIENT
Start: 2024-08-30

## 2024-09-04 ENCOUNTER — PATIENT MESSAGE (OUTPATIENT)
Dept: CARDIOLOGY | Facility: CLINIC | Age: 79
End: 2024-09-04
Payer: MEDICARE

## 2024-09-26 ENCOUNTER — TELEPHONE (OUTPATIENT)
Dept: CARDIOLOGY | Facility: CLINIC | Age: 79
End: 2024-09-26
Payer: MEDICARE

## 2024-09-26 NOTE — TELEPHONE ENCOUNTER
Patient called to schedule her yearly f/u and preop before her colonoscopy.  No answer.  LVM and portal message.

## 2024-09-27 DIAGNOSIS — I10 ESSENTIAL HYPERTENSION: Primary | ICD-10-CM

## 2024-09-27 DIAGNOSIS — I48.92 ATRIAL FLUTTER, UNSPECIFIED TYPE: ICD-10-CM

## 2024-09-30 RX ORDER — FLECAINIDE ACETATE 100 MG/1
100 TABLET ORAL EVERY 12 HOURS
Qty: 180 TABLET | Refills: 3 | Status: SHIPPED | OUTPATIENT
Start: 2024-09-30

## 2024-10-07 ENCOUNTER — OFFICE VISIT (OUTPATIENT)
Dept: OTOLARYNGOLOGY | Facility: CLINIC | Age: 79
End: 2024-10-07
Payer: MEDICARE

## 2024-10-07 ENCOUNTER — TELEPHONE (OUTPATIENT)
Dept: CARDIOLOGY | Facility: CLINIC | Age: 79
End: 2024-10-07
Payer: MEDICARE

## 2024-10-07 VITALS
BODY MASS INDEX: 27.71 KG/M2 | SYSTOLIC BLOOD PRESSURE: 132 MMHG | HEART RATE: 66 BPM | DIASTOLIC BLOOD PRESSURE: 75 MMHG | WEIGHT: 166.31 LBS | HEIGHT: 65 IN

## 2024-10-07 DIAGNOSIS — H61.22 IMPACTED CERUMEN OF LEFT EAR: ICD-10-CM

## 2024-10-07 DIAGNOSIS — D02.0 SQUAMOUS CELL CARCINOMA IN SITU (SCCIS) OF TRUE VOCAL CORD: Primary | ICD-10-CM

## 2024-10-07 DIAGNOSIS — R49.0 DYSPHONIA: ICD-10-CM

## 2024-10-07 DIAGNOSIS — J34.3 HYPERTROPHY OF INFERIOR NASAL TURBINATE: ICD-10-CM

## 2024-10-07 DIAGNOSIS — J30.2 SEASONAL ALLERGIC RHINITIS, UNSPECIFIED TRIGGER: ICD-10-CM

## 2024-10-07 PROCEDURE — 31575 DIAGNOSTIC LARYNGOSCOPY: CPT | Mod: S$GLB,,, | Performed by: OTOLARYNGOLOGY

## 2024-10-07 PROCEDURE — 3078F DIAST BP <80 MM HG: CPT | Mod: CPTII,S$GLB,, | Performed by: OTOLARYNGOLOGY

## 2024-10-07 PROCEDURE — 3075F SYST BP GE 130 - 139MM HG: CPT | Mod: CPTII,S$GLB,, | Performed by: OTOLARYNGOLOGY

## 2024-10-07 PROCEDURE — 3288F FALL RISK ASSESSMENT DOCD: CPT | Mod: CPTII,S$GLB,, | Performed by: OTOLARYNGOLOGY

## 2024-10-07 PROCEDURE — 69210 REMOVE IMPACTED EAR WAX UNI: CPT | Mod: 51,S$GLB,, | Performed by: OTOLARYNGOLOGY

## 2024-10-07 PROCEDURE — 99213 OFFICE O/P EST LOW 20 MIN: CPT | Mod: 25,S$GLB,, | Performed by: OTOLARYNGOLOGY

## 2024-10-07 PROCEDURE — 1101F PT FALLS ASSESS-DOCD LE1/YR: CPT | Mod: CPTII,S$GLB,, | Performed by: OTOLARYNGOLOGY

## 2024-10-07 PROCEDURE — 1126F AMNT PAIN NOTED NONE PRSNT: CPT | Mod: CPTII,S$GLB,, | Performed by: OTOLARYNGOLOGY

## 2024-10-07 NOTE — PROGRESS NOTES
OTOLARYNGOLOGY CLINIC NOTE  Date:  10/07/2024     Chief complaint:  Chief Complaint   Patient presents with    Follow-up       History of Present Illness  Rachel Silver is a 78 y.o. female  presenting today for a followup.  Has been clearing throat a lot recently but had some work done on her home and there was a lot of dust  Has postanasal drip   Gets temporary hoarseness- associated when she is clearing more  Did slp in the past for throat clearing     I last saw the patient on 6-28 - 24. Below text is copied from  note on that date describing history of present illness at that time :  Voice sounds better today compared   +throat clearing - has been worse   Had chest ct and that was clear   Has been coughing more and had some white stuff ; chest ct was clear   Has been having some reflux too   Helps when sucks on a peppermint  Voice gets raspy intermittently  brien with increased clearing but sounds better today  No issues with swallowing  No ear pain really- on occasion gets left ear pain but not often very seldom   Not coughing up any blood  Ear pain happens with nasal congestion  Started back on nexium last week has been taking in the am      I last saw the patient on 3-14-24. Below text is copied from  note on that date describing history of present illness at that time :  h/o scca in situ s/p partial cordectomy and was having hoarseness at last visit. Here today for repeat scope  No hemoptysis, no sore throat      Brothers 80 birthday this weekend and was talking a lot so hard for her to say regarding voice. Forgot to take nasal sprays ; voice was improving up until this past weekend and back to how was when she was here but definietly no worsenign     Did slp in the past for throat clearing but forgot exercises   Has been ahving some reflux lately as well has nexium otc      Still with postnasal drip and throat clearing still   Is getting chest xray upcoming as well by pcp as feels some chest congestion  "  Has gaviscon      I last saw the patient on 2-1-24. Started on saline, flonase and astelin that visit. Plan from last visit copied in quotations "Vocal fold that had scca in situ s/p excision - no recurrent disease noted, has  slight scar but looks stable compared to prior scope exam photos  Hoarse- recent onset but I want to do close folow up and may need dl which I discsused with her. Because it just started and she is also having allergy flare up/sinus drip and scope stable today will monitor but stressed importance about close follow up as could be something small and subtle and want to ensure scope continues to look stable and voice improves because if not I will take her to there operating room to evaluate." Below text is copied from  note on that date describing history of present illness at that time :  Has been getting hoarse off and on but has been having a lot of throat clearing too. Has only been for the past week or two and not consistent. Is getting chest xray   No throat pain   No issues with swallowing  Sometimes throat feels dry but does not drink a lot of water  No hemoptysis  No heartburn  +congestion and postnasal drip takes zyrtec has problems doing nasal sprays  When allergies flaring up gets left earache but omes and geos and is mild     I last saw the patient on 3-14-24. Below text is copied from  note on that date describing history of present illness at that time :Sore throat that swaps sides has been off and on for about 6 weeks . Last seen 9-21-22.   Ear ache mostly on right side , feels it more when sinuses are really bad. She can't take flonase   Throat clearing a lot  Takes nexium now; has been having worse reflux. Nexium gives her diarrhea   No pain with swallowing  Voice is same. Has raspy voice but unchanged     She underwent DL biopsy which was concerning for SCCa in situ, could not rule out invasive scca (6-11-20) who underwent microlaryngeal excision of vocal cord lesion with " CO2 laser on 7-9-20 of her left true vocal fold. CO2 laser also used to vaporize resection bed. Pathology from cord excision showed moderate dysplasia with some crush artifact and sectioning artifact but did not appear to have any residual scca in situ.        Past Medical History  Past Medical History:   Diagnosis Date    Atrial fibrillation     Cataracts, bilateral     Hoarse     Hypertension     UTI (urinary tract infection)     Vaginal delivery     x3        Past Surgical History  Past Surgical History:   Procedure Laterality Date    CYSTOSCOPY      HYSTERECTOMY      LARYNGOSCOPY N/A 06/11/2020    Procedure: LARYNGOSCOPY;  Surgeon: Yesenia Durham MD;  Location: [facility] OR;  Service: ENT;  Laterality: N/A;  RN PRE OP, COVID NEGATIVE-- 6-8-2020 CA  HAS-Cardiac Clearance today 6-8 OM    MICROLARYNGOSCOPY N/A 07/09/2020    Procedure: MICROLARYNGOSCOPY;  Surgeon: Yesenia Durham MD;  Location: [facility] OR;  Service: ENT;  Laterality: N/A;    Microlaryngoscopy with biopsy vocal cords      TONSILLECTOMY      torn meniscus      VOCAL FOLD LESION EXCISION  07/09/2020    Procedure: EXCISION, LESION, VOCAL CORD, USING LASER;  Surgeon: Yesenia Durham MD;  Location: [facility] OR;  Service: ENT;;        Medications  Current Outpatient Medications on File Prior to Visit   Medication Sig Dispense Refill    azelastine (ASTELIN) 137 mcg (0.1 %) nasal spray 1 spray (137 mcg total) by Nasal route 2 (two) times daily. 30 mL 3    diltiaZEM HCl (CARDIZEM LA) 240 mg 24 hr tablet Take 1 tablet by mouth once daily 90 tablet 3    estradioL (ESTRACE) 0.01 % (0.1 mg/gram) vaginal cream Place 1 g vaginally 3 (three) times a week. Place by fingertip application before bedtime three times a week (Monday, Wednesday, Friday) 45 g 3    flecainide (TAMBOCOR) 100 MG Tab TAKE 1 TABLET BY MOUTH EVERY 12 HOURS 180 tablet 3    fluticasone propionate (FLONASE) 50 mcg/actuation nasal spray 2 sprays (100 mcg total) by Each Nostril route 2 (two) times  daily. 18.2 mL 3    hydroCHLOROthiazide (HYDRODIURIL) 25 MG tablet Take 1 tablet by mouth once daily 90 tablet 0    rosuvastatin (CRESTOR) 40 MG Tab TAKE 1 TABLET BY MOUTH ONCE DAILY IN THE EVENING 90 tablet 3    XARELTO 20 mg Tab Take 1 tablet by mouth once daily 30 tablet 11     No current facility-administered medications on file prior to visit.       Review of Systems  ROS     Social History   reports that she quit smoking about 37 years ago. Her smoking use included cigarettes. She has never used smokeless tobacco. She reports current alcohol use. She reports that she does not use drugs.     Family History  Family History   Problem Relation Name Age of Onset    Heart failure Mother      Stroke Father      Stroke Brother      Heart disease Maternal Aunt      Heart disease Maternal Uncle      Heart disease Maternal Aunt      Heart disease Maternal Aunt      Heart disease Maternal Uncle          Physical Exam   Vitals:    10/07/24 0951   BP: 132/75   Pulse: 66    Body mass index is 27.68 kg/m².            GENERAL: no acute distress.  HEAD: normocephalic.   EYES: No scleral icterus  EARS: external ear without lesion, normal pinna shape and position.  External auditory canal with normal cerumen, tympanic membrane fully visible, no perforation , no retraction. No middle ear effusion. Ossicles intact. Left cerumen impaction   NOSE: external nose without significant bony abnormality  ORAL CAVITY/OROPHARYNX: tongue mobile.   NECK: trachea midline.   LYMPH NODES:No cervical lymphadenopathy.  RESPIRATORY: no stridor, no stertor. Voice relatively normal- slighlty raspy unchanged. Respirations nonlabored.  NEURO: alert, responds to questions appropriately.    PSYCH:mood appropriate    Procedure Note   Procedure performed:examination of ears with cerumen disimpaction    Indication for procedure: unilateral cerumen impaction     Description of procedure:  After verbal consent was obtained and with the patient in seated  position and the operating head otoscope was inserted into the left ear.  Otologic instruments including various size otologic suctions and curette were used to remove the cerumen from the right external auditory canals under visualization with the operating head otoscope. After cleaning, visualization was again performed  of the ear canal, tympanic membrane, ossicles and middle ear space. Findings as indicated below. All portions of the procedure and examination were tolerated well without complication.     Findings:  Left ear: Complete cerumen impaction removed entirely revealing normal external auditory canal; tympanic membrane without bulging, retraction, or perforation; no evidence of middle ear fluid or effusion.         PROCEDURE NOTE  NAME OF PROCEDURE: Flexible Laryngoscopy, diagnostic  INDICATIONS: gag reflex precludes mirror exam, f/u history of scca in situ  FINDINGS: slight scar of vocal fold from prior partial cordectomy - improving, no lesion concerning for cancer or precancer     Consent: After procedure was explained in detail and all questions answered, verbal consent was obtained for performing flexible laryngoscopy.  Anesthesia: topical 4% lidocaine and neosynephrine  Procedure: With patient in seated position, the scope was inserted into the bilateral nasal passageway and advanced atraumatically into the nasopharynx to examine the following structures:  Nasal cavity: Turbinates with  hypertrophy. no middle meatal edema. No purulent drainage.   Nasopharynx: no mass or lesion noted in nasopharynx.   Oropharynx: base of tongue without  mass or ulceration. Lingual tonsils normal in appearance  Hypopharynx: posterior pharyngeal wall without mass or lesion. No pooling of secretions. Pyriform sinuses visible without mass or lesion  Larynx: epiglottis normal without lesion. False vocal folds without edema/erythema/lesion. True vocal folds mobile and without lesion.slight scar of vocal fold from prior  partial cordectomy - improving, no lesion concerning for cancer or precancer Mild interarytenoid edema no erythema . Postcricoid region with mild edema no lesion   Subglottis: visualized portion of subglottis normal in appearance    After examination performed, the scope was removed atraumatically . The patient tolerated the procedure well. Photodocumentation obtained with representative images below, all images and/or videos uploaded in media section of epic.                      Imaging:  The patient does not have any new imaging of the head and neck since last visit.     Labs:  CBC  Recent Labs   Lab 11/17/23 1921 11/18/23  0647   WBC 8.31 7.73   Hemoglobin 14.7 12.5   Hematocrit 41.3 34.9 L   MCV 86 85   Platelets 267 238     BMP  Recent Labs   Lab 02/10/23  0919 11/17/23 1921 11/18/23  0647   Glucose 100 89 112 H   Sodium 139 145 143   Potassium 3.5 3.5 3.7   Chloride 103 105 107   CO2 26 28 30 H   BUN 13 16 17   Creatinine 0.8 0.9 0.9   Calcium 9.5 10.3 8.9   Phosphorus  --   --  3.5   Magnesium  --  2.1 2.3     COAGS        Assessment  1. 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 Hypertrophy of inferior nasal turbinate    3. Impacted cerumen of left ear    4. Dysphonia    5. Seasonal allergic rhinitis, unspecified trigger       Plan:  Discussed plan of care with patient in detail and all questions answered. Patient reported understanding of plan of care. I gave the patient the opportunity to ask questions and patient confirmed all questions answered to satisfaction.     No recurrent disease  Can restart nasal spray regimen  Due for hearing test- scheduled  Counseled on throat clearing, offered refresher with slp she would like to hold off for now  Debrox otc 1-2 days per month     F/u 4-6 months with flex sooner if issue    I spent a total of 20 minutes on the day of the visit ( this was excluding time for procedures which were additional time).  This includes face to face time and  non-face to face time preparing to see the patient (eg, review of tests), obtaining and/or reviewing separately obtained history, documenting clinical information in the electronic or other health record, independently interpreting results and communicating results to the patient/family/caregiver, or care coordinator.   Please be aware that this note has been generated with the assistance of [employer]berry voice-to-text.  Please excuse any spelling or grammatical errors.

## 2024-10-07 NOTE — PROGRESS NOTES
General Cardiology Clinic Note  Last Clinic Visit: 23 with Dr. Graff   General Cardiologist: Dr. Graff    HPI:     Rachel Silver is a 78 y.o. , who presents for annual/pre-op exam.    PROBLEM LIST:  HTN  HLD  pAF    Interval HPI:   She presents today for annual visit + pre-operative cardiovascular risk stratification in anticipation of an upcoming colonoscopy. She feels well today and has no cardiac complaints. She is active taking care of her grandkids. She enjoys walking at [employer] 3x/week and feels well while doing so. Denies chest pain/pressure/tightness/discomfort, LEVINE, sustained palpitations, PND/orthopnea, edema, lightheadedness or syncope, or changes in exercise capacity. Weight stable. Regarding her AF, she continues to do well on flecainide and remains in sinus rhythm. She remains on xarelto for CVA prophylaxis and denies epistaxis, hematuria, hematemesis, hematochezia/melena, or other bleeding issues. BP remains controlled. She checks at home with readings mostly 130s/70s; she knows she could do better with salt consumption. Last FLP done on 24: LDL - 41. She is on rosuvastatin 40mg daily. Kidney function is stable, last Cr 0.87. She has been noticing some blurred vision at night and would like to see an optometrist.      2023 HPI (Dr. Graff)  Patient swims and does line dancing  She is very active with grand kids  No chest pain, Orthopnea, PND of heart failure symptoms.   Denies palpitations or fluttering in the chest  No dizziness or LOC  Ex smoker 20 py  Mother had CHF, uncle  of MI at the age of 48.    2022 HPI (Dr. Turner)  Ms. Rachel Silver is a 76 y.o. woman with pAF (on flecainide and xarelto), HTN, HLD. Patient of Dr Graff. Presents today for one year follow up. She is doing well. Not exercising but active at home and with grandchildren. Denies symptoms with activity. Afib well controlled on flecainide. No issues with bleeding. Stopped taking  rosuvastatin for 2 months and her last lipid panel 4/2022 (available on [facility] Everywhere) showed LDL of 160 from 50. She is back on it and we discussed the importance of taking it.     3/2021 HPI (Dr. Graff)  Patient is very active.   Palpitations have improved since she was started on flecainide.   No chest pain, Orthopnea, PND of heart failure symptoms.   BP has been normal so far.     6/2020 HPI (Florida Craig, DAMION)  Rachel Silver is a 74 y.o. female who is seen in clinic today for cardiac pre-op evaluation. She is scheduled to undergo laryngoscopy with Dr. Durham on 06/11/2020.   Reports feeling well and is without any cardiac concerns. Started on flecainide 100 mg BID in January for Afib.  Reports tolerating medication and is without palpitations or any known recurrence of afib.  Also remains on xarelto. Denies bleeding.    Denies chest pain at rest or with exertion. Denies SOB or LEVINE.   Remains active walking and biking. She is able to achieve METS>4.   BP borderline elevated at clinic today. Routinely monitors BP at home, reports systolic pressures  120s-140s. Compliant with medications.     Admits to eating salty snacks.       Surgical: Reviewed, as below.  Family: Reviewed, as below.   Social: Reviewed, as below.    ROS:    Pertinent ROS included in HPI and below.  PMH:     Past Medical History:   Diagnosis Date    Atrial fibrillation     Cataracts, bilateral     Hoarse     Hypertension     UTI (urinary tract infection)     Vaginal delivery     x3     Past Surgical History:   Procedure Laterality Date    CYSTOSCOPY      HYSTERECTOMY      LARYNGOSCOPY N/A 06/11/2020    Procedure: LARYNGOSCOPY;  Surgeon: Yesenia Durham MD;  Location: [facility] OR;  Service: ENT;  Laterality: N/A;  RN PRE OP, COVID NEGATIVE-- 6-8-2020 [place]  HAS-Cardiac Clearance today 6-8 [facility]    MICROLARYNGOSCOPY N/A 07/09/2020    Procedure: MICROLARYNGOSCOPY;  Surgeon: Yesenia Durham MD;  Location: [facility] OR;  Service: ENT;  Laterality:  N/A;    Microlaryngoscopy with biopsy vocal cords      TONSILLECTOMY      torn meniscus      VOCAL FOLD LESION EXCISION  2020    Procedure: EXCISION, LESION, VOCAL CORD, USING LASER;  Surgeon: Yesenia Durham MD;  Location: [facility];  Service: ENT;;     Allergies:   Review of patient's allergies indicates:  No Known Allergies  Medications:     Current Outpatient Medications on File Prior to Visit   Medication Sig Dispense Refill    azelastine (ASTELIN) 137 mcg (0.1 %) nasal spray 1 spray (137 mcg total) by Nasal route 2 (two) times daily. 30 mL 3    diltiaZEM HCl (CARDIZEM LA) 240 mg 24 hr tablet Take 1 tablet by mouth once daily 90 tablet 3    flecainide (TAMBOCOR) 100 MG Tab TAKE 1 TABLET BY MOUTH EVERY 12 HOURS 180 tablet 3    fluticasone propionate (FLONASE) 50 mcg/actuation nasal spray 2 sprays (100 mcg total) by Each Nostril route 2 (two) times daily. 18.2 mL 3    hydroCHLOROthiazide (HYDRODIURIL) 25 MG tablet Take 1 tablet by mouth once daily 90 tablet 0    rosuvastatin (CRESTOR) 40 MG Tab TAKE 1 TABLET BY MOUTH ONCE DAILY IN THE EVENING 90 tablet 3    XARELTO 20 mg Tab Take 1 tablet by mouth once daily 30 tablet 11    estradioL (ESTRACE) 0.01 % (0.1 mg/gram) vaginal cream Place 1 g vaginally 3 (three) times a week. Place by fingertip application before bedtime three times a week (Monday, Wednesday, Friday) 45 g 3     No current facility-administered medications on file prior to visit.     Social History:     Social History     Tobacco Use    Smoking status: Former     Current packs/day: 0.00     Types: Cigarettes     Quit date: 10/9/1987     Years since quittin.0    Smokeless tobacco: Never   Substance Use Topics    Alcohol use: Yes     Comment: social     Family History:     Family History   Problem Relation Name Age of Onset    Heart failure Mother      Stroke Father      Stroke Brother      Heart disease Maternal Aunt      Heart disease Maternal Uncle      Heart disease Maternal Aunt       "Heart disease Maternal Aunt      Heart disease Maternal Uncle       Physical Exam:   BP (!) 141/78 (Patient Position: Sitting)   Pulse 60   Ht 5' 5" (1.651 m)   Wt 76 kg (167 lb 8.8 oz)   SpO2 98%   BMI 27.88 kg/m²      Physical Exam  Constitutional:       Appearance: Normal appearance.   Neck:      Vascular: No carotid bruit.   Cardiovascular:      Rate and Rhythm: Normal rate and regular rhythm.      Pulses:           Carotid pulses are 2+ on the right side and 2+ on the left side.       Radial pulses are 2+ on the right side and 2+ on the left side.        Dorsalis pedis pulses are 2+ on the right side and 2+ on the left side.        Posterior tibial pulses are 2+ on the right side and 2+ on the left side.      Heart sounds: Normal heart sounds.   Pulmonary:      Effort: Pulmonary effort is normal.      Breath sounds: Normal breath sounds.   Musculoskeletal:      Right lower leg: No edema.      Left lower leg: No edema.   Skin:     General: Skin is warm and dry.   Neurological:      Mental Status: She is alert.          Labs:     Blood Tests:  Lab Results   Component Value Date     11/18/2023    K 3.7 11/18/2023     11/18/2023    CO2 30 (H) 11/18/2023    BUN 17 11/18/2023    CREATININE 0.9 11/18/2023     (H) 11/18/2023    MG 2.3 11/18/2023    AST 13 11/18/2023    ALT 8 (L) 11/18/2023    ALBUMIN 3.7 11/18/2023    PROT 6.2 11/18/2023    BILITOT 0.4 11/18/2023    WBC 7.73 11/18/2023    HGB 12.5 11/18/2023    HCT 34.9 (L) 11/18/2023    MCV 85 11/18/2023     11/18/2023    TSH 1.62 04/26/2024    TSH 1.531 11/17/2023       Lab Results   Component Value Date    CHOL 111 04/26/2024    CHOL 136 09/06/2019    HDL 55 04/26/2024    HDL 60 09/06/2019    TRIG 76 04/26/2024    TRIG 49 09/06/2019       Lab Results   Component Value Date    LDLCALC 41 04/26/2024       Lab Results   Component Value Date    TSH 1.62 04/26/2024       No results found for: "HGBA1C"      Imaging:     Echocardiogram  TTE " 7/2023  The left ventricle is normal in size with normal systolic function.  The estimated ejection fraction is 63%.  Indeterminate left ventricular diastolic function.  Normal right ventricular size with normal right ventricular systolic function.  Moderate left atrial enlargement.  Mild right atrial enlargement.  Mild tricuspid regurgitation.  Normal central venous pressure (3 mmHg).  The estimated PA systolic pressure is 28 mmHg.    TTE 4/2022  The left ventricle is normal in size with normal systolic function.  The estimated ejection fraction is 60%.  Normal left ventricular diastolic function.  Normal right ventricular size with normal right ventricular systolic function.  The estimated PA systolic pressure is 24 mmHg.  Normal central venous pressure (3 mmHg).    TTE 12/2019  Normal left ventricular systolic function. The estimated ejection fraction is 60%  Indeterminate left ventricular diastolic function.  No wall motion abnormalities.  Normal right ventricular systolic function.  Mild left atrial enlargement.  The estimated PA systolic pressure is 22 mm Hg  Normal central venous pressure (3 mm Hg).    Stress testing  PET Stress 8/2019    The relative PET images are normal showing no clinically significant regional resting or stress induced perfusion defects.    Whole heart absolute myocardial perfusion (cc/min/g) averaged 1.17  at rest (which is elevated), 1.66 cc/min/g at stress (which is mildly reduced), and 1.81 cc/min/g CFR (which is mildly reduced in part due to elevated resting flow).    Visually estimated ejection fraction is normal at rest and normal at stress.    Wall motion was normal at rest and during stress.    LV cavity size is normal at rest and stress.    The EKG portion of this study is negative for ischemia.    Arrhythmias during stress: frequent PVCs.    The patient reported no chest pain during the stress test.    There are no prior studies for comparison.     Cath  Lab  None    Other  Carotid US 7/2023  There is less than 50% right Internal Carotid Stenosis.  There is less than 50% left Internal Carotid Stenosis.  There is antegrade flow in the vertebral arteries bilaterally.    48-hour Holter 7/2019  Predominant Rhythm: Sinus rhythm with heart rates varying between 51 and 174 bpm with an average of 76 bpm.   There were 3 paroxysms of atrial fibrillation with rapid ventricular conduction with intermittent organization into atrial flutter with a max ventricular rate of 174 bpm. Total amount of time in atrial fibrillation was 6.3 minutes.   There were frequent PVCs totalling 2613 and averaging 54.44 per hour. The ventricular arrhythmias percentage was 1.2.   There were occasional PACs totalling 986 and averaging 20.54 per hour.     EKG:   10/9/24 - Sinus bradycardia, 55 bpm   ST and T wave abnormality, consider anterior ischemia   Abnormal ECG   When compared with ECG of 20-FEB-2024 10:16,   No significant change was found    Assessment:     1. Essential hypertension    2. Pure hypercholesterolemia    3. PAF (paroxysmal atrial fibrillation)    4. Pre-operative cardiovascular examination        Plan:     Essential hypertension  BP well-controlled. Continue current medication regimen. Encourage low-sodium diet.    Pure hypercholesterolemia  LDL at goal. Continue statin therapy. Cholesterol education provided.    PAF (paroxysmal atrial fibrillation)  Stable. EKG today SR.  Follows with EP. Continue current medication regimen. Discussed injury precautions while on OAC.     Pre-operative cardiovascular examination    Revised Cardiac Risk Index for Pre-Operative Risk Yes +1 No 0   1. High-risk surgery: Intraperitoneal; intrathoracic; suprainguinal vascular    0   2. History of ischemic heart disease:  History of myocardial infarction (MI); history of positive exercise test; current chest paint considered due to myocardial ischemia; use of nitrate therapy or ECG with pathological Q  waves    0   3. History of congestive heart failure:  Pulmonary edema, bilateral rales or S3 gallop; paroxysmal nocturnal dyspnea; chest x-ray (CXR) showing pulmonary vascular redistribution    0   4. History of cerebrovascular disease: Prior transient ischemic attack (TIA) or stroke    0   5.  Pre-operative treatment with insulin  0   6.  Creatinine >2  0     Total Risk for roman-operative major cardiac event, 3.9%  RCRI score of 0 is consistent with 3.9% roman-operative risk of major adverse cardiac event (cardiac death, nonfatal MI, nonfatal cardiac arrest).   Pt has no active cardiac condition (ACS/USA, decompenstated CHF, significant arrhythmias or severe valvular disease) and can easily achieve 4 METS.  Pt does not require further cardiac evaluation prior to undergoing a colonoscopy.  Pt should remain on beta-blockers throughout the entire roman-procedure time period.  Xarelto can held for 48 hours prior to procedure, but should be restarted as soon as safely possible. The remaining cardiac meds can be held as needed but should also be restarted after the procedure. These recommendations follow the most current Guideline on Perioperative Cardiovascular Evaluation and Management of Patients Undergoing Noncardiac Surgery released by the ACC/AHA. (JACC 2014.07.944).    No further medication adjustment or cardiac testing will reduce surgical risk prior to proceeding with planned surgery.      Signed:  Lauren Vlosich, PA-C [facility]     10/9/2024     Follow-up:     Future Appointments   Date Time Provider Department Center   10/21/2024  9:30 AM Florida Rivera AU.D [facility]   4/7/2025  9:45 AM Yesenia Durham MD [facility]

## 2024-10-09 ENCOUNTER — TELEPHONE (OUTPATIENT)
Dept: CARDIOLOGY | Facility: CLINIC | Age: 79
End: 2024-10-09

## 2024-10-09 ENCOUNTER — OFFICE VISIT (OUTPATIENT)
Dept: CARDIOLOGY | Facility: CLINIC | Age: 79
End: 2024-10-09
Payer: MEDICARE

## 2024-10-09 VITALS
WEIGHT: 167.56 LBS | OXYGEN SATURATION: 98 % | DIASTOLIC BLOOD PRESSURE: 78 MMHG | SYSTOLIC BLOOD PRESSURE: 141 MMHG | BODY MASS INDEX: 27.92 KG/M2 | HEIGHT: 65 IN | HEART RATE: 60 BPM

## 2024-10-09 DIAGNOSIS — E78.00 PURE HYPERCHOLESTEROLEMIA: ICD-10-CM

## 2024-10-09 DIAGNOSIS — I10 ESSENTIAL HYPERTENSION: Primary | ICD-10-CM

## 2024-10-09 DIAGNOSIS — I48.0 PAF (PAROXYSMAL ATRIAL FIBRILLATION): ICD-10-CM

## 2024-10-09 DIAGNOSIS — I48.92 ATRIAL FLUTTER, UNSPECIFIED TYPE: ICD-10-CM

## 2024-10-09 DIAGNOSIS — Z01.810 PRE-OPERATIVE CARDIOVASCULAR EXAMINATION: ICD-10-CM

## 2024-10-09 LAB
OHS QRS DURATION: 96 MS
OHS QTC CALCULATION: 405 MS

## 2024-10-09 PROCEDURE — 1101F PT FALLS ASSESS-DOCD LE1/YR: CPT | Mod: CPTII,S$GLB,,

## 2024-10-09 PROCEDURE — 3077F SYST BP >= 140 MM HG: CPT | Mod: CPTII,S$GLB,,

## 2024-10-09 PROCEDURE — 99999 PR PBB SHADOW E&M-EST. PATIENT-LVL III: CPT | Mod: PBBFAC,,,

## 2024-10-09 PROCEDURE — 1126F AMNT PAIN NOTED NONE PRSNT: CPT | Mod: CPTII,S$GLB,,

## 2024-10-09 PROCEDURE — 3288F FALL RISK ASSESSMENT DOCD: CPT | Mod: CPTII,S$GLB,,

## 2024-10-09 PROCEDURE — 99214 OFFICE O/P EST MOD 30 MIN: CPT | Mod: 25,S$GLB,,

## 2024-10-09 PROCEDURE — 1159F MED LIST DOCD IN RCRD: CPT | Mod: CPTII,S$GLB,,

## 2024-10-09 PROCEDURE — 3078F DIAST BP <80 MM HG: CPT | Mod: CPTII,S$GLB,,

## 2024-10-09 PROCEDURE — 93000 ELECTROCARDIOGRAM COMPLETE: CPT | Mod: S$GLB,,, | Performed by: INTERNAL MEDICINE

## 2024-11-19 ENCOUNTER — PATIENT MESSAGE (OUTPATIENT)
Dept: CARDIOLOGY | Facility: CLINIC | Age: 79
End: 2024-11-19
Payer: MEDICARE

## 2024-11-19 ENCOUNTER — OFFICE VISIT (OUTPATIENT)
Dept: OPTOMETRY | Facility: CLINIC | Age: 79
End: 2024-11-19
Payer: MEDICARE

## 2024-11-19 DIAGNOSIS — Z96.1 PSEUDOPHAKIA OF BOTH EYES: Primary | ICD-10-CM

## 2024-11-19 DIAGNOSIS — Z01.810 PRE-OPERATIVE CARDIOVASCULAR EXAMINATION: ICD-10-CM

## 2024-11-19 DIAGNOSIS — H26.492 PCO (POSTERIOR CAPSULAR OPACIFICATION), LEFT: ICD-10-CM

## 2024-11-19 DIAGNOSIS — H04.123 DRY EYE SYNDROME OF BOTH EYES: ICD-10-CM

## 2024-11-19 PROCEDURE — 99999 PR PBB SHADOW E&M-EST. PATIENT-LVL III: CPT | Mod: PBBFAC,,, | Performed by: OPTOMETRIST

## 2024-11-19 NOTE — PROGRESS NOTES
HPI    CC: Routine eye exam; poor nighttime vision.    TOBIAS: about 3-5 yrs ago with Dr. Argueta    (+) Changes in vision   (-) Pain  (-) Irritation   (-) Itching   (-) Flashes  (-) Floaters  (-) Glasses wearer  (-) CL wearer  (-) Uses eye gtts    Does patient want a refraction today? no    (-) Eye injury  (+) Eye surgery PCIOL OU   (-)POHx  (+)FOHx AMD in siblings    (-)DM          Last edited by Florida Del Rosario, OD on 11/19/2024  8:59 AM.            Assessment /Plan     For exam results, see Encounter Report.    Pseudophakia of both eyes    PCO (posterior capsular opacification), left    Dry eye syndrome of both eyes    Pre-operative cardiovascular examination  -     Ambulatory referral/consult to Optometry      1-2. PCIOL centered OU. PCO OS. Refer for YAG.    3. Educated pt on findings. Recommended ATs TID-QID for added lubrication and comfort. Monitor.         RTC for YAG as directed, me 1 year for annual eye exam or sooner if needed.

## 2024-12-02 RX ORDER — HYDROCHLOROTHIAZIDE 25 MG/1
25 TABLET ORAL DAILY
Qty: 90 TABLET | Refills: 3 | Status: SHIPPED | OUTPATIENT
Start: 2024-12-02

## 2025-01-06 ENCOUNTER — OFFICE VISIT (OUTPATIENT)
Dept: OPHTHALMOLOGY | Facility: CLINIC | Age: 80
End: 2025-01-06
Payer: MEDICARE

## 2025-01-06 DIAGNOSIS — H26.492 POSTERIOR CAPSULAR OPACIFICATION VISUALLY SIGNIFICANT, LEFT EYE: Primary | ICD-10-CM

## 2025-01-06 PROCEDURE — 99204 OFFICE O/P NEW MOD 45 MIN: CPT | Mod: 57,S$GLB,, | Performed by: OPHTHALMOLOGY

## 2025-01-06 PROCEDURE — 3288F FALL RISK ASSESSMENT DOCD: CPT | Mod: CPTII,S$GLB,, | Performed by: OPHTHALMOLOGY

## 2025-01-06 PROCEDURE — 66821 AFTER CATARACT LASER SURGERY: CPT | Mod: LT,S$GLB,, | Performed by: OPHTHALMOLOGY

## 2025-01-06 PROCEDURE — 1126F AMNT PAIN NOTED NONE PRSNT: CPT | Mod: CPTII,S$GLB,, | Performed by: OPHTHALMOLOGY

## 2025-01-06 PROCEDURE — 1101F PT FALLS ASSESS-DOCD LE1/YR: CPT | Mod: CPTII,S$GLB,, | Performed by: OPHTHALMOLOGY

## 2025-01-06 PROCEDURE — 99999 PR PBB SHADOW E&M-EST. PATIENT-LVL I: CPT | Mod: PBBFAC,,, | Performed by: OPHTHALMOLOGY

## 2025-01-08 NOTE — PROGRESS NOTES
Assessment /Plan     For exam results, see Encounter Report.    Posterior capsular opacification visually significant, left eye  -     Yag Capsulotomy - OS - Left Eye      Visually significant posterior capsular opacity present.  os  - discussed risks, benefits, and alternatives to laser surgery - pt wishes to proceed with yag laser  - Informed consent obtained and correct eye(s) verified with patient.  - Intraocular Pressure to be taken post procedure.   - PF QID x 4 days then d/c  - f/up as scheduled    DIAGNOSIS: Visually significant posterior capsular opacity    PROCEDURE: YAG Laser Capsulotomy os    COMPLICATIONS: none     DESCRIPTION OF PROCEDURE IN DETAIL:  1 drop of topical Proparacaine and Iopidine instilled, and eye(s) dilated with 1% Tropicamide 2.5% Phenylephrine. YAG laser applied to posterior capsule in cruciate pattern.      DISPOSITION:  Patient tolerated procedure well.      Patient to call or message us if there are any concerns following the procedure.    Increase in floaters expected for the first few weeks after the procedure, and I anticipate these to subside.    F/up optom REE

## 2025-03-12 ENCOUNTER — TELEPHONE (OUTPATIENT)
Dept: CARDIOLOGY | Facility: CLINIC | Age: 80
End: 2025-03-12
Payer: MEDICARE

## 2025-03-12 NOTE — TELEPHONE ENCOUNTER
Pt scheduled in March and May w. different providers. She chose to keep the appt in Am w. PA and cancel the May appt.

## 2025-04-01 ENCOUNTER — OFFICE VISIT (OUTPATIENT)
Dept: OPHTHALMOLOGY | Facility: CLINIC | Age: 80
End: 2025-04-01
Payer: MEDICARE

## 2025-04-01 ENCOUNTER — CLINICAL SUPPORT (OUTPATIENT)
Dept: OPHTHALMOLOGY | Facility: CLINIC | Age: 80
End: 2025-04-01
Payer: MEDICARE

## 2025-04-01 DIAGNOSIS — H35.033 HYPERTENSIVE RETINOPATHY, BILATERAL: ICD-10-CM

## 2025-04-01 DIAGNOSIS — H43.822 VITREOMACULAR ADHESION, LEFT: ICD-10-CM

## 2025-04-01 DIAGNOSIS — H35.3211 EXUDATIVE AGE-RELATED MACULAR DEGENERATION, RIGHT EYE, WITH ACTIVE CHOROIDAL NEOVASCULARIZATION: Primary | ICD-10-CM

## 2025-04-01 DIAGNOSIS — H43.811 POSTERIOR VITREOUS DETACHMENT, RIGHT: ICD-10-CM

## 2025-04-01 DIAGNOSIS — H35.3133 NONEXUDATIVE AGE-RELATED MACULAR DEGENERATION, BILATERAL, ADVANCED ATROPHIC WITHOUT SUBFOVEAL INVOLVEMENT: ICD-10-CM

## 2025-04-01 DIAGNOSIS — H26.492 POSTERIOR CAPSULAR OPACIFICATION VISUALLY SIGNIFICANT, LEFT EYE: ICD-10-CM

## 2025-04-01 PROCEDURE — 1126F AMNT PAIN NOTED NONE PRSNT: CPT | Mod: CPTII,S$GLB,, | Performed by: OPHTHALMOLOGY

## 2025-04-01 PROCEDURE — 92134 CPTRZ OPH DX IMG PST SGM RTA: CPT | Mod: S$GLB,,, | Performed by: OPHTHALMOLOGY

## 2025-04-01 PROCEDURE — 67028 INJECTION EYE DRUG: CPT | Mod: 79,RT,S$GLB, | Performed by: OPHTHALMOLOGY

## 2025-04-01 PROCEDURE — 1101F PT FALLS ASSESS-DOCD LE1/YR: CPT | Mod: CPTII,S$GLB,, | Performed by: OPHTHALMOLOGY

## 2025-04-01 PROCEDURE — 99999 PR PBB SHADOW E&M-EST. PATIENT-LVL III: CPT | Mod: PBBFAC,,, | Performed by: OPHTHALMOLOGY

## 2025-04-01 PROCEDURE — 3288F FALL RISK ASSESSMENT DOCD: CPT | Mod: CPTII,S$GLB,, | Performed by: OPHTHALMOLOGY

## 2025-04-01 PROCEDURE — 92014 COMPRE OPH EXAM EST PT 1/>: CPT | Mod: 24,25,S$GLB, | Performed by: OPHTHALMOLOGY

## 2025-04-01 PROCEDURE — 1160F RVW MEDS BY RX/DR IN RCRD: CPT | Mod: CPTII,S$GLB,, | Performed by: OPHTHALMOLOGY

## 2025-04-01 PROCEDURE — 1159F MED LIST DOCD IN RCRD: CPT | Mod: CPTII,S$GLB,, | Performed by: OPHTHALMOLOGY

## 2025-04-01 RX ORDER — LEVOCETIRIZINE DIHYDROCHLORIDE 5 MG/1
1 TABLET, FILM COATED ORAL NIGHTLY
COMMUNITY
Start: 2024-12-18 | End: 2025-12-18

## 2025-04-01 RX ORDER — ERGOCALCIFEROL 1.25 MG/1
1 CAPSULE ORAL
COMMUNITY
Start: 2024-04-26

## 2025-04-01 RX ORDER — DOXYCYCLINE 100 MG/1
100 CAPSULE ORAL 2 TIMES DAILY
COMMUNITY
Start: 2024-12-18

## 2025-04-01 RX ORDER — LORATADINE 10 MG/1
10 TABLET ORAL DAILY
COMMUNITY

## 2025-04-01 RX ORDER — SOD SULF/POT CHLORIDE/MAG SULF 1.479 G
TABLET ORAL
COMMUNITY
Start: 2024-11-07

## 2025-04-01 RX ADMIN — Medication 1.25 MG: at 11:04

## 2025-04-01 NOTE — PROGRESS NOTES
HPI    Patient here today with c/o seeing distorted lines in VA OD, no pain ou   and denies floaters or flashes.    No gtts  Last edited by Yesenia Latham on 4/1/2025  9:39 AM.        OCT - OD drusen, GA, and paracentral CNVM  OS drusen and GA    FAF - GA noted OU      A/P    Wet AMD OD    Avastin OD today    2. 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 PCIOL OU  S/p YAG OU will [place]    4. PVD OD  VMA OS    5. HTN Ret OU  BP control    6. AFib  On Xarelto        1 month OCT/FAF no dilate and Avastin OD      Risks, benefits, and alternatives to treatment discussed in detail with the patient.  The patient voiced understanding and wished to proceed with the procedure    Injection Procedure Note:  Diagnosis: Wet AMD OD    Patient Identified and Time Out complete  Pt marked  Topical Proparacaine and Betadine.  Inject Avastin OD at 6:00 @ 3.5-4mm posterior to limbus  Post Operative Dx: Same  Complications: None  Follow up as above.

## 2025-04-07 ENCOUNTER — OFFICE VISIT (OUTPATIENT)
Dept: OTOLARYNGOLOGY | Facility: CLINIC | Age: 80
End: 2025-04-07
Payer: MEDICARE

## 2025-04-07 VITALS
SYSTOLIC BLOOD PRESSURE: 142 MMHG | DIASTOLIC BLOOD PRESSURE: 72 MMHG | HEIGHT: 65 IN | BODY MASS INDEX: 26.93 KG/M2 | WEIGHT: 161.63 LBS

## 2025-04-07 DIAGNOSIS — J34.3 HYPERTROPHY OF INFERIOR NASAL TURBINATE: ICD-10-CM

## 2025-04-07 DIAGNOSIS — R04.0 EPISTAXIS: ICD-10-CM

## 2025-04-07 DIAGNOSIS — R09.89 THROAT CLEARING: ICD-10-CM

## 2025-04-07 DIAGNOSIS — D02.0 SQUAMOUS CELL CARCINOMA IN SITU (SCCIS) OF TRUE VOCAL CORD: Primary | ICD-10-CM

## 2025-04-07 DIAGNOSIS — J30.2 SEASONAL ALLERGIC RHINITIS, UNSPECIFIED TRIGGER: ICD-10-CM

## 2025-04-07 PROCEDURE — 99213 OFFICE O/P EST LOW 20 MIN: CPT | Mod: 25,S$GLB,, | Performed by: OTOLARYNGOLOGY

## 2025-04-07 PROCEDURE — 1159F MED LIST DOCD IN RCRD: CPT | Mod: CPTII,S$GLB,, | Performed by: OTOLARYNGOLOGY

## 2025-04-07 PROCEDURE — 3078F DIAST BP <80 MM HG: CPT | Mod: CPTII,S$GLB,, | Performed by: OTOLARYNGOLOGY

## 2025-04-07 PROCEDURE — 3288F FALL RISK ASSESSMENT DOCD: CPT | Mod: CPTII,S$GLB,, | Performed by: OTOLARYNGOLOGY

## 2025-04-07 PROCEDURE — 31575 DIAGNOSTIC LARYNGOSCOPY: CPT | Mod: S$GLB,,, | Performed by: OTOLARYNGOLOGY

## 2025-04-07 PROCEDURE — 3077F SYST BP >= 140 MM HG: CPT | Mod: CPTII,S$GLB,, | Performed by: OTOLARYNGOLOGY

## 2025-04-07 PROCEDURE — 1126F AMNT PAIN NOTED NONE PRSNT: CPT | Mod: CPTII,S$GLB,, | Performed by: OTOLARYNGOLOGY

## 2025-04-07 PROCEDURE — 1101F PT FALLS ASSESS-DOCD LE1/YR: CPT | Mod: CPTII,S$GLB,, | Performed by: OTOLARYNGOLOGY

## 2025-04-07 NOTE — PROGRESS NOTES
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 female  presenting today for a followup.    Underwent co2 laser excision of scca in situ 7-9-2020   Takes xyzal in evening which has been helping with throat clearing and the postnasal drip  Clearing has gotten much better  She does not like nasal sprays   She had been working outside and a couple of weeks ago had some runny nose and congestion and headache but resolved after a few days  No issues with speech or swallowing   No hemoptysis    Had bleed from right lydia ea couple weeks ago stopped on own but was larger bleed     I last saw the patient on 10-7 - 24. Below text is copied from  note on that date describing history of present illness at that time :  Has been clearing throat a lot recently but had some work done on her home and there was a lot of dust  Has postanasal drip   Gets temporary hoarseness- associated when she is clearing more  Did slp in the past for throat clearing      I last saw the patient on 6-28 - 24. Below text is copied from  note on that date describing history of present illness at that time :  Voice sounds better today compared   +throat clearing - has been worse   Had chest ct and that was clear   Has been coughing more and had some white stuff ; chest ct was clear   Has been having some reflux too   Helps when sucks on a peppermint  Voice gets raspy intermittently  brien with increased clearing but sounds better today  No issues with swallowing  No ear pain really- on occasion gets left ear pain but not often very seldom   Not coughing up any blood  Ear pain happens with nasal congestion  Started back on nexium last week has been taking in the am      I last saw the patient on 3-14-24. Below text is copied from  note on that date  "describing history of present illness at that time :  h/o scca in situ s/p partial cordectomy and was having hoarseness at last visit. Here today for repeat scope  No hemoptysis, no sore throat      Brothers 80 birthday this weekend and was talking a lot so hard for her to say regarding voice. Forgot to take nasal sprays ; voice was improving up until this past weekend and back to how was when she was here but definietly no worsenign     Did slp in the past for throat clearing but forgot exercises   Has been ahving some reflux lately as well has nexium otc      Still with postnasal drip and throat clearing still   Is getting chest xray upcoming as well by pcp as feels some chest congestion   Has gaviscon      I last saw the patient on 2-1-24. Started on saline, flonase and astelin that visit. Plan from last visit copied in quotations "Vocal fold that had scca in situ s/p excision - no recurrent disease noted, has  slight scar but looks stable compared to prior scope exam photos  Hoarse- recent onset but I want to do close folow up and may need dl which I discsused with her. Because it just started and she is also having allergy flare up/sinus drip and scope stable today will monitor but stressed importance about close follow up as could be something small and subtle and want to ensure scope continues to look stable and voice improves because if not I will take her to there operating room to evaluate." Below text is copied from  note on that date describing history of present illness at that time :  Has been getting hoarse off and on but has been having a lot of throat clearing too. Has only been for the past week or two and not consistent. Is getting chest xray   No throat pain   No issues with swallowing  Sometimes throat feels dry but does not drink a lot of water  No hemoptysis  No heartburn  +congestion and postnasal drip takes zyrtec has problems doing nasal sprays  When allergies flaring up gets left earache " but omes and geos and is mild     I last saw the patient on 3-14-24. Below text is copied from  note on that date describing history of present illness at that time :Sore throat that swaps sides has been off and on for about 6 weeks . Last seen 9-21-22.   Ear ache mostly on right side , feels it more when sinuses are really bad. She can't take flonase   Throat clearing a lot  Takes nexium now; has been having worse reflux. Nexium gives her diarrhea   No pain with swallowing  Voice is same. Has raspy voice but unchanged     She underwent DL biopsy which was concerning for SCCa in situ, could not rule out invasive scca (6-11-20) who underwent microlaryngeal excision of vocal cord lesion with CO2 laser on 7-9-20 of her left true vocal fold. CO2 laser also used to vaporize resection bed. Pathology from cord excision showed moderate dysplasia with some crush artifact and sectioning artifact but did not appear to have any residual scca in situ.        Past Medical History  Past Medical History:   Diagnosis Date    Atrial fibrillation     Cataracts, bilateral     Hoarse     Hypertension     UTI (urinary tract infection)     Vaginal delivery     x3        Past Surgical History  Past Surgical History:   Procedure Laterality Date    CYSTOSCOPY      HYSTERECTOMY      LARYNGOSCOPY N/A 06/11/2020    Procedure: LARYNGOSCOPY;  Surgeon: Yesenia Durham MD;  Location: [facility] OR;  Service: ENT;  Laterality: N/A;  RN PRE OP, COVID NEGATIVE-- 6-8-2020 [place]  [place]Card[place] [facility] [place]CROLARYNGOSCOPY N/A 07/09/2020    Procedure: MICROLARYNGOSCOPY;  Surgeon: Yesenia Durham MD;  Location: [facility] OR;  Service: ENT;  Laterality: N/A;    Microlaryngoscopy with biopsy vocal cords      TONSILLECTOMY      torn meniscus      VOCAL FOLD LESION EXCISION  07/09/2020    Procedure: EXCISION, LESION, VOCAL CORD, USING LASER;  Surgeon: Yesenia Durham MD;  Location: [facility] OR;  Service: ENT;;        Medications  Medications Ordered  Prior to Encounter[1]    Review of Systems  Review of Systems   Constitutional: Negative.    HENT: Negative.     Eyes: Negative.    Respiratory: Negative.     Cardiovascular: Negative.    Gastrointestinal: Negative.    Genitourinary: Negative.    Musculoskeletal: Negative.    Skin: Negative.    Neurological: Negative.    Psychiatric/Behavioral: Negative.          Social History   reports that she quit smoking about 37 years ago. Her smoking use included cigarettes. She has never used smokeless tobacco. She reports current alcohol use. She reports that she does not use drugs.     Family History  Family History   Problem Relation Name Age of Onset    Heart failure Mother      Stroke Father      Macular degeneration Sister      Macular degeneration Sister      Stroke Brother      Macular degeneration Brother      Heart disease Maternal Aunt      Heart disease Maternal Aunt      Heart disease Maternal Aunt      Heart disease Maternal Uncle      Heart disease Maternal Uncle          Physical Exam   Vitals:    04/07/25 0943   BP: (!) 142/72    Body mass index is 26.89 kg/m².            GENERAL: no acute distress.  HEAD: normocephalic.   EYES: No scleral icterus  EARS: external ear without lesion, normal pinna shape and position.    NOSE: external nose without significant bony abnormality  ORAL CAVITY/OROPHARYNX: tongue mobile.   NECK: trachea midline.   LYMPH NODES:No cervical lymphadenopathy.  RESPIRATORY: no stridor, no stertor. Voice with slight rasp unchanged Respirations nonlabored.  NEURO: alert, responds to questions appropriately.    PSYCH:mood appropriate    PROCEDURE NOTE  NAME OF PROCEDURE: Flexible Laryngoscopy, diagnostic  INDICATIONS: gag reflex precludes mirror exam, history of scca insitu   FINDINGS: no recurrent or new lesions of vocal cords.  very slight scar of left vocal fold from prior surgery no ulcers or nodules     Consent: After procedure was explained in detail and all questions answered, verbal  consent was obtained for performing flexible laryngoscopy.  Anesthesia: topical 4% lidocaine and neosynephrine  Procedure: With patient in seated position, the scope was inserted into the bilateral nasal passageway and advanced atraumatically into the nasopharynx to examine the following structures:  Nasal cavity: Turbinates with moderate-severe hypertrophy. no middle meatal edema. No purulent drainage.   Nasopharynx: no mass or lesion noted in nasopharynx.   Oropharynx: base of tongue without  mass or ulceration. Lingual tonsils normal in appearance  Hypopharynx: posterior pharyngeal wall without mass or lesion. No pooling of secretions. Pyriform sinuses visible without mass or lesion  Larynx: epiglottis normal without lesion. False vocal folds without edema/erythema/lesion. True vocal folds mobile and without lesion slight scar of vocal fold from prior partial cordectomy-unchanged. Mild interarytenoid edema no erythema . Postcricoid region with mild edema no lesion   Subglottis: visualized portion of subglottis normal in appearance    After examination performed, the scope was removed atraumatically . The patient tolerated the procedure well. Photodocumentation obtained with representative images below, all images and/or videos uploaded in media section of epic.                          Imaging:  The patient does not have any new imaging of the head and neck since last visit.     Labs:  CBC  Recent Labs   Lab 11/17/23 1921 11/18/23  0647   WBC 8.31 7.73   Hemoglobin 14.7 12.5   Hematocrit 41.3 34.9 L   MCV 86 85   Platelets 267 238     BMP  Recent Labs   Lab 02/10/23  0919 11/17/23  1921 11/18/23  0647 04/26/24  1102 10/30/24  1124   Glucose 100 89 112 H  --   --    Sodium 139 145 143 143 141   Potassium 3.5 3.5 3.7 3.6 3.6   Chloride 103 105 107  --   --    CO2 26 28 30 H  --   --    Carbon Dioxide  --   --   --  31 32   BUN 13 16 17 12.1 24.6   Creatinine 0.8 0.9 0.9 0.87 0.91   Calcium 9.5 10.3 8.9 9.6 9.4    Phosphorus  --   --  3.5  --   --    Magnesium  --  2.1 2.3  --   --      COAGS        Assessment  1. 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 Hypertrophy of inferior nasal turbinate    3. Seasonal allergic rhinitis, unspecified trigger    4. Throat clearing    5. Epistaxis       Plan:  Discussed plan of care with patient in detail and all questions answered. Patient reported understanding of plan of care. I gave the patient the opportunity to ask questions and patient confirmed all questions answered to satisfaction.     No recurrence   Will do one more 6 month and then after that can be yearly   Notify me if bleed again- counseled about how to manage nosebleed  Saline recommended, restart nasal sprays if congestion worsens  Can do vaseline in nose for moisture also     F/u as above             [1]   Current Outpatient Medications on File Prior to Visit   Medication Sig Dispense Refill    azelastine (ASTELIN) 137 mcg (0.1 %) nasal spray 1 spray (137 mcg total) by Nasal route 2 (two) times daily. 30 mL 3    diltiaZEM HCl (CARDIZEM LA) 240 mg 24 hr tablet Take 1 tablet by mouth once daily 90 tablet 3    doxycycline (VIBRAMYCIN) 100 MG Cap Take 100 mg by mouth 2 (two) times daily. (Patient not taking: Reported on 4/1/2025)      empagliflozin (JARDIANCE) 10 mg tablet Take 1 tablet by mouth once daily. (Patient not taking: Reported on 4/1/2025)      ergocalciferol (ERGOCALCIFEROL) 50,000 unit Cap Take 1 capsule by mouth every 7 days. (Patient not taking: Reported on 4/1/2025)      estradioL (ESTRACE) 0.01 % (0.1 mg/gram) vaginal cream Place 1 g vaginally 3 (three) times a week. Place by fingertip application before bedtime three times a week (Monday, Wednesday, Friday) 45 g 3    flecainide (TAMBOCOR) 100 MG Tab TAKE 1 TABLET BY MOUTH EVERY 12 HOURS 180 tablet 3    fluticasone propionate (FLONASE) 50 mcg/actuation nasal spray 2 sprays (100 mcg total) by Each Nostril route 2 (two) times daily.  (Patient not taking: Reported on 2025) 18.2 mL 3    hydroCHLOROthiazide (HYDRODIURIL) 25 MG tablet Take 1 tablet (25 mg total) by mouth once daily. 90 tablet 3    levocetirizine (XYZAL) 5 MG tablet Take 1 tablet by mouth every evening. (Patient not taking: Reported on 2025)      loratadine (CLARITIN) 10 mg tablet Take 10 mg by mouth once daily.      rosuvastatin (CRESTOR) 40 MG Tab TAKE 1 TABLET BY MOUTH ONCE DAILY IN THE EVENING 90 tablet 3    SUTAB 1.479-0.188- 0.225 gram tablet SMARTSI Tablet(s) By Mouth As Directed      XARELTO 20 mg Tab Take 1 tablet by mouth once daily 30 tablet 11     No current facility-administered medications on file prior to visit.

## 2025-05-06 ENCOUNTER — CLINICAL SUPPORT (OUTPATIENT)
Dept: OPHTHALMOLOGY | Facility: CLINIC | Age: 80
End: 2025-05-06
Payer: MEDICARE

## 2025-05-06 ENCOUNTER — PROCEDURE VISIT (OUTPATIENT)
Dept: OPHTHALMOLOGY | Facility: CLINIC | Age: 80
End: 2025-05-06
Payer: MEDICARE

## 2025-05-06 DIAGNOSIS — H35.3211 EXUDATIVE AGE-RELATED MACULAR DEGENERATION, RIGHT EYE, WITH ACTIVE CHOROIDAL NEOVASCULARIZATION: ICD-10-CM

## 2025-05-06 DIAGNOSIS — H35.3211 EXUDATIVE AGE-RELATED MACULAR DEGENERATION, RIGHT EYE, WITH ACTIVE CHOROIDAL NEOVASCULARIZATION: Primary | ICD-10-CM

## 2025-05-06 PROCEDURE — 92134 CPTRZ OPH DX IMG PST SGM RTA: CPT | Mod: S$GLB,,, | Performed by: OPHTHALMOLOGY

## 2025-05-06 PROCEDURE — 67028 INJECTION EYE DRUG: CPT | Mod: RT,S$GLB,, | Performed by: OPHTHALMOLOGY

## 2025-05-06 PROCEDURE — 92012 INTRM OPH EXAM EST PATIENT: CPT | Mod: 25,S$GLB,, | Performed by: OPHTHALMOLOGY

## 2025-05-06 RX ORDER — ASPIRIN 81 MG/1
TABLET ORAL
COMMUNITY

## 2025-05-06 RX ORDER — BUTALBITAL, ASPIRIN, CAFFEINE AND CODEINE PHOSPHATE 50; 325; 40; 30 MG/1; MG/1; MG/1; MG/1
CAPSULE ORAL
COMMUNITY

## 2025-05-06 RX ORDER — BISOPROLOL FUMARATE AND HYDROCHLOROTHIAZIDE 5; 6.25 MG/1; MG/1
TABLET ORAL
COMMUNITY

## 2025-05-06 RX ADMIN — Medication 1.5 MG: at 11:05

## 2025-05-06 NOTE — PROGRESS NOTES
HPI     Follow-up     Additional comments: 1 mo Avastin OD           Comments    VA improved OD, no pain ou and denies floaters or flashes.          Last edited by Yesenia Latham on 5/6/2025 10:18 AM.          OCT - OD drusen, GA, and paracentral CNVM-Improving  OS drusen and GA    FAF - GA noted OU      A/P    Wet AMD OD      S/p-OD x 1    Avastin OD today-    Start extension    2. 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 PCIOL OU  S/p YAG OU will Racheal    4. PVD OD  VMA OS    5. HTN Ret OU  BP control    6. AFib  On Xarelto        7 weeks  OCT no dilate and Avastin OD      Risks, benefits, and alternatives to treatment discussed in detail with the patient.  The patient voiced understanding and wished to proceed with the procedure    Injection Procedure Note:  Diagnosis: Wet AMD OD    Patient Identified and Time Out complete  Pt marked  Topical Proparacaine and Betadine.  Inject Avastin OD at 6:00 @ 3.5-4mm posterior to limbus  Post Operative Dx: Same  Complications: None  Follow up as above.

## 2025-05-23 ENCOUNTER — TELEPHONE (OUTPATIENT)
Dept: CARDIOLOGY | Facility: CLINIC | Age: 80
End: 2025-05-23
Payer: MEDICARE

## 2025-05-26 ENCOUNTER — OFFICE VISIT (OUTPATIENT)
Dept: CARDIOLOGY | Facility: CLINIC | Age: 80
End: 2025-05-26
Payer: MEDICARE

## 2025-05-26 VITALS
WEIGHT: 159.38 LBS | OXYGEN SATURATION: 96 % | HEIGHT: 65 IN | HEART RATE: 63 BPM | SYSTOLIC BLOOD PRESSURE: 127 MMHG | DIASTOLIC BLOOD PRESSURE: 75 MMHG | BODY MASS INDEX: 26.55 KG/M2

## 2025-05-26 DIAGNOSIS — E78.49 OTHER HYPERLIPIDEMIA: ICD-10-CM

## 2025-05-26 DIAGNOSIS — I70.0 AORTIC ATHEROSCLEROSIS: ICD-10-CM

## 2025-05-26 DIAGNOSIS — I48.0 PAROXYSMAL ATRIAL FIBRILLATION: ICD-10-CM

## 2025-05-26 DIAGNOSIS — I10 ESSENTIAL HYPERTENSION: Primary | ICD-10-CM

## 2025-05-26 PROCEDURE — 3288F FALL RISK ASSESSMENT DOCD: CPT | Mod: CPTII,S$GLB,, | Performed by: INTERNAL MEDICINE

## 2025-05-26 PROCEDURE — 1159F MED LIST DOCD IN RCRD: CPT | Mod: CPTII,S$GLB,, | Performed by: INTERNAL MEDICINE

## 2025-05-26 PROCEDURE — 3074F SYST BP LT 130 MM HG: CPT | Mod: CPTII,S$GLB,, | Performed by: INTERNAL MEDICINE

## 2025-05-26 PROCEDURE — 3078F DIAST BP <80 MM HG: CPT | Mod: CPTII,S$GLB,, | Performed by: INTERNAL MEDICINE

## 2025-05-26 PROCEDURE — 1100F PTFALLS ASSESS-DOCD GE2>/YR: CPT | Mod: CPTII,S$GLB,, | Performed by: INTERNAL MEDICINE

## 2025-05-26 PROCEDURE — 99999 PR PBB SHADOW E&M-EST. PATIENT-LVL III: CPT | Mod: PBBFAC,GC,, | Performed by: STUDENT IN AN ORGANIZED HEALTH CARE EDUCATION/TRAINING PROGRAM

## 2025-05-26 PROCEDURE — 99214 OFFICE O/P EST MOD 30 MIN: CPT | Mod: S$GLB,,, | Performed by: INTERNAL MEDICINE

## 2025-05-26 NOTE — PROGRESS NOTES
I have reviewed the notes, assessments, and/or procedures performed this visit, and I concur with the documentation.    Doing well

## 2025-05-26 NOTE — PROGRESS NOTES
"    PCP - Tu Marquis MD  Referring Physician:     Subjective:   Patient ID:  Rachel Silver is a 79 y.o. y.o. female who presents for evaluation and treatment of hypertension, hyperlipidemia and pAF      Ms. Rachel Silver is a 76 y.o. woman with pAF (on flecainide and xarelto), HTN, HLD. Patient of Dr Graff. Presents today for one year follow up. She is doing well. Not exercising but active at home and with grandchildren She is compliant with all medications. Blood pressure  and LDL under control. Patient on xarelto and flecainide, used to follow up with Chris needs to establish care with new EP doctor       History:     Social History     Tobacco Use    Smoking status: Former     Current packs/day: 0.00     Types: Cigarettes     Quit date: 10/9/1987     Years since quittin.6    Smokeless tobacco: Never   Substance Use Topics    Alcohol use: Yes     Comment: social     Family History   Problem Relation Name Age of Onset    Heart failure Mother      Stroke Father      Macular degeneration Sister      Macular degeneration Sister      Stroke Brother      Macular degeneration Brother      Heart disease Maternal Aunt      Heart disease Maternal Aunt      Heart disease Maternal Aunt      Heart disease Maternal Uncle      Heart disease Maternal Uncle         Meds:   Review of patient's allergies indicates:  No Known Allergies  Current Medications[1]    ROS    Objective:   /75 (Patient Position: Sitting)   Pulse 63   Ht 5' 5" (1.651 m)   Wt 72.3 kg (159 lb 6.3 oz)   SpO2 96%   BMI 26.52 kg/m²   Physical Exam  Constitutional:       General: She is not in acute distress.     Appearance: Normal appearance. She is not ill-appearing.   Neck:      Vascular: No JVD.   Cardiovascular:      Rate and Rhythm: Normal rate and regular rhythm.      Heart sounds: No murmur heard.  Pulmonary:      Effort: Pulmonary effort is normal. No respiratory distress.      Breath sounds: Normal breath sounds. " "  Abdominal:      General: Abdomen is flat.      Palpations: Abdomen is soft.   Musculoskeletal:      Right lower leg: No edema.      Left lower leg: No edema.   Neurological:      General: No focal deficit present.      Mental Status: She is alert and oriented to person, place, and time.         Labs:     Lab Results   Component Value Date     05/01/2025     11/18/2023    K 3.8 05/01/2025    K 3.7 11/18/2023     11/18/2023    CO2 32 05/01/2025    CO2 30 (H) 11/18/2023    BUN 16.0 05/01/2025    BUN 17 11/18/2023    CREATININE 0.82 05/01/2025    CREATININE 0.9 11/18/2023    GLUCOSE 96 05/01/2025    ANIONGAP 6 (L) 05/01/2025    ANIONGAP 6 (L) 11/18/2023     Lab Results   Component Value Date    HGBA1C 5.7 (H) 05/01/2025     No results found for: "BNP", "BNPTRIAGEBLO"    Lab Results   Component Value Date    WBC 7.73 11/18/2023    HGB 12.5 11/18/2023    HCT 34.9 (L) 11/18/2023     11/18/2023    GRAN 4.6 11/18/2023    GRAN 59.1 11/18/2023     Lab Results   Component Value Date    CHOL 121 10/30/2024    CHOL 136 09/06/2019    HDL 49 10/30/2024    HDL 60 09/06/2019    LDLCALC 58 10/30/2024    LDLCALC 66.2 09/06/2019    TRIG 70 10/30/2024    TRIG 49 09/06/2019       Lab Results   Component Value Date     05/01/2025     11/18/2023    K 3.8 05/01/2025    K 3.7 11/18/2023     11/18/2023    CO2 32 05/01/2025    CO2 30 (H) 11/18/2023    BUN 16.0 05/01/2025    BUN 17 11/18/2023    CREATININE 0.82 05/01/2025    CREATININE 0.9 11/18/2023    GLUCOSE 96 05/01/2025    ANIONGAP 6 (L) 05/01/2025    ANIONGAP 6 (L) 11/18/2023     Lab Results   Component Value Date    HGBA1C 5.7 (H) 05/01/2025     No results found for: "BNP", "BNPTRIAGEBLO" Lab Results   Component Value Date    WBC 7.73 11/18/2023    HGB 12.5 11/18/2023    HCT 34.9 (L) 11/18/2023     11/18/2023    GRAN 4.6 11/18/2023    GRAN 59.1 11/18/2023     Lab Results   Component Value Date    CHOL 121 10/30/2024    CHOL 136 " 09/06/2019    HDL 49 10/30/2024    HDL 60 09/06/2019    LDLCALC 58 10/30/2024    LDLCALC 66.2 09/06/2019    TRIG 70 10/30/2024    TRIG 49 09/06/2019                Cardiovascular Imaging:     Echo 7/3/23    The left ventricle is normal in size with normal systolic function.  The estimated ejection fraction is 63%.  Indeterminate left ventricular diastolic function.  Normal right ventricular size with normal right ventricular systolic function.  Moderate left atrial enlargement.  Mild right atrial enlargement.  Mild tricuspid regurgitation.  Normal central venous pressure (3 mmHg).  The estimated PA systolic pressure is 28 mmHg.    Assessment & Plan:     1. Essential hypertension   BP at goal  Continue current regimen    2. Paroxysmal atrial fibrillation   Patient follows up with Dr. Perez needs to establish with new EP   Continue flecainide and xarelto    3. Aortic atherosclerosis   Continue ASA   4. Other hyperlipidemia   LDL at goal  Continue rosuvastatin            Signed:  Ping HASSAN M.D.  Page # [phone]  Cardiovascular Fellow  [facility]           [1]   Current Outpatient Medications:     aspirin (ECOTRIN) 81 MG EC tablet, 0 tablet, Disp: , Rfl:     diltiaZEM HCl (CARDIZEM LA) 240 mg 24 hr tablet, Take 1 tablet by mouth once daily, Disp: 90 tablet, Rfl: 3    flecainide (TAMBOCOR) 100 MG Tab, TAKE 1 TABLET BY MOUTH EVERY 12 HOURS, Disp: 180 tablet, Rfl: 3    hydroCHLOROthiazide (HYDRODIURIL) 25 MG tablet, Take 1 tablet (25 mg total) by mouth once daily., Disp: 90 tablet, Rfl: 3    loratadine (CLARITIN) 10 mg tablet, Take 10 mg by mouth once daily., Disp: , Rfl:     rosuvastatin (CRESTOR) 40 MG Tab, TAKE 1 TABLET BY MOUTH ONCE DAILY IN THE EVENING, Disp: 90 tablet, Rfl: 3    XARELTO 20 mg Tab, Take 1 tablet by mouth once daily, Disp: 30 tablet, Rfl: 11    azelastine (ASTELIN) 137 mcg (0.1 %) nasal spray, 1 spray (137 mcg total) by Nasal route 2 (two) times daily. (Patient not taking:  Reported on 2025), Disp: 30 mL, Rfl: 3    bisoprolol-hydrochlorothiazide 5-6.25 mg (ZIAC) 5-6.25 mg Tab, 30 (Patient not taking: Reported on 2025), Disp: , Rfl:     codeine-butalbital-ASA-caffeine (ASCOMP WITH CODEINE) 27--40 mg Cap, 28 (Patient not taking: Reported on 2025), Disp: , Rfl:     doxycycline (VIBRAMYCIN) 100 MG Cap, Take 100 mg by mouth 2 (two) times daily. (Patient not taking: Reported on 2025), Disp: , Rfl:     empagliflozin (JARDIANCE) 10 mg tablet, Take 1 tablet by mouth once daily. (Patient not taking: Reported on 2025), Disp: , Rfl:     ergocalciferol (ERGOCALCIFEROL) 50,000 unit Cap, Take 1 capsule by mouth every 7 days. (Patient not taking: Reported on 2025), Disp: , Rfl:     estradioL (ESTRACE) 0.01 % (0.1 mg/gram) vaginal cream, Place 1 g vaginally 3 (three) times a week. Place by fingertip application before bedtime three times a week (Monday, Wednesday, Friday), Disp: 45 g, Rfl: 3    fluticasone propionate (FLONASE) 50 mcg/actuation nasal spray, 2 sprays (100 mcg total) by Each Nostril route 2 (two) times daily. (Patient not taking: Reported on 2025), Disp: 18.2 mL, Rfl: 3    levocetirizine (XYZAL) 5 MG tablet, Take 1 tablet by mouth every evening. (Patient not taking: Reported on 2025), Disp: , Rfl:     SUTAB 1.479-0.188- 0.225 gram tablet, SMARTSI Tablet(s) By Mouth As Directed (Patient not taking: Reported on 2025), Disp: , Rfl:

## 2025-07-01 ENCOUNTER — PROCEDURE VISIT (OUTPATIENT)
Dept: OPHTHALMOLOGY | Facility: CLINIC | Age: 80
End: 2025-07-01
Payer: MEDICARE

## 2025-07-01 ENCOUNTER — CLINICAL SUPPORT (OUTPATIENT)
Dept: OPHTHALMOLOGY | Facility: CLINIC | Age: 80
End: 2025-07-01
Payer: MEDICARE

## 2025-07-01 DIAGNOSIS — H35.3211 EXUDATIVE AGE-RELATED MACULAR DEGENERATION, RIGHT EYE, WITH ACTIVE CHOROIDAL NEOVASCULARIZATION: Primary | ICD-10-CM

## 2025-07-01 PROCEDURE — 92012 INTRM OPH EXAM EST PATIENT: CPT | Mod: 25,S$GLB,, | Performed by: OPHTHALMOLOGY

## 2025-07-01 PROCEDURE — 67028 INJECTION EYE DRUG: CPT | Mod: RT,S$GLB,, | Performed by: OPHTHALMOLOGY

## 2025-07-01 PROCEDURE — 92134 CPTRZ OPH DX IMG PST SGM RTA: CPT | Mod: S$GLB,,, | Performed by: OPHTHALMOLOGY

## 2025-07-01 RX ADMIN — Medication 1.25 MG: at 02:07

## 2025-07-01 NOTE — PROGRESS NOTES
HPI     Macular Degeneration     Additional comments: Armd   Avastin  injection  OD     Pciol ou   Pt c/o va  may be stable  today   No flashes   no floaters     Dls 05/06/25          Last edited by Kandace Parker on 7/1/2025  1:17 PM.          OCT - OD drusen, GA, and paracentral CNVM-Resolved  OS drusen and GA    FAF - GA noted OU      A/P    Wet AMD OD      S/p-OD x 2    Avastin OD today-    Continue  extension    2. 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 PCIOL OU  S/p YAG OU will Racheal    4. PVD OD  VMA OS    5. HTN Ret OU  BP control    6. AFib  On Xarelto        9 -10 weeks  OCT and dilate and Avastin OD      Risks, benefits, and alternatives to treatment discussed in detail with the patient.  The patient voiced understanding and wished to proceed with the procedure    Injection Procedure Note:  Diagnosis: Wet AMD OD    Patient Identified and Time Out complete  Pt marked  Topical Proparacaine and Betadine.  Inject Avastin OD at 6:00 @ 3.5-4mm posterior to limbus  Post Operative Dx: Same  Complications: None  Follow up as above.

## 2025-07-07 NOTE — TELEPHONE ENCOUNTER
Refill Routing Note   Medication(s) are not appropriate for processing by [facility] for the following reason(s):        Required labs outdated    [facility] action(s):  Defer               Appointments  past 12m or future 3m with PCP    Date Provider   Last Visit   6/16/2023 Neena Graff MD   Next Visit   Visit date not found Neena Graff MD   ED visits in past 90 days: 0        Note composed:12:09 PM 07/07/2025

## 2025-07-21 ENCOUNTER — TELEPHONE (OUTPATIENT)
Dept: OTOLARYNGOLOGY | Facility: CLINIC | Age: 80
End: 2025-07-21
Payer: MEDICARE

## 2025-07-21 NOTE — TELEPHONE ENCOUNTER
PATIENT WITH HX OF VOCAL CORD CANCER, BEEN HAVING THROAT PAIN OFF AND ON FOR 2 WEEKS NOW. WOULD LIKE 6 MONTH FOLLOW UP APPT MOVED UP SOONER. SCHEDULED PT TO 7/28/2025

## 2025-07-21 NOTE — TELEPHONE ENCOUNTER
Copied from CRM #[id number]. Topic: Appointments - Appointment Access  >> Jul 21, 2025 12:58 PM Ashok wrote:  Type:  Sooner Apoointment Request    Caller is requesting a sooner appointment.  Caller declined first available appointment listed below.  Caller will not accept being placed on the waitlist and is requesting a message be sent to doctor.    Name of Caller:Rachel     When is the first available appointment?10/08/2025    Symptoms:10wk Avastin OCT?DFE. Patient would like to be seen sooner. Please reach out to the patient for scheduling.     Would the patient rather a call back or a response via [facility]? Callback     Best Call Back Number:.[phone]    Additional Information:

## 2025-07-28 ENCOUNTER — OFFICE VISIT (OUTPATIENT)
Dept: OTOLARYNGOLOGY | Facility: CLINIC | Age: 80
End: 2025-07-28
Payer: MEDICARE

## 2025-07-28 VITALS
SYSTOLIC BLOOD PRESSURE: 146 MMHG | HEIGHT: 65 IN | BODY MASS INDEX: 26.54 KG/M2 | DIASTOLIC BLOOD PRESSURE: 76 MMHG | WEIGHT: 159.31 LBS

## 2025-07-28 DIAGNOSIS — H90.3 SENSORINEURAL HEARING LOSS (SNHL) OF BOTH EARS: ICD-10-CM

## 2025-07-28 DIAGNOSIS — R07.0 THROAT PAIN IN ADULT: ICD-10-CM

## 2025-07-28 DIAGNOSIS — R09.89 THROAT CLEARING: ICD-10-CM

## 2025-07-28 DIAGNOSIS — D02.0 SQUAMOUS CELL CARCINOMA IN SITU (SCCIS) OF TRUE VOCAL CORD: Primary | ICD-10-CM

## 2025-07-28 DIAGNOSIS — K21.9 LARYNGOPHARYNGEAL REFLUX (LPR): ICD-10-CM

## 2025-07-28 DIAGNOSIS — R49.0 DYSPHONIA: ICD-10-CM

## 2025-07-28 DIAGNOSIS — J34.3 HYPERTROPHY OF INFERIOR NASAL TURBINATE: ICD-10-CM

## 2025-07-28 PROCEDURE — 31575 DIAGNOSTIC LARYNGOSCOPY: CPT | Mod: S$GLB,,, | Performed by: OTOLARYNGOLOGY

## 2025-07-28 PROCEDURE — 3288F FALL RISK ASSESSMENT DOCD: CPT | Mod: CPTII,S$GLB,, | Performed by: OTOLARYNGOLOGY

## 2025-07-28 PROCEDURE — 3077F SYST BP >= 140 MM HG: CPT | Mod: CPTII,S$GLB,, | Performed by: OTOLARYNGOLOGY

## 2025-07-28 PROCEDURE — 1101F PT FALLS ASSESS-DOCD LE1/YR: CPT | Mod: CPTII,S$GLB,, | Performed by: OTOLARYNGOLOGY

## 2025-07-28 PROCEDURE — 99214 OFFICE O/P EST MOD 30 MIN: CPT | Mod: 25,S$GLB,, | Performed by: OTOLARYNGOLOGY

## 2025-07-28 PROCEDURE — 1126F AMNT PAIN NOTED NONE PRSNT: CPT | Mod: CPTII,S$GLB,, | Performed by: OTOLARYNGOLOGY

## 2025-07-28 PROCEDURE — 3078F DIAST BP <80 MM HG: CPT | Mod: CPTII,S$GLB,, | Performed by: OTOLARYNGOLOGY

## 2025-07-28 PROCEDURE — 1159F MED LIST DOCD IN RCRD: CPT | Mod: CPTII,S$GLB,, | Performed by: OTOLARYNGOLOGY

## 2025-07-28 RX ORDER — OMEPRAZOLE 40 MG/1
40 CAPSULE, DELAYED RELEASE ORAL EVERY MORNING
Qty: 30 CAPSULE | Refills: 2 | Status: SHIPPED | OUTPATIENT
Start: 2025-07-28 | End: 2025-10-26

## 2025-07-28 NOTE — PROGRESS NOTES
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       History of Present Illness  Rachel Sivler is a 79 y.o. female  presenting today for a followup.  S/p left vocal fold biopsy with dr lawrence in 2017 and path" Marked squamous dysplasia, with no definitive evidence of invasive malignancy" . I performed dl biopsy of left vocal fold in 2020 and found to have scca in situ and subsequently performed co2 laser of left vocal fold     Had gotten some ozempic and reflux had been worse and had throat clearing more as well. She has been traveling a lot as well. Voice seems the same to her. Has been having postnasal drip as well . Has been sitting on porch more and has a lot of allergen exposure    Dull pain that sometimes changes locations. Does not bother her at night . Does not her to swallow. Intermittent. Sometimes feels like in back of nose but more often on right side of neck. It comes and goes and sometimes not present at all that day. Hurts after clear. Pain started around 2 weeks ago. Sometimes itches in the nasopharynx     I last saw the patient on 4-7-25. Below text is copied from  note on that date describing history of present illness at that time :  Underwent co2 laser excision of scca in situ 7-9-2020   Takes xyzal in evening which has been helping with throat clearing and the postnasal drip  Clearing has gotten much better  She does not like nasal sprays   She had been working outside and a couple of weeks ago had some runny nose and congestion and headache but resolved after a few days  No issues with speech or swallowing   No hemoptysis     Had bleed from right lydia ea couple weeks ago stopped on own but was larger bleed      I last saw the patient on 10-7 - 24. Below text is copied from  " "note on that date describing history of present illness at that time :  Has been clearing throat a lot recently but had some work done on her home and there was a lot of dust  Has postanasal drip   Gets temporary hoarseness- associated when she is clearing more  Did slp in the past for throat clearing      I last saw the patient on 6-28 - 24. Below text is copied from  note on that date describing history of present illness at that time :  Voice sounds better today compared   +throat clearing - has been worse   Had chest ct and that was clear   Has been coughing more and had some white stuff ; chest ct was clear   Has been having some reflux too   Helps when sucks on a peppermint  Voice gets raspy intermittently  brien with increased clearing but sounds better today  No issues with swallowing  No ear pain really- on occasion gets left ear pain but not often very seldom   Not coughing up any blood  Ear pain happens with nasal congestion  Started back on nexium last week has been taking in the am      I last saw the patient on 3-14-24. Below text is copied from  note on that date describing history of present illness at that time :  h/o scca in situ s/p partial cordectomy and was having hoarseness at last visit. Here today for repeat scope  No hemoptysis, no sore throat      Brothers 80 birthday this weekend and was talking a lot so hard for her to say regarding voice. Forgot to take nasal sprays ; voice was improving up until this past weekend and back to how was when she was here but definietly no worsenign     Did slp in the past for throat clearing but forgot exercises   Has been ahving some reflux lately as well has nexium otc      Still with postnasal drip and throat clearing still   Is getting chest xray upcoming as well by pcp as feels some chest congestion   Has gaviscon      I last saw the patient on 2-1-24. Started on saline, flonase and astelin that visit. Plan from last visit copied in quotations "Vocal " "fold that had scca in situ s/p excision - no recurrent disease noted, has  slight scar but looks stable compared to prior scope exam photos  Hoarse- recent onset but I want to do close folow up and may need dl which I discsused with her. Because it just started and she is also having allergy flare up/sinus drip and scope stable today will monitor but stressed importance about close follow up as could be something small and subtle and want to ensure scope continues to look stable and voice improves because if not I will take her to there operating room to evaluate." Below text is copied from  note on that date describing history of present illness at that time :  Has been getting hoarse off and on but has been having a lot of throat clearing too. Has only been for the past week or two and not consistent. Is getting chest xray   No throat pain   No issues with swallowing  Sometimes throat feels dry but does not drink a lot of water  No hemoptysis  No heartburn  +congestion and postnasal drip takes zyrtec has problems doing nasal sprays  When allergies flaring up gets left earache but omes and geos and is mild     I last saw the patient on 3-14-24. Below text is copied from  note on that date describing history of present illness at that time :Sore throat that swaps sides has been off and on for about 6 weeks . Last seen 9-21-22.   Ear ache mostly on right side , feels it more when sinuses are really bad. She can't take flonase   Throat clearing a lot  Takes nexium now; has been having worse reflux. Nexium gives her diarrhea   No pain with swallowing  Voice is same. Has raspy voice but unchanged     She underwent DL biopsy which was concerning for SCCa in situ, could not rule out invasive scca (6-11-20) who underwent microlaryngeal excision of vocal cord lesion with CO2 laser on 7-9-20 of her left true vocal fold. CO2 laser also used to vaporize resection bed. Pathology from cord excision showed moderate dysplasia with " some crush artifact and sectioning artifact but did not appear to have any residual scca in situ.        Past Medical History  Past Medical History:   Diagnosis Date    Atrial fibrillation     Cataracts, bilateral     Hoarse     Hypertension     UTI (urinary tract infection)     Vaginal delivery     x3        Past Surgical History  Past Surgical History:   Procedure Laterality Date    CYSTOSCOPY      HYSTERECTOMY      LARYNGOSCOPY N/A 06/11/2020    Procedure: LARYNGOSCOPY;  Surgeon: Yesenia Durham MD;  Location: [facility] OR;  Service: ENT;  Laterality: N/A;  RN PRE OP, COVID NEGATIVE-- 6-8-2020 CA  HAS-Cardiac Clearance today 6-8 [facility] [place]    MICROLARYNGOSCOPY N/A 07/09/2020    Procedure: MICROLARYNGOSCOPY;  Surgeon: Yesenia Durham MD;  Location: [facility] OR;  Service: ENT;  Laterality: N/A;    Microlaryngoscopy with biopsy vocal cords      TONSILLECTOMY      torn meniscus      VOCAL FOLD LESION EXCISION  07/09/2020    Procedure: EXCISION, LESION, VOCAL CORD, USING LASER;  Surgeon: Yesenia Durham MD;  Location: [facility] OR;  Service: ENT;;        Medications  Medications Ordered Prior to Encounter[1]    Review of Systems  Review of Systems   Constitutional:  Negative for fever.   HENT:  Positive for ear pain (on occasion on right side, comes and goes), hearing loss (no changes but gradual and has h/o hearing loss) and sore throat.    Respiratory:  Negative for hemoptysis.    Gastrointestinal:  Positive for heartburn.   Musculoskeletal:  Positive for neck pain.   Endo/Heme/Allergies:  Positive for environmental allergies.        Social History   reports that she quit smoking about 37 years ago. Her smoking use included cigarettes. She has never used smokeless tobacco. She reports current alcohol use. She reports that she does not use drugs.     Family History  Family History   Problem Relation Name Age of Onset    Heart failure Mother      Stroke Father      Macular degeneration Sister      Macular degeneration Sister       Stroke Brother      Macular degeneration Brother      Heart disease Maternal Aunt      Heart disease Maternal Aunt      Heart disease Maternal Aunt      Heart disease Maternal Uncle      Heart disease Maternal Uncle          Physical Exam   Vitals:    07/28/25 0839   BP: (!) 146/76    Body mass index is 26.51 kg/m².            GENERAL: no acute distress.  HEAD: normocephalic.   EYES: No scleral icterus  EARS: external ear without lesion, normal pinna shape and position.  External auditory canal with normal cerumen, tympanic membrane fully visible, no perforation , no retraction. No middle ear effusion. Ossicles intact.   NOSE: external nose without significant bony abnormality  ORAL CAVITY/OROPHARYNX: tongue mobile. Denture removed - no palate lesion or mass, soft to palpation    NECK: trachea midline.   LYMPH NODES:No cervical lymphadenopathy.  RESPIRATORY: no stridor, no stertor. Voice slight rasp but unchanged Respirations nonlabored.  NEURO: alert, responds to questions appropriately.    PSYCH:mood appropriate    PROCEDURE NOTE  NAME OF PROCEDURE: Flexible Laryngoscopy, diagnostic  INDICATIONS: gag reflex precludes mirror exam, throat pain in adult ; history of scca in situ of larynx  FINDINGS:turbinate hypertrophy.  no mass or lesion , normal motion     Consent: After procedure was explained in detail and all questions answered, verbal consent was obtained for performing flexible laryngoscopy.  Anesthesia: topical 4% lidocaine and neosynephrine  Procedure: With patient in seated position, the scope was inserted into the bilateral nasal passageway and advanced atraumatically into the nasopharynx to examine the following structures:  Nasal cavity: Turbinates with severe hypertrophy. no middle meatal edema. No purulent drainage.   Nasopharynx: no mass or lesion noted in nasopharynx.   Oropharynx: base of tongue without  mass or ulceration. Lingual tonsils normal in appearance  Hypopharynx: posterior pharyngeal  wall without mass or lesion. No pooling of secretions. Pyriform sinuses visible without mass or lesion  Larynx: epiglottis normal without lesion. False vocal folds without edema/erythema/lesion. True vocal folds mobile and without lesion. Mild interarytenoid edema no erythema . Postcricoid region with mild edema no lesion   Subglottis: visualized portion of subglottis normal in appearance    After examination performed, the scope was removed atraumatically . The patient tolerated the procedure well. Photodocumentation obtained with representative images below, all images and/or videos uploaded in media section of epic.                          Imaging:  The patient does not have any new imaging of the head and neck since last visit.     Labs:  CBC  Recent Labs   Lab 11/17/23 1921 11/18/23  0647   WBC 8.31 7.73   Hemoglobin 14.7 12.5   Hematocrit 41.3 34.9 L   MCV 86 85   Platelets 267 238     BMP  Recent Labs   Lab 02/10/23  0919 11/17/23 1921 11/18/23  0647 04/26/24  1102 10/30/24  1124 05/01/25  1014   Glucose 100 89 112 H  --   --   --    Sodium 139 145 143 143 141 141   Potassium 3.5 3.5 3.7 3.6 3.6 3.8   Chloride 103 105 107  --   --   --    CO2 26 28 30 H  --   --   --    Carbon Dioxide  --   --   --  31 32 32   BUN 13 16 17 12.1 24.6 16.0   Creatinine 0.8 0.9 0.9 0.87 0.91 0.82   Calcium 9.5 10.3 8.9 9.6 9.4 9.3   Phosphorus  --   --  3.5  --   --   --    Magnesium  --  2.1 2.3  --   --   --      COAGS        Assessment  1. 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 Dysphonia    3. Throat pain in adult    4. Throat clearing    5. Laryngopharyngeal reflux (LPR)    6. Hypertrophy of inferior nasal turbinate       Plan:  Discussed plan of care with patient in detail and all questions answered. Patient reported understanding of plan of care. I gave the patient the opportunity to ask questions and patient confirmed all questions answered to satisfaction.     F/u October for closer exam to  ensure nothing comes up . Will notify me of any worsening. Aware of si/sx of cancer to look out for.   Hearing test in October -last in 2023  Does not like nasal sprays - try ayr   Try flonase at least once a day   Trial of ppi  Message me if does not have the slp exercises  Can do year if still ok at follow up   F/u October with hearing test            [1]   Current Outpatient Medications on File Prior to Visit   Medication Sig Dispense Refill    aspirin (ECOTRIN) 81 MG EC tablet 0 tablet      azelastine (ASTELIN) 137 mcg (0.1 %) nasal spray 1 spray (137 mcg total) by Nasal route 2 (two) times daily. 30 mL 3    codeine-butalbital-ASA-caffeine (ASCOMP WITH CODEINE) 70--40 mg Cap       diltiaZEM HCl (CARDIZEM LA) 240 mg 24 hr tablet Take 1 tablet by mouth once daily 90 tablet 3    empagliflozin (JARDIANCE) 10 mg tablet Take 1 tablet by mouth once daily.      ergocalciferol (ERGOCALCIFEROL) 50,000 unit Cap Take 1 capsule by mouth every 7 days.      flecainide (TAMBOCOR) 100 MG Tab TAKE 1 TABLET BY MOUTH EVERY 12 HOURS 180 tablet 3    fluticasone propionate (FLONASE) 50 mcg/actuation nasal spray 2 sprays (100 mcg total) by Each Nostril route 2 (two) times daily. 18.2 mL 3    hydroCHLOROthiazide (HYDRODIURIL) 25 MG tablet Take 1 tablet (25 mg total) by mouth once daily. 90 tablet 3    levocetirizine (XYZAL) 5 MG tablet Take 1 tablet by mouth every evening.      loratadine (CLARITIN) 10 mg tablet Take 10 mg by mouth once daily.      rivaroxaban (XARELTO) 20 mg Tab Take 1 tablet (20 mg total) by mouth once daily. 90 tablet 0    rosuvastatin (CRESTOR) 40 MG Tab TAKE 1 TABLET BY MOUTH ONCE DAILY IN THE EVENING 90 tablet 3     No current facility-administered medications on file prior to visit.

## 2025-07-28 NOTE — PATIENT INSTRUCTIONS
Clearing your throat leads to more swelling in the voice box.  This will worsen your symptoms.  You should try to minimize throat clearing as much as possible.Get a cup of water and a straw and when you feel like you want to clear your throat, blow bubble through the straw into the water     Can try gaviscon liquid over the counter - take 15 minutes after a meal as needed for throat phlegm    Avoid mouthwash with alcohol such as listerine. You should use biotene mouth wash    Can sleep with bedside humidifier     You should aim to drink 2 to 3 liters of water daily if you are drinking one cup of coffee.  If you have trouble drinking water, you can cut back or cut out caffeine. If you have trouble drinking water, you can cut back or cut out caffeine.    Lozenges:  Halls Breezers - sold with cough drops, Can try sugar free lozenges with pectin ,  such as halls fruit breezers      Xylimelts, on toothpaste aisle, these are convenient for when you are talking and or sleeping - they stick to gumline and provide moisture - IPDIA or Wink        Steam and gargle - 3x day  You may consider getting a facial steamer, breathe in warm moist air  through mouth and nose to hydrate vocal folds. On amazon, I like the [employer] version that is under $25.        Gargle:   1/2 tsp each salt, baking soda, clear corn syrup, 6 oz warm water  Sip, gargle quietly, spit, repeat until gone, don't eat/drink for 30 minutes after.      Chew gum with baking soda after meals, Orbit White     Order online, when it's time to get more Gaviscon, either Alginate therapy such as Reflux Gourmet  or Gaviscon Advance can be used following meals and at bedtime for reflux coverage. The Acid Watcher Diet by Dr Null as well as the Chronic Cough [place] by Dr Cohen are good reads to gain improved understanding of dietary and behavioral changes that can aid in reduction of LPRD, and to better understand cough and cough control     [email or web address]

## 2025-08-21 ENCOUNTER — TELEPHONE (OUTPATIENT)
Dept: RESEARCH | Facility: HOSPITAL | Age: 80
End: 2025-08-21
Payer: MEDICARE

## 2025-08-25 ENCOUNTER — RESEARCH ENCOUNTER (OUTPATIENT)
Dept: RESEARCH | Facility: HOSPITAL | Age: 80
End: 2025-08-25
Payer: MEDICARE

## (undated) DEVICE — SUPPORT ULNA NERVE PROTECTOR

## (undated) DEVICE — CANISTER SUCTION 2 LTR

## (undated) DEVICE — SHEET DRAPE FAN-FOLDED 3/4

## (undated) DEVICE — SYR 10CC LUER LOCK

## (undated) DEVICE — NDL 18GA X1 1/2 REG BEVEL

## (undated) DEVICE — POSITIONER HEAD DONUT 9IN FOAM

## (undated) DEVICE — CONTAINER SPECIMEN STRL 4OZ

## (undated) DEVICE — SPONGE PATTY SURGICAL .5X3IN

## (undated) DEVICE — DRESSING TELFA N ADH 3X8

## (undated) DEVICE — SOL 9P NACL IRR PIC IL

## (undated) DEVICE — SEE MEDLINE ITEM 146292

## (undated) DEVICE — KIT ANTIFOG

## (undated) DEVICE — DRESSING SURGICAL 1/2X1/2

## (undated) DEVICE — SEE MEDLINE ITEM 107746

## (undated) DEVICE — SPONGE GAUZE 16PLY 4X4

## (undated) DEVICE — SYR ONLY LUER LOCK 20CC

## (undated) DEVICE — SEE MEDLINE ITEM 157117

## (undated) DEVICE — SEE L#120831

## (undated) DEVICE — SEE MEDLINE ITEM 152487

## (undated) DEVICE — SEE MEDLINE ITEM 152622

## (undated) DEVICE — NDL FILTER MICRON 18G 1 1/2

## (undated) DEVICE — SEE MEDLINE ITEM 146313

## (undated) DEVICE — BLANKET LOWER BODY 55.9X40.2IN

## (undated) DEVICE — ELECTRODE REM PLYHSV RETURN 9

## (undated) DEVICE — SYS LABLNG CORECT MED 4 FLG

## (undated) DEVICE — SET BLD COL SAFETY 23G 3/4 LL

## (undated) DEVICE — SYR 12CC CNTRL L-L NO NDL